# Patient Record
Sex: FEMALE | Race: BLACK OR AFRICAN AMERICAN | NOT HISPANIC OR LATINO | URBAN - METROPOLITAN AREA
[De-identification: names, ages, dates, MRNs, and addresses within clinical notes are randomized per-mention and may not be internally consistent; named-entity substitution may affect disease eponyms.]

---

## 2017-03-02 ENCOUNTER — INPATIENT (INPATIENT)
Facility: HOSPITAL | Age: 27
LOS: 4 days | Discharge: HOME | End: 2017-03-07
Attending: NEUROLOGICAL SURGERY | Admitting: NEUROLOGICAL SURGERY
Payer: MEDICAID

## 2017-03-02 VITALS
OXYGEN SATURATION: 98 % | RESPIRATION RATE: 18 BRPM | DIASTOLIC BLOOD PRESSURE: 89 MMHG | SYSTOLIC BLOOD PRESSURE: 141 MMHG | HEART RATE: 117 BPM | TEMPERATURE: 98 F

## 2017-03-02 DIAGNOSIS — Z98.890 OTHER SPECIFIED POSTPROCEDURAL STATES: Chronic | ICD-10-CM

## 2017-03-02 DIAGNOSIS — R51 HEADACHE: ICD-10-CM

## 2017-03-02 DIAGNOSIS — T85.09XA OTHER MECHANICAL COMPLICATION OF VENTRICULAR INTRACRANIAL (COMMUNICATING) SHUNT, INITIAL ENCOUNTER: ICD-10-CM

## 2017-03-02 LAB
ALBUMIN SERPL ELPH-MCNC: 4.2 G/DL — SIGNIFICANT CHANGE UP (ref 3.3–5)
ALP SERPL-CCNC: 114 U/L — SIGNIFICANT CHANGE UP (ref 40–120)
ALT FLD-CCNC: 36 U/L — HIGH (ref 4–33)
APPEARANCE UR: SIGNIFICANT CHANGE UP
APPEARANCE UR: SIGNIFICANT CHANGE UP
APTT BLD: 31.3 SEC — SIGNIFICANT CHANGE UP (ref 27.5–37.4)
AST SERPL-CCNC: 18 U/L — SIGNIFICANT CHANGE UP (ref 4–32)
BACTERIA # UR AUTO: SIGNIFICANT CHANGE UP
BASOPHILS # BLD AUTO: 0.01 K/UL — SIGNIFICANT CHANGE UP (ref 0–0.2)
BASOPHILS NFR BLD AUTO: 0.1 % — SIGNIFICANT CHANGE UP (ref 0–2)
BILIRUB SERPL-MCNC: 0.3 MG/DL — SIGNIFICANT CHANGE UP (ref 0.2–1.2)
BILIRUB UR-MCNC: NEGATIVE — SIGNIFICANT CHANGE UP
BILIRUB UR-MCNC: NEGATIVE — SIGNIFICANT CHANGE UP
BLOOD UR QL VISUAL: HIGH
BLOOD UR QL VISUAL: NEGATIVE — SIGNIFICANT CHANGE UP
BUN SERPL-MCNC: 13 MG/DL — SIGNIFICANT CHANGE UP (ref 7–23)
CALCIUM SERPL-MCNC: 9.1 MG/DL — SIGNIFICANT CHANGE UP (ref 8.4–10.5)
CHLORIDE SERPL-SCNC: 103 MMOL/L — SIGNIFICANT CHANGE UP (ref 98–107)
CO2 SERPL-SCNC: 25 MMOL/L — SIGNIFICANT CHANGE UP (ref 22–31)
COLOR SPEC: YELLOW — SIGNIFICANT CHANGE UP
COLOR SPEC: YELLOW — SIGNIFICANT CHANGE UP
CREAT SERPL-MCNC: 0.81 MG/DL — SIGNIFICANT CHANGE UP (ref 0.5–1.3)
EOSINOPHIL # BLD AUTO: 0.05 K/UL — SIGNIFICANT CHANGE UP (ref 0–0.5)
EOSINOPHIL NFR BLD AUTO: 0.6 % — SIGNIFICANT CHANGE UP (ref 0–6)
GLUCOSE SERPL-MCNC: 104 MG/DL — HIGH (ref 70–99)
GLUCOSE UR-MCNC: NEGATIVE — SIGNIFICANT CHANGE UP
GLUCOSE UR-MCNC: NEGATIVE — SIGNIFICANT CHANGE UP
HCG SERPL-ACNC: < 5 MIU/ML — SIGNIFICANT CHANGE UP
HCG UR-SCNC: NEGATIVE — SIGNIFICANT CHANGE UP
HCT VFR BLD CALC: 38.7 % — SIGNIFICANT CHANGE UP (ref 34.5–45)
HGB BLD-MCNC: 12.3 G/DL — SIGNIFICANT CHANGE UP (ref 11.5–15.5)
IMM GRANULOCYTES NFR BLD AUTO: 0.3 % — SIGNIFICANT CHANGE UP (ref 0–1.5)
INR BLD: 0.99 — SIGNIFICANT CHANGE UP (ref 0.87–1.18)
KETONES UR-MCNC: NEGATIVE — SIGNIFICANT CHANGE UP
KETONES UR-MCNC: NEGATIVE — SIGNIFICANT CHANGE UP
LEUKOCYTE ESTERASE UR-ACNC: HIGH
LEUKOCYTE ESTERASE UR-ACNC: HIGH
LYMPHOCYTES # BLD AUTO: 2.72 K/UL — SIGNIFICANT CHANGE UP (ref 1–3.3)
LYMPHOCYTES # BLD AUTO: 30.9 % — SIGNIFICANT CHANGE UP (ref 13–44)
MCHC RBC-ENTMCNC: 23.6 PG — LOW (ref 27–34)
MCHC RBC-ENTMCNC: 31.8 % — LOW (ref 32–36)
MCV RBC AUTO: 74.1 FL — LOW (ref 80–100)
MONOCYTES # BLD AUTO: 0.52 K/UL — SIGNIFICANT CHANGE UP (ref 0–0.9)
MONOCYTES NFR BLD AUTO: 5.9 % — SIGNIFICANT CHANGE UP (ref 2–14)
MUCOUS THREADS # UR AUTO: SIGNIFICANT CHANGE UP
MUCOUS THREADS # UR AUTO: SIGNIFICANT CHANGE UP
NEUTROPHILS # BLD AUTO: 5.47 K/UL — SIGNIFICANT CHANGE UP (ref 1.8–7.4)
NEUTROPHILS NFR BLD AUTO: 62.2 % — SIGNIFICANT CHANGE UP (ref 43–77)
NITRITE UR-MCNC: NEGATIVE — SIGNIFICANT CHANGE UP
NITRITE UR-MCNC: NEGATIVE — SIGNIFICANT CHANGE UP
PH UR: 6.5 — SIGNIFICANT CHANGE UP (ref 4.6–8)
PH UR: 7 — SIGNIFICANT CHANGE UP (ref 4.6–8)
PLATELET # BLD AUTO: 312 K/UL — SIGNIFICANT CHANGE UP (ref 150–400)
PMV BLD: 9.9 FL — SIGNIFICANT CHANGE UP (ref 7–13)
POTASSIUM SERPL-MCNC: 3.8 MMOL/L — SIGNIFICANT CHANGE UP (ref 3.5–5.3)
POTASSIUM SERPL-SCNC: 3.8 MMOL/L — SIGNIFICANT CHANGE UP (ref 3.5–5.3)
PROT SERPL-MCNC: 7.6 G/DL — SIGNIFICANT CHANGE UP (ref 6–8.3)
PROT UR-MCNC: 100 — HIGH
PROT UR-MCNC: 20 — SIGNIFICANT CHANGE UP
PROTHROM AB SERPL-ACNC: 11.3 SEC — SIGNIFICANT CHANGE UP (ref 10–13.1)
RBC # BLD: 5.22 M/UL — HIGH (ref 3.8–5.2)
RBC # FLD: 13.8 % — SIGNIFICANT CHANGE UP (ref 10.3–14.5)
RBC CASTS # UR COMP ASSIST: HIGH (ref 0–?)
RBC CASTS # UR COMP ASSIST: SIGNIFICANT CHANGE UP (ref 0–?)
SODIUM SERPL-SCNC: 142 MMOL/L — SIGNIFICANT CHANGE UP (ref 135–145)
SP GR SPEC: 1.03 — SIGNIFICANT CHANGE UP (ref 1–1.03)
SP GR SPEC: 1.03 — SIGNIFICANT CHANGE UP (ref 1–1.03)
SP GR UR: 1.03 — SIGNIFICANT CHANGE UP (ref 1–1.03)
SQUAMOUS # UR AUTO: SIGNIFICANT CHANGE UP
SQUAMOUS # UR AUTO: SIGNIFICANT CHANGE UP
UROBILINOGEN FLD QL: NORMAL E.U. — SIGNIFICANT CHANGE UP (ref 0.1–0.2)
UROBILINOGEN FLD QL: NORMAL E.U. — SIGNIFICANT CHANGE UP (ref 0.1–0.2)
WBC # BLD: 8.8 K/UL — SIGNIFICANT CHANGE UP (ref 3.8–10.5)
WBC # FLD AUTO: 8.8 K/UL — SIGNIFICANT CHANGE UP (ref 3.8–10.5)
WBC UR QL: HIGH (ref 0–?)
WBC UR QL: SIGNIFICANT CHANGE UP (ref 0–?)

## 2017-03-02 PROCEDURE — 71010: CPT

## 2017-03-02 PROCEDURE — 74000: CPT

## 2017-03-02 PROCEDURE — 49427 INJECTION ABDOMINAL SHUNT: CPT

## 2017-03-02 PROCEDURE — 70360 X-RAY EXAM OF NECK: CPT

## 2017-03-02 PROCEDURE — 70450 CT HEAD/BRAIN W/O DYE: CPT | Mod: 26

## 2017-03-02 PROCEDURE — 75809 NONVASCULAR SHUNT X-RAY: CPT | Mod: 26

## 2017-03-02 RX ORDER — DOCUSATE SODIUM 100 MG
100 CAPSULE ORAL
Qty: 0 | Refills: 0 | Status: DISCONTINUED | OUTPATIENT
Start: 2017-03-02 | End: 2017-03-07

## 2017-03-02 RX ORDER — ACETAMINOPHEN 500 MG
650 TABLET ORAL EVERY 6 HOURS
Qty: 0 | Refills: 0 | Status: DISCONTINUED | OUTPATIENT
Start: 2017-03-02 | End: 2017-03-07

## 2017-03-02 RX ORDER — DEXTROSE MONOHYDRATE, SODIUM CHLORIDE, AND POTASSIUM CHLORIDE 50; .745; 4.5 G/1000ML; G/1000ML; G/1000ML
1000 INJECTION, SOLUTION INTRAVENOUS
Qty: 0 | Refills: 0 | Status: DISCONTINUED | OUTPATIENT
Start: 2017-03-02 | End: 2017-03-03

## 2017-03-02 RX ORDER — NORTRIPTYLINE HYDROCHLORIDE 10 MG/1
20 CAPSULE ORAL AT BEDTIME
Qty: 0 | Refills: 0 | Status: DISCONTINUED | OUTPATIENT
Start: 2017-03-02 | End: 2017-03-07

## 2017-03-02 RX ORDER — METOCLOPRAMIDE HCL 10 MG
10 TABLET ORAL ONCE
Qty: 0 | Refills: 0 | Status: COMPLETED | OUTPATIENT
Start: 2017-03-02 | End: 2017-03-02

## 2017-03-02 RX ORDER — SODIUM CHLORIDE 9 MG/ML
1000 INJECTION INTRAMUSCULAR; INTRAVENOUS; SUBCUTANEOUS ONCE
Qty: 0 | Refills: 0 | Status: COMPLETED | OUTPATIENT
Start: 2017-03-02 | End: 2017-03-02

## 2017-03-02 RX ORDER — FLUTICASONE PROPIONATE 50 MCG
2 SPRAY, SUSPENSION NASAL DAILY
Qty: 0 | Refills: 0 | Status: DISCONTINUED | OUTPATIENT
Start: 2017-03-02 | End: 2017-03-02

## 2017-03-02 RX ORDER — FLUTICASONE PROPIONATE 50 MCG
2 SPRAY, SUSPENSION NASAL DAILY
Qty: 0 | Refills: 0 | Status: DISCONTINUED | OUTPATIENT
Start: 2017-03-02 | End: 2017-03-07

## 2017-03-02 RX ORDER — TRIAMTERENE/HYDROCHLOROTHIAZID 75 MG-50MG
1 TABLET ORAL DAILY
Qty: 0 | Refills: 0 | Status: DISCONTINUED | OUTPATIENT
Start: 2017-03-02 | End: 2017-03-07

## 2017-03-02 RX ADMIN — Medication 10 MILLIGRAM(S): at 19:00

## 2017-03-02 RX ADMIN — SODIUM CHLORIDE 1000 MILLILITER(S): 9 INJECTION INTRAMUSCULAR; INTRAVENOUS; SUBCUTANEOUS at 19:00

## 2017-03-02 RX ADMIN — DEXTROSE MONOHYDRATE, SODIUM CHLORIDE, AND POTASSIUM CHLORIDE 75 MILLILITER(S): 50; .745; 4.5 INJECTION, SOLUTION INTRAVENOUS at 23:45

## 2017-03-02 NOTE — H&P ADULT. - ASSESSMENT
25 yo woman w/ h/o hydrocephalus (s/p  shunt, last revision 1 yr ago) p/w headache. Rule out shunt malfunction

## 2017-03-02 NOTE — ED PROVIDER NOTE - PMH
Meniere disease  Pt  discontinued   triamterene/HCTZ,  stopped March. Pt states MD aware  Obstructive hydrocephalus   shunt placed at birth with multiple revisions for malfunction. LAST REVISION 02/2015  Personal History of Hydrocephalus (ICD9 V12.49)    Strabismus

## 2017-03-02 NOTE — H&P ADULT. - PROBLEM SELECTOR PLAN 1
Rule out shunt malfunction  Ophthomology consult to evaluate for papilledema  NPO after midnight  Preop for possible revision vs placement of ICP monitor in AM

## 2017-03-02 NOTE — ED PROVIDER NOTE - OBJECTIVE STATEMENT
27 yo woman w/ h/o hydrocephalus (s/p  shunt, last revision 1 yr ago) p/w headache. States she always had headache, but has worsened over past few days associated w/ multiple episodes of vomiting, R facial numbness and increased urination. States these are symptoms which have classically been associated w/  shunt malfunction. Denies fever, sob, dysuria, weakness.

## 2017-03-02 NOTE — ED ADULT TRIAGE NOTE - CHIEF COMPLAINT QUOTE
p/t with  shunt c/o of headaches on and off nausea vomiting since yesterday p/t appears uncomfortable

## 2017-03-02 NOTE — ED PROVIDER NOTE - ATTENDING CONTRIBUTION TO CARE
26f, pmhx  shunt, last revision 1 year ago. p/w h/a, vomiting and right sided facial numbness, similar to previous shunt malfunction. per pt mother' even with malfunction CT always shows slit like ventricles even when malfunctioned. exam, vs wnl, nad. gcs 15, CN II-XII normal. motor/sensation normal all 4 limbs. ct head shows noh hydrocephalus. likely still malfunction. seen and admitted to nsx.

## 2017-03-02 NOTE — ED PROVIDER NOTE - PSH
Ventriculo-Peritoneal Shunt Status (ICD9 V45.2)   shunt placed at birth with multiple revisions 1990 2002 x2 2003 2004 2007 2009 2011 2015 .

## 2017-03-02 NOTE — H&P ADULT. - PSH
S/P craniotomy  cranial vault expansion  Ventriculo-Peritoneal Shunt Status (ICD9 V45.2)   shunt placed at birth with multiple revisions 1990 2002 x2 2003 2004 2007 2009 2011 2015 .

## 2017-03-02 NOTE — H&P ADULT. - PMH
Craniosynostosis    Meniere disease  Pt  discontinued   triamterene/HCTZ,  stopped March. Pt states MD aware  Obstructive hydrocephalus   shunt placed at birth with multiple revisions for malfunction. LAST REVISION 02/2015  Personal History of Hydrocephalus (ICD9 V12.49)    Strabismus

## 2017-03-02 NOTE — H&P ADULT. - HISTORY OF PRESENT ILLNESS
25 yo woman w/ h/o hydrocephalus (s/p  shunt, last revision 1 yr ago) p/w headache. States she always had headache, but has worsened over past few days associated w/ multiple episodes of vomiting, R facial numbness and increased urination. States these are symptoms which have classically been associated w/  shunt malfunction. Denies fever, sob, dysuria, weakness.

## 2017-03-02 NOTE — ED PROVIDER NOTE - MEDICAL DECISION MAKING DETAILS
25 yo woman w/ headache, vomiting. Concern for  shunt malfunction. Will check labs, ct, shunt series, nsgy consult.

## 2017-03-02 NOTE — H&P ADULT. - NEUROLOGICAL DETAILS
normal strength/sensation intact/alert and oriented x 3/cranial nerves intact/deep reflexes intact/responds to verbal commands

## 2017-03-02 NOTE — ED ADULT NURSE NOTE - OBJECTIVE STATEMENT
Rec'd 25 YO F in rm 19, c/o HA, N/V, R facial numbness and pain x2 days. Fever 4 days ago. Pt has hx of  shunt since infancy, multiple revisions. Reports short-term memory loss which started 2 years ago. Pt A&OX3, NAD, VSS as noted. Neg facial assymetry, + decreased sensation on R side. No extremity deficits. IV accessed and labs sent. MD angulo ongoing. Will cont to monitor. RF

## 2017-03-03 LAB
BLD GP AB SCN SERPL QL: NEGATIVE — SIGNIFICANT CHANGE UP
RH IG SCN BLD-IMP: POSITIVE — SIGNIFICANT CHANGE UP

## 2017-03-03 PROCEDURE — 70551 MRI BRAIN STEM W/O DYE: CPT | Mod: 26

## 2017-03-03 PROCEDURE — 76705 ECHO EXAM OF ABDOMEN: CPT | Mod: 26

## 2017-03-03 RX ORDER — ONDANSETRON 8 MG/1
8 TABLET, FILM COATED ORAL EVERY 8 HOURS
Qty: 0 | Refills: 0 | Status: DISCONTINUED | OUTPATIENT
Start: 2017-03-03 | End: 2017-03-07

## 2017-03-03 RX ADMIN — DEXTROSE MONOHYDRATE, SODIUM CHLORIDE, AND POTASSIUM CHLORIDE 75 MILLILITER(S): 50; .745; 4.5 INJECTION, SOLUTION INTRAVENOUS at 01:44

## 2017-03-03 RX ADMIN — DEXTROSE MONOHYDRATE, SODIUM CHLORIDE, AND POTASSIUM CHLORIDE 75 MILLILITER(S): 50; .745; 4.5 INJECTION, SOLUTION INTRAVENOUS at 06:50

## 2017-03-03 RX ADMIN — ONDANSETRON 8 MILLIGRAM(S): 8 TABLET, FILM COATED ORAL at 14:01

## 2017-03-03 RX ADMIN — NORTRIPTYLINE HYDROCHLORIDE 20 MILLIGRAM(S): 10 CAPSULE ORAL at 21:31

## 2017-03-03 RX ADMIN — Medication 100 MILLIGRAM(S): at 17:47

## 2017-03-03 RX ADMIN — Medication 100 MILLIGRAM(S): at 06:50

## 2017-03-04 RX ADMIN — ONDANSETRON 8 MILLIGRAM(S): 8 TABLET, FILM COATED ORAL at 12:00

## 2017-03-04 RX ADMIN — Medication 100 MILLIGRAM(S): at 17:02

## 2017-03-04 RX ADMIN — Medication 100 MILLIGRAM(S): at 06:06

## 2017-03-04 RX ADMIN — NORTRIPTYLINE HYDROCHLORIDE 20 MILLIGRAM(S): 10 CAPSULE ORAL at 21:10

## 2017-03-05 RX ORDER — OXYCODONE HYDROCHLORIDE 5 MG/1
10 TABLET ORAL EVERY 4 HOURS
Qty: 0 | Refills: 0 | Status: DISCONTINUED | OUTPATIENT
Start: 2017-03-05 | End: 2017-03-07

## 2017-03-05 RX ORDER — OXYCODONE HYDROCHLORIDE 5 MG/1
5 TABLET ORAL EVERY 4 HOURS
Qty: 0 | Refills: 0 | Status: DISCONTINUED | OUTPATIENT
Start: 2017-03-05 | End: 2017-03-07

## 2017-03-05 RX ORDER — PANTOPRAZOLE SODIUM 20 MG/1
40 TABLET, DELAYED RELEASE ORAL
Qty: 0 | Refills: 0 | Status: DISCONTINUED | OUTPATIENT
Start: 2017-03-05 | End: 2017-03-07

## 2017-03-05 RX ORDER — HEPARIN SODIUM 5000 [USP'U]/ML
5000 INJECTION INTRAVENOUS; SUBCUTANEOUS EVERY 8 HOURS
Qty: 0 | Refills: 0 | Status: DISCONTINUED | OUTPATIENT
Start: 2017-03-05 | End: 2017-03-06

## 2017-03-05 RX ORDER — OXYCODONE HYDROCHLORIDE 5 MG/1
10 TABLET ORAL ONCE
Qty: 0 | Refills: 0 | Status: DISCONTINUED | OUTPATIENT
Start: 2017-03-05 | End: 2017-03-05

## 2017-03-05 RX ADMIN — OXYCODONE HYDROCHLORIDE 10 MILLIGRAM(S): 5 TABLET ORAL at 20:15

## 2017-03-05 RX ADMIN — OXYCODONE HYDROCHLORIDE 10 MILLIGRAM(S): 5 TABLET ORAL at 19:17

## 2017-03-05 RX ADMIN — NORTRIPTYLINE HYDROCHLORIDE 20 MILLIGRAM(S): 10 CAPSULE ORAL at 22:06

## 2017-03-05 RX ADMIN — OXYCODONE HYDROCHLORIDE 10 MILLIGRAM(S): 5 TABLET ORAL at 22:46

## 2017-03-05 RX ADMIN — Medication 100 MILLIGRAM(S): at 17:03

## 2017-03-05 RX ADMIN — Medication 100 MILLIGRAM(S): at 05:39

## 2017-03-05 RX ADMIN — OXYCODONE HYDROCHLORIDE 10 MILLIGRAM(S): 5 TABLET ORAL at 09:40

## 2017-03-05 RX ADMIN — OXYCODONE HYDROCHLORIDE 10 MILLIGRAM(S): 5 TABLET ORAL at 06:25

## 2017-03-05 RX ADMIN — PANTOPRAZOLE SODIUM 40 MILLIGRAM(S): 20 TABLET, DELAYED RELEASE ORAL at 10:04

## 2017-03-05 RX ADMIN — OXYCODONE HYDROCHLORIDE 10 MILLIGRAM(S): 5 TABLET ORAL at 05:40

## 2017-03-05 RX ADMIN — OXYCODONE HYDROCHLORIDE 10 MILLIGRAM(S): 5 TABLET ORAL at 22:06

## 2017-03-05 RX ADMIN — OXYCODONE HYDROCHLORIDE 10 MILLIGRAM(S): 5 TABLET ORAL at 10:24

## 2017-03-06 PROCEDURE — 61070 BRAIN CANAL SHUNT PROCEDURE: CPT

## 2017-03-06 RX ORDER — POLYETHYLENE GLYCOL 3350 17 G/17G
17 POWDER, FOR SOLUTION ORAL DAILY
Qty: 0 | Refills: 0 | Status: DISCONTINUED | OUTPATIENT
Start: 2017-03-06 | End: 2017-03-07

## 2017-03-06 RX ORDER — SENNA PLUS 8.6 MG/1
2 TABLET ORAL AT BEDTIME
Qty: 0 | Refills: 0 | Status: DISCONTINUED | OUTPATIENT
Start: 2017-03-06 | End: 2017-03-07

## 2017-03-06 RX ADMIN — Medication 1 ENEMA: at 19:22

## 2017-03-06 RX ADMIN — OXYCODONE HYDROCHLORIDE 10 MILLIGRAM(S): 5 TABLET ORAL at 21:21

## 2017-03-06 RX ADMIN — POLYETHYLENE GLYCOL 3350 17 GRAM(S): 17 POWDER, FOR SOLUTION ORAL at 16:21

## 2017-03-06 RX ADMIN — OXYCODONE HYDROCHLORIDE 10 MILLIGRAM(S): 5 TABLET ORAL at 13:26

## 2017-03-06 RX ADMIN — ONDANSETRON 8 MILLIGRAM(S): 8 TABLET, FILM COATED ORAL at 12:17

## 2017-03-06 RX ADMIN — Medication 100 MILLIGRAM(S): at 06:37

## 2017-03-06 RX ADMIN — OXYCODONE HYDROCHLORIDE 10 MILLIGRAM(S): 5 TABLET ORAL at 07:03

## 2017-03-06 RX ADMIN — SENNA PLUS 2 TABLET(S): 8.6 TABLET ORAL at 21:21

## 2017-03-06 RX ADMIN — OXYCODONE HYDROCHLORIDE 10 MILLIGRAM(S): 5 TABLET ORAL at 06:25

## 2017-03-06 RX ADMIN — NORTRIPTYLINE HYDROCHLORIDE 20 MILLIGRAM(S): 10 CAPSULE ORAL at 21:21

## 2017-03-06 RX ADMIN — OXYCODONE HYDROCHLORIDE 10 MILLIGRAM(S): 5 TABLET ORAL at 12:36

## 2017-03-06 RX ADMIN — Medication 100 MILLIGRAM(S): at 19:22

## 2017-03-06 RX ADMIN — OXYCODONE HYDROCHLORIDE 10 MILLIGRAM(S): 5 TABLET ORAL at 22:20

## 2017-03-06 NOTE — BRIEF OPERATIVE NOTE - OPERATION/FINDINGS
Nuclear medicine shunt tap for checking open pressure and injection of radio-isotope for nuclear medicine study.    consent was obtained, Time out was performed.  patient was prepped and draped in the usual fashion for a shunt tap with betadine and hair clip.  no pre procedure abx indicated.  23 gauge butterfly access of valve understerile condition.  manometer checked opening pressure of 8cm water.  manometer removed and injection of 0.5 cc of radio-isotope.  nuclear medicine will evaluated the shunt isotope as it enters the abdomen.  patient tolerated shunt tap without complication.  all material was accounted for and isotop disposed of properly by nuclear medicine.

## 2017-03-07 ENCOUNTER — TRANSCRIPTION ENCOUNTER (OUTPATIENT)
Age: 27
End: 2017-03-07

## 2017-03-07 VITALS
OXYGEN SATURATION: 100 % | TEMPERATURE: 98 F | RESPIRATION RATE: 16 BRPM | DIASTOLIC BLOOD PRESSURE: 69 MMHG | SYSTOLIC BLOOD PRESSURE: 118 MMHG | HEART RATE: 86 BPM

## 2017-03-07 RX ORDER — SENNA PLUS 8.6 MG/1
2 TABLET ORAL
Qty: 0 | Refills: 0 | COMMUNITY
Start: 2017-03-07

## 2017-03-07 RX ORDER — MAGNESIUM HYDROXIDE 400 MG/1
30 TABLET, CHEWABLE ORAL DAILY
Qty: 0 | Refills: 0 | Status: DISCONTINUED | OUTPATIENT
Start: 2017-03-07 | End: 2017-03-07

## 2017-03-07 RX ADMIN — PANTOPRAZOLE SODIUM 40 MILLIGRAM(S): 20 TABLET, DELAYED RELEASE ORAL at 05:59

## 2017-03-07 RX ADMIN — OXYCODONE HYDROCHLORIDE 10 MILLIGRAM(S): 5 TABLET ORAL at 09:16

## 2017-03-07 RX ADMIN — Medication 100 MILLIGRAM(S): at 05:59

## 2017-03-07 RX ADMIN — OXYCODONE HYDROCHLORIDE 10 MILLIGRAM(S): 5 TABLET ORAL at 04:30

## 2017-03-07 RX ADMIN — POLYETHYLENE GLYCOL 3350 17 GRAM(S): 17 POWDER, FOR SOLUTION ORAL at 10:24

## 2017-03-07 RX ADMIN — OXYCODONE HYDROCHLORIDE 10 MILLIGRAM(S): 5 TABLET ORAL at 03:31

## 2017-03-07 RX ADMIN — OXYCODONE HYDROCHLORIDE 10 MILLIGRAM(S): 5 TABLET ORAL at 08:35

## 2017-03-07 NOTE — DISCHARGE NOTE ADULT - PATIENT PORTAL LINK FT
“You can access the Follow[facility] Patient Portal, offered by [facility], by registering with the following website: [email or web address]”

## 2017-03-07 NOTE — DISCHARGE NOTE ADULT - CARE PROVIDER_API CALL
Manolo Santana), [facility]  [address] [place] [address]  Phone: [phone]  Fax: [phone]

## 2017-03-07 NOTE — DISCHARGE NOTE ADULT - HOSPITAL COURSE
This is a 26 yr old female with hx of VPS and persistent headaches. Pt was admitted to r/o shunt malfunction. Pt had multiple examinations - Nuclear medicine study, Abdominal US, Shunt series, MRI brain and CT head - all were negative. Pt was seen by Ophtho - negative for papielledema. No shunt malfunction, cleared for discharge home.

## 2017-03-07 NOTE — DISCHARGE NOTE ADULT - MEDICATION SUMMARY - MEDICATIONS TO STOP TAKING
I will STOP taking the medications listed below when I get home from the hospital:    Percocet 5/325 oral tablet  -- 2 tab(s) by mouth every 6 hours, As Needed

## 2017-03-07 NOTE — DISCHARGE NOTE ADULT - MEDICATION SUMMARY - MEDICATIONS TO TAKE
I will START or STAY ON the medications listed below when I get home from the hospital:    acetaminophen 325 mg oral tablet  -- 2 tab(s) by mouth every 6 hours, As needed, For Temp over 37.9 C (100.2 F)  -- Indication: For fever    acetaminophen 325 mg oral tablet  -- 2 tab(s) by mouth every 6 hours, As needed, Mild Pain  -- Indication: For pain    nortriptyline 10 mg oral capsule  -- 2 cap(s) by mouth once a day (at bedtime)  -- Indication: For Home med    ondansetron 4 mg oral tablet, disintegrating  -- 1 tab(s) by mouth every 6 hours, As needed, Nausea and/or Vomiting  -- Indication: For Home med    hydrochlorothiazide-triamterene 25 mg-37.5 mg oral tablet  -- 1 tab(s) by mouth once a day  -- Indication: For Home med    docusate sodium 100 mg oral capsule  -- 1 cap(s) by mouth 2 times a day  -- Indication: For Home med    senna oral tablet  -- 2 tab(s) by mouth once a day (at bedtime)  -- Indication: For Home med    fluticasone 50 mcg/inh nasal spray  -- 2 spray(s) into nose once a day  -- Indication: For Home med

## 2017-03-07 NOTE — DISCHARGE NOTE ADULT - CARE PLAN
Principal Discharge DX:	Headache  Goal:	f/u with neurologist  Instructions for follow-up, activity and diet:	F/u as outpatient

## 2017-03-15 ENCOUNTER — APPOINTMENT (OUTPATIENT)
Dept: PAIN MANAGEMENT | Facility: CLINIC | Age: 27
End: 2017-03-15

## 2017-05-01 ENCOUNTER — APPOINTMENT (OUTPATIENT)
Dept: GASTROENTEROLOGY | Facility: CLINIC | Age: 27
End: 2017-05-01

## 2017-05-01 VITALS
RESPIRATION RATE: 14 BRPM | TEMPERATURE: 98 F | OXYGEN SATURATION: 99 % | HEIGHT: 64 IN | WEIGHT: 180 LBS | SYSTOLIC BLOOD PRESSURE: 110 MMHG | HEART RATE: 110 BPM | BODY MASS INDEX: 30.73 KG/M2 | DIASTOLIC BLOOD PRESSURE: 70 MMHG

## 2017-05-01 DIAGNOSIS — Z82.49 FAMILY HISTORY OF ISCHEMIC HEART DISEASE AND OTHER DISEASES OF THE CIRCULATORY SYSTEM: ICD-10-CM

## 2017-05-01 RX ORDER — IBUPROFEN 200 MG/1
200 TABLET, COATED ORAL
Refills: 0 | Status: COMPLETED | COMMUNITY

## 2017-05-31 ENCOUNTER — RESULT REVIEW (OUTPATIENT)
Age: 27
End: 2017-05-31

## 2017-05-31 ENCOUNTER — APPOINTMENT (OUTPATIENT)
Dept: GASTROENTEROLOGY | Facility: HOSPITAL | Age: 27
End: 2017-05-31

## 2017-05-31 ENCOUNTER — OUTPATIENT (OUTPATIENT)
Dept: OUTPATIENT SERVICES | Facility: HOSPITAL | Age: 27
LOS: 1 days | Discharge: HOME | End: 2017-05-31
Payer: MEDICAID

## 2017-05-31 DIAGNOSIS — K59.00 CONSTIPATION, UNSPECIFIED: ICD-10-CM

## 2017-05-31 DIAGNOSIS — Z98.890 OTHER SPECIFIED POSTPROCEDURAL STATES: Chronic | ICD-10-CM

## 2017-05-31 LAB — HCG UR QL: NEGATIVE — SIGNIFICANT CHANGE UP

## 2017-05-31 PROCEDURE — 88305 TISSUE EXAM BY PATHOLOGIST: CPT | Mod: 26

## 2017-05-31 PROCEDURE — 88312 SPECIAL STAINS GROUP 1: CPT | Mod: 26

## 2017-05-31 PROCEDURE — 43239 EGD BIOPSY SINGLE/MULTIPLE: CPT | Mod: GC

## 2017-06-01 LAB — SURGICAL PATHOLOGY STUDY: SIGNIFICANT CHANGE UP

## 2017-07-13 ENCOUNTER — APPOINTMENT (OUTPATIENT)
Dept: GASTROENTEROLOGY | Facility: CLINIC | Age: 27
End: 2017-07-13

## 2017-07-13 VITALS
HEIGHT: 64 IN | SYSTOLIC BLOOD PRESSURE: 100 MMHG | TEMPERATURE: 98.4 F | DIASTOLIC BLOOD PRESSURE: 80 MMHG | OXYGEN SATURATION: 96 % | HEART RATE: 107 BPM | RESPIRATION RATE: 16 BRPM | BODY MASS INDEX: 31.41 KG/M2 | WEIGHT: 184 LBS

## 2017-07-13 DIAGNOSIS — K21.0 GASTRO-ESOPHAGEAL REFLUX DISEASE WITH ESOPHAGITIS: ICD-10-CM

## 2017-07-13 DIAGNOSIS — E66.9 OBESITY, UNSPECIFIED: ICD-10-CM

## 2017-07-13 DIAGNOSIS — K59.00 CONSTIPATION, UNSPECIFIED: ICD-10-CM

## 2017-07-13 DIAGNOSIS — R74.8 ABNORMAL LEVELS OF OTHER SERUM ENZYMES: ICD-10-CM

## 2017-07-14 LAB
CALCIUM SERPL-MCNC: 10 MG/DL
MAGNESIUM SERPL-MCNC: 2.3 MG/DL

## 2017-07-18 LAB
GGT SERPL-CCNC: 79 U/L
TTG IGA SER IA-ACNC: <5 UNITS
TTG IGA SER-ACNC: NEGATIVE
TTG IGG SER IA-ACNC: <5 UNITS
TTG IGG SER IA-ACNC: NEGATIVE

## 2017-07-28 ENCOUNTER — OUTPATIENT (OUTPATIENT)
Dept: OUTPATIENT SERVICES | Facility: HOSPITAL | Age: 27
LOS: 1 days | End: 2017-07-28

## 2017-07-28 ENCOUNTER — APPOINTMENT (OUTPATIENT)
Dept: MRI IMAGING | Facility: HOSPITAL | Age: 27
End: 2017-07-28
Payer: MEDICAID

## 2017-07-28 DIAGNOSIS — G54.4 LUMBOSACRAL ROOT DISORDERS, NOT ELSEWHERE CLASSIFIED: ICD-10-CM

## 2017-07-28 DIAGNOSIS — Z98.890 OTHER SPECIFIED POSTPROCEDURAL STATES: Chronic | ICD-10-CM

## 2017-07-28 PROCEDURE — 72148 MRI LUMBAR SPINE W/O DYE: CPT | Mod: 26

## 2017-08-16 ENCOUNTER — EMERGENCY (EMERGENCY)
Facility: HOSPITAL | Age: 27
LOS: 1 days | Discharge: HOME | End: 2017-08-16
Attending: EMERGENCY MEDICINE | Admitting: OTOLARYNGOLOGY
Payer: MEDICAID

## 2017-08-16 VITALS
SYSTOLIC BLOOD PRESSURE: 145 MMHG | DIASTOLIC BLOOD PRESSURE: 91 MMHG | TEMPERATURE: 99 F | HEART RATE: 110 BPM | OXYGEN SATURATION: 100 % | RESPIRATION RATE: 20 BRPM

## 2017-08-16 VITALS
WEIGHT: 184.09 LBS | TEMPERATURE: 98 F | OXYGEN SATURATION: 97 % | SYSTOLIC BLOOD PRESSURE: 118 MMHG | RESPIRATION RATE: 16 BRPM | DIASTOLIC BLOOD PRESSURE: 75 MMHG | HEIGHT: 64 IN | HEART RATE: 89 BPM

## 2017-08-16 DIAGNOSIS — Z98.890 OTHER SPECIFIED POSTPROCEDURAL STATES: Chronic | ICD-10-CM

## 2017-08-16 DIAGNOSIS — Z98.2 PRESENCE OF CEREBROSPINAL FLUID DRAINAGE DEVICE: ICD-10-CM

## 2017-08-16 LAB
ALBUMIN SERPL ELPH-MCNC: 4.2 G/DL — SIGNIFICANT CHANGE UP (ref 3.3–5)
ALP SERPL-CCNC: 106 U/L — SIGNIFICANT CHANGE UP (ref 40–120)
ALT FLD-CCNC: 25 U/L — SIGNIFICANT CHANGE UP (ref 4–33)
AST SERPL-CCNC: 13 U/L — SIGNIFICANT CHANGE UP (ref 4–32)
BASOPHILS # BLD AUTO: 0.02 K/UL — SIGNIFICANT CHANGE UP (ref 0–0.2)
BASOPHILS NFR BLD AUTO: 0.2 % — SIGNIFICANT CHANGE UP (ref 0–2)
BILIRUB SERPL-MCNC: 0.2 MG/DL — SIGNIFICANT CHANGE UP (ref 0.2–1.2)
BUN SERPL-MCNC: 10 MG/DL — SIGNIFICANT CHANGE UP (ref 7–23)
CALCIUM SERPL-MCNC: 9.3 MG/DL — SIGNIFICANT CHANGE UP (ref 8.4–10.5)
CHLORIDE SERPL-SCNC: 105 MMOL/L — SIGNIFICANT CHANGE UP (ref 98–107)
CO2 SERPL-SCNC: 22 MMOL/L — SIGNIFICANT CHANGE UP (ref 22–31)
CREAT SERPL-MCNC: 1.13 MG/DL — SIGNIFICANT CHANGE UP (ref 0.5–1.3)
EOSINOPHIL # BLD AUTO: 0.01 K/UL — SIGNIFICANT CHANGE UP (ref 0–0.5)
EOSINOPHIL NFR BLD AUTO: 0.1 % — SIGNIFICANT CHANGE UP (ref 0–6)
GLUCOSE SERPL-MCNC: 93 MG/DL — SIGNIFICANT CHANGE UP (ref 70–99)
HBA1C BLD-MCNC: 5.6 % — SIGNIFICANT CHANGE UP (ref 4–5.6)
HCG SERPL-ACNC: < 5 MIU/ML — SIGNIFICANT CHANGE UP
HCT VFR BLD CALC: 39.5 % — SIGNIFICANT CHANGE UP (ref 34.5–45)
HGB BLD-MCNC: 12.2 G/DL — SIGNIFICANT CHANGE UP (ref 11.5–15.5)
IMM GRANULOCYTES # BLD AUTO: 0.03 # — SIGNIFICANT CHANGE UP
IMM GRANULOCYTES NFR BLD AUTO: 0.3 % — SIGNIFICANT CHANGE UP (ref 0–1.5)
LIDOCAIN IGE QN: 31 U/L — SIGNIFICANT CHANGE UP (ref 7–60)
LYMPHOCYTES # BLD AUTO: 3.35 K/UL — HIGH (ref 1–3.3)
LYMPHOCYTES # BLD AUTO: 32.6 % — SIGNIFICANT CHANGE UP (ref 13–44)
MCHC RBC-ENTMCNC: 22.7 PG — LOW (ref 27–34)
MCHC RBC-ENTMCNC: 30.9 % — LOW (ref 32–36)
MCV RBC AUTO: 73.4 FL — LOW (ref 80–100)
MONOCYTES # BLD AUTO: 1.01 K/UL — HIGH (ref 0–0.9)
MONOCYTES NFR BLD AUTO: 9.8 % — SIGNIFICANT CHANGE UP (ref 2–14)
NEUTROPHILS # BLD AUTO: 5.86 K/UL — SIGNIFICANT CHANGE UP (ref 1.8–7.4)
NEUTROPHILS NFR BLD AUTO: 57 % — SIGNIFICANT CHANGE UP (ref 43–77)
NRBC # FLD: 0 — SIGNIFICANT CHANGE UP
PLATELET # BLD AUTO: 327 K/UL — SIGNIFICANT CHANGE UP (ref 150–400)
PMV BLD: 9.8 FL — SIGNIFICANT CHANGE UP (ref 7–13)
POTASSIUM SERPL-MCNC: 4.1 MMOL/L — SIGNIFICANT CHANGE UP (ref 3.5–5.3)
POTASSIUM SERPL-SCNC: 4.1 MMOL/L — SIGNIFICANT CHANGE UP (ref 3.5–5.3)
PROT SERPL-MCNC: 7.9 G/DL — SIGNIFICANT CHANGE UP (ref 6–8.3)
RBC # BLD: 5.38 M/UL — HIGH (ref 3.8–5.2)
RBC # FLD: 14.3 % — SIGNIFICANT CHANGE UP (ref 10.3–14.5)
SODIUM SERPL-SCNC: 140 MMOL/L — SIGNIFICANT CHANGE UP (ref 135–145)
WBC # BLD: 10.28 K/UL — SIGNIFICANT CHANGE UP (ref 3.8–10.5)
WBC # FLD AUTO: 10.28 K/UL — SIGNIFICANT CHANGE UP (ref 3.8–10.5)

## 2017-08-16 PROCEDURE — 99218: CPT

## 2017-08-16 PROCEDURE — 70551 MRI BRAIN STEM W/O DYE: CPT | Mod: 26

## 2017-08-16 PROCEDURE — 74000: CPT | Mod: 26

## 2017-08-16 PROCEDURE — 70250 X-RAY EXAM OF SKULL: CPT | Mod: 26

## 2017-08-16 PROCEDURE — 76705 ECHO EXAM OF ABDOMEN: CPT | Mod: 26

## 2017-08-16 PROCEDURE — 71010: CPT | Mod: 26

## 2017-08-16 RX ORDER — ONDANSETRON 8 MG/1
4 TABLET, FILM COATED ORAL ONCE
Qty: 0 | Refills: 0 | Status: COMPLETED | OUTPATIENT
Start: 2017-08-16 | End: 2017-08-16

## 2017-08-16 RX ORDER — NORTRIPTYLINE HYDROCHLORIDE 10 MG/1
2 CAPSULE ORAL
Qty: 0 | Refills: 0 | COMMUNITY

## 2017-08-16 RX ORDER — SODIUM CHLORIDE 9 MG/ML
1000 INJECTION INTRAMUSCULAR; INTRAVENOUS; SUBCUTANEOUS ONCE
Qty: 0 | Refills: 0 | Status: COMPLETED | OUTPATIENT
Start: 2017-08-16 | End: 2017-08-16

## 2017-08-16 RX ADMIN — SODIUM CHLORIDE 2000 MILLILITER(S): 9 INJECTION INTRAMUSCULAR; INTRAVENOUS; SUBCUTANEOUS at 18:09

## 2017-08-16 RX ADMIN — ONDANSETRON 4 MILLIGRAM(S): 8 TABLET, FILM COATED ORAL at 18:09

## 2017-08-16 NOTE — CONSULT NOTE ADULT - SUBJECTIVE AND OBJECTIVE BOX
27 YO female with history of  shunt, multiple revisions, chronic headaches presents to ED with 5 days of worsening right sided paresthesias and right lower quadrant abdominal pain, mild nausea. No change in baseline headaches, no visual disturbances, no new focal weakness.     WDWN female in NAD  Vital Signs Last 24 Hrs  T(C): 36.7 (16 Aug 2017 18:13), Max: 37.4 (16 Aug 2017 17:08)  T(F): 98.1 (16 Aug 2017 18:13), Max: 99.4 (16 Aug 2017 17:08)  HR: 112 (16 Aug 2017 18:13) (110 - 112)  BP: 148/82 (16 Aug 2017 18:13) (145/91 - 148/82)  BP(mean): --  RR: 16 (16 Aug 2017 18:13) (16 - 20)  SpO2: 100% (16 Aug 2017 18:13) (100% - 100%)    HEENT: Shunt pumps and fills well.   Neuro: AAO X 3  PERRLA, EOMI with +horizontal nystagmus  CN 2-12 grossly intact  MCKENNA. RUE 4+/5, LUE, Bilateral ROBERT 5/5, right UE and LE tremors  Mild decrease in sensation right face, arm, torso, and leg  No clonus 25 YO female with history of  shunt, multiple revisions, chronic headaches presents to ED with 5 days of worsening right sided paresthesias and right lower quadrant abdominal pain, mild nausea. No change in baseline headaches, no visual disturbances, no new focal weakness.     WDWN female in NAD  Vital Signs Last 24 Hrs  T(C): 36.7 (16 Aug 2017 18:13), Max: 37.4 (16 Aug 2017 17:08)  T(F): 98.1 (16 Aug 2017 18:13), Max: 99.4 (16 Aug 2017 17:08)  HR: 112 (16 Aug 2017 18:13) (110 - 112)  BP: 148/82 (16 Aug 2017 18:13) (145/91 - 148/82)  BP(mean): --  RR: 16 (16 Aug 2017 18:13) (16 - 20)  SpO2: 100% (16 Aug 2017 18:13) (100% - 100%)    HEENT: Shunt pumps and fills well.   Neuro: AAO X 3  PERRLA, EOMI with +horizontal nystagmus  CN 2-12 grossly intact  MCKENNA. RUE 4+/5, LUE, Bilateral ROBERT 5/5, right UE and LE tremors  Mild decrease in sensation right face, arm, torso, and leg  No clonus    Shunt series: No kinking or discontinuity in shunt catheter  Abdominal ultrasound: Contracted gallbladder without evidence of acute cholecystitis.  Hepatic steatosis.  Brain MRI: Stable ventricular size and configurarion

## 2017-08-16 NOTE — ED PROVIDER NOTE - MEDICAL DECISION MAKING DETAILS
26yof p/w right sided numbness, concern for  shunt malfunction. Neurosurgery consulted. CT head and shunt series, basic labs, zofran and fluids for nausea. Benign abdomen on exam, no indication for further imaging at this time. Will to defer to neurosurg recommendations regarding abdominal pain as it may be related to shunt outlet. Noted tachycardia on exam, appears to be baseline in the setting of pain, consistent w/ prior documentation. Will continue to monitor and reassess.

## 2017-08-16 NOTE — ED CDU PROVIDER NOTE - CONSTITUTIONAL, MLM
normal... Well appearing, well nourished, awake, alert, oriented to person, place, time/situation and in no apparent distress.  Pt. objectively appears comfortable; pt is verbalizing in full, clear, effortless sentences.

## 2017-08-16 NOTE — ED PROVIDER NOTE - CARE PLAN
Principal Discharge DX:	Headache  Secondary Diagnosis:	Numbness and tingling of right upper and lower extremity

## 2017-08-16 NOTE — ED PROVIDER NOTE - ATTENDING CONTRIBUTION TO CARE
I performed a face to face evaluation of this patient and performed a full history and physical examination on the patient.  I agree with the PA history, physical examination, and plan of the patient.  25yo F reports LLE DVT diagnosed in Dec 2016 at [facility], (+) FH of DVT/blood clots, follows with heme/onc clinic at [facility] reports compliance with coumadin though low INR, supposed to take Lovenox daily but has not taken in 1mo, seen at clinic today for LLE pain along anterior thigh and referred to ED, chose to come to [facility] for "second opinion", denies fall injury  On exam awake & alert, NAD., lungs CTAB, RRR, no edema, no calf tenderness, 2+ pulses b/l, neuro A&Ox3, no focal deficits, skin warm and dry no rash I performed a face to face evaluation of this patient and performed a full history and physical examination on the patient.  I agree with the resident's history, physical examination, and plan of the patient.  27yo F PMH: hydrocephalus s/p  shunt p/w 5 days of progressive R sided numbness, weakness and diffuse headache. Symptoms similar to prior episodes of  shunt malfunction, however, with several prior visits where shunt malfunction not found.  On exam awake & alert, NAD., NC/AT, mild tenderness over R temporal, PERRL, mmm, lungs CTAB, RRR, abdomen soft NT, 2+ pulses b/l, neuro A&Ox3, no focal deficits, skin warm and dry no rash

## 2017-08-16 NOTE — ED CDU PROVIDER NOTE - PLAN OF CARE
Follow up with your Neurologist and Neurosurgeon within 1 - 2 days of ER discharge.  Bring your discharge papers / test results with you to your follow up appointment(s).  The MRI radiology report is pending; you may call the Emergency Department Administrative PA (direct # [phone]; call between hours of 9am and 2pm) to follow up on the official report; a copy of the report to be mailed to you can be requested.  Rest / no strenuous activity.  Stay well hydrated.  ***Return to the Emergency Department if you experience any new / worsening symptoms or have any problems / concerns.***

## 2017-08-16 NOTE — ED CDU PROVIDER NOTE - PROGRESS NOTE DETAILS
CDU JUSTIN Webb Addendum----  Pt. resting comfortably in interim; no issues to date.  Neurosurgery PA reprogrammed  shunt on pt's return from MRI; reviewed all images of shuntogram / MRI and US abdomen results; states pt may be d/c'd home.  I spoke with ED attending Dr. Mcqueen and updated him regarding the above; pt will be d/c'd home per below instructions. CDU Att DC Note: Richar      I have personally performed a face to face evaluation of this patient including review of the history and focused physical exam.  I have discussed the case and reviewed the plan of care with the PA.  Patient improved during ED observation, imaging studies obtained as requested by neurosurgery, shunt reprogrammed and cleared for d/c home

## 2017-08-16 NOTE — CONSULT NOTE ADULT - PROBLEM SELECTOR RECOMMENDATION 9
One shot brain MRI without contrast  Shunt series  Abdominal ultrasound One shot brain MRI without contrast-completed  Shunt series-completed  Abdominal ultrasound-completed  Above discussed with Dr Nesbitt, discharge home, follow up as outpatient

## 2017-08-16 NOTE — ED PROVIDER NOTE - OBJECTIVE STATEMENT
26yof w/  shunt for hydrocephalus p/w 5 days of progressive 5 sided numbness, weakness and diffuse headache. Symptoms consistent w/ prior episodes of  shunt malfunction. C/o numbness from the neck down the entire right side of the body. No changes in vision. No bowel or bladder incontinence. Also endorses associated nausea and mild dull, aching upper abdominal pain, onset after neuro symptoms. No vomiting or diarrhea. No fevers, chills. 26yof w/  shunt for hydrocephalus p/w 5 days of progressive R sided numbness, weakness and diffuse headache. Symptoms consistent w/ prior episodes of  shunt malfunction. C/o numbness from the neck down the entire right side of the body. No changes in vision. No bowel or bladder incontinence. Also endorses associated nausea and mild dull, aching upper abdominal pain, onset after neuro symptoms. No vomiting or diarrhea. No fevers, chills.

## 2017-08-16 NOTE — ED CDU PROVIDER NOTE - OBJECTIVE STATEMENT
26yof w/  shunt for hydrocephalus p/w 5 days of progressive R sided numbness, weakness and diffuse headache. Symptoms consistent w/ prior episodes of  shunt malfunction. C/o numbness from the neck down the entire right side of the body. No changes in vision. No bowel or bladder incontinence. Also endorses associated nausea and mild dull, aching upper abdominal pain, onset after neuro symptoms. No vomiting or diarrhea. No fevers, chills.    CDU JUSTIN Webb Note-----  ED Provider Note reviewed per above; pt is a 25 yo female with PMH of hydrocephalus, with  shunt and hx/o multiple shunt revisions, who presented to the ED as noted above.  Pt. was evaluated in the ED and neurosurgery was consulted; pt was sent to CDU for MRI / US / shuntogram per orders, Neurosurgery reassess, observation and reassessment.  On CDU arrival, pt states she has been having chronic headaches; states she was on ibuprofen 800 mg, Advil PM, and nortriptyline for symptoms; states she was told to d/c ibuprofen and Advil PM approx. 3 weeks ago and d/c'd nortriptyline approx. one week ago.  Pt. states last year she experienced right sided body "loss of function" from  shunt failure; states current symptoms feel similar to early onset of symptoms last year.  Pt states she gets chronic headaches; states "I just deal with them".  No other c/o; no hx/o recent illness.  CDU plan discussed with pt who verbalizes agreement with plan.

## 2017-08-16 NOTE — ED PROVIDER NOTE - PROGRESS NOTE DETAILS
Favio: Evaluated by Neurosurg. Would like to get shunt series, MRI brain, and abdominal ultrasound to evaluate shunt function. PA Engelmann spoke with MRI and will try to schedule for tonight. Will dispo pt to CDU to await imaging, can admit to neurosurg from there if necessary.

## 2017-08-16 NOTE — ED CDU PROVIDER NOTE - ATTENDING CONTRIBUTION TO CARE
CDU Att PN: Richar  I performed a face to face bedside interview with patient regarding history of present illness, review of symptoms and past medical history. I completed an independent physical exam.  I have discussed patient's plan of care with PA.   I agree with note as stated above, having amended the EMR as needed to reflect my findings. I have discussed the assessment and plan of care.  This includes during the time I functioned as the attending physician for this patient.  27yo F PMH: hydrocephalus s/p  shunt p/w 5 days of progressive R sided numbness, weakness and diffuse headache. Symptoms similar to prior episodes of  shunt malfunction, however, with several prior visits where shunt malfunction not found.  On exam awake & alert, NAD., NC/AT, mild tenderness over R temporal, PERRL, mmm, lungs CTAB, RRR, abdomen soft NT, 2+ pulses b/l, neuro A&Ox3, no focal deficits, skin warm and dry no rash.

## 2017-08-16 NOTE — ED CDU PROVIDER NOTE - MUSCULOSKELETAL, MLM
Range of motion is not limited. Range of motion is not limited.  Right UE/ Right LE strength is 4/5 (proximally and distally) as compared to LUE/LLE which has 5/5 proximal/distal strength.  Strength testing evokes a transient generalized tremor in the areas being stressed; tremor is symmetrically present and self-terminates once strength testing is terminated (Pt states this is chronic baseline usual for her.)  Gait stable.

## 2017-08-16 NOTE — ED ADULT TRIAGE NOTE - CHIEF COMPLAINT QUOTE
Pt co pain to head  with numbness and tingling x 5 days. pt states has  shunt.  Pt co decreased strength to right side that radiates into chest  with nausea and abdominal pain since last night.

## 2017-08-16 NOTE — ED ADULT NURSE NOTE - OBJECTIVE STATEMENT
Pt a&ox3 c/o headache, numbness/tingling sensation down rt side of body including RUE and RLE. Pt breathing even and unlabored. Pt denies cp/discomfort, denies headache/dizziness. Abdomen is soft, non-tender, non-distended. Skin is cool dry and intact. IVL placed, labs sent. Will continue to monitor.

## 2017-08-26 ENCOUNTER — EMERGENCY (EMERGENCY)
Facility: HOSPITAL | Age: 27
LOS: 1 days | Discharge: LEFT BEFORE DISCHARGE | End: 2017-08-26
Attending: EMERGENCY MEDICINE | Admitting: EMERGENCY MEDICINE
Payer: MEDICAID

## 2017-08-26 VITALS
RESPIRATION RATE: 20 BRPM | OXYGEN SATURATION: 100 % | HEART RATE: 88 BPM | SYSTOLIC BLOOD PRESSURE: 140 MMHG | TEMPERATURE: 99 F | DIASTOLIC BLOOD PRESSURE: 100 MMHG

## 2017-08-26 DIAGNOSIS — Z98.890 OTHER SPECIFIED POSTPROCEDURAL STATES: Chronic | ICD-10-CM

## 2017-08-26 DIAGNOSIS — R20.0 ANESTHESIA OF SKIN: ICD-10-CM

## 2017-08-26 LAB
ALBUMIN SERPL ELPH-MCNC: 4.5 G/DL — SIGNIFICANT CHANGE UP (ref 3.3–5)
ALP SERPL-CCNC: 102 U/L — SIGNIFICANT CHANGE UP (ref 40–120)
ALT FLD-CCNC: 18 U/L — SIGNIFICANT CHANGE UP (ref 4–33)
AST SERPL-CCNC: 17 U/L — SIGNIFICANT CHANGE UP (ref 4–32)
BASOPHILS # BLD AUTO: 0.01 K/UL — SIGNIFICANT CHANGE UP (ref 0–0.2)
BASOPHILS NFR BLD AUTO: 0.1 % — SIGNIFICANT CHANGE UP (ref 0–2)
BILIRUB SERPL-MCNC: 0.2 MG/DL — SIGNIFICANT CHANGE UP (ref 0.2–1.2)
BUN SERPL-MCNC: 12 MG/DL — SIGNIFICANT CHANGE UP (ref 7–23)
CALCIUM SERPL-MCNC: 9.3 MG/DL — SIGNIFICANT CHANGE UP (ref 8.4–10.5)
CHLORIDE SERPL-SCNC: 104 MMOL/L — SIGNIFICANT CHANGE UP (ref 98–107)
CO2 SERPL-SCNC: 21 MMOL/L — LOW (ref 22–31)
CREAT SERPL-MCNC: 0.88 MG/DL — SIGNIFICANT CHANGE UP (ref 0.5–1.3)
EOSINOPHIL # BLD AUTO: 0.04 K/UL — SIGNIFICANT CHANGE UP (ref 0–0.5)
EOSINOPHIL NFR BLD AUTO: 0.5 % — SIGNIFICANT CHANGE UP (ref 0–6)
GLUCOSE SERPL-MCNC: 86 MG/DL — SIGNIFICANT CHANGE UP (ref 70–99)
HCT VFR BLD CALC: 42 % — SIGNIFICANT CHANGE UP (ref 34.5–45)
HGB BLD-MCNC: 12.6 G/DL — SIGNIFICANT CHANGE UP (ref 11.5–15.5)
IMM GRANULOCYTES # BLD AUTO: 0.02 # — SIGNIFICANT CHANGE UP
IMM GRANULOCYTES NFR BLD AUTO: 0.2 % — SIGNIFICANT CHANGE UP (ref 0–1.5)
LYMPHOCYTES # BLD AUTO: 2.83 K/UL — SIGNIFICANT CHANGE UP (ref 1–3.3)
LYMPHOCYTES # BLD AUTO: 34.9 % — SIGNIFICANT CHANGE UP (ref 13–44)
MCHC RBC-ENTMCNC: 22.1 PG — LOW (ref 27–34)
MCHC RBC-ENTMCNC: 30 % — LOW (ref 32–36)
MCV RBC AUTO: 73.6 FL — LOW (ref 80–100)
MONOCYTES # BLD AUTO: 0.49 K/UL — SIGNIFICANT CHANGE UP (ref 0–0.9)
MONOCYTES NFR BLD AUTO: 6 % — SIGNIFICANT CHANGE UP (ref 2–14)
NEUTROPHILS # BLD AUTO: 4.73 K/UL — SIGNIFICANT CHANGE UP (ref 1.8–7.4)
NEUTROPHILS NFR BLD AUTO: 58.3 % — SIGNIFICANT CHANGE UP (ref 43–77)
NRBC # FLD: 0 — SIGNIFICANT CHANGE UP
PLATELET # BLD AUTO: 344 K/UL — SIGNIFICANT CHANGE UP (ref 150–400)
PMV BLD: 10.4 FL — SIGNIFICANT CHANGE UP (ref 7–13)
POTASSIUM SERPL-MCNC: 3.9 MMOL/L — SIGNIFICANT CHANGE UP (ref 3.5–5.3)
POTASSIUM SERPL-SCNC: 3.9 MMOL/L — SIGNIFICANT CHANGE UP (ref 3.5–5.3)
PROT SERPL-MCNC: 8.1 G/DL — SIGNIFICANT CHANGE UP (ref 6–8.3)
RBC # BLD: 5.71 M/UL — HIGH (ref 3.8–5.2)
RBC # FLD: 14.6 % — HIGH (ref 10.3–14.5)
SODIUM SERPL-SCNC: 143 MMOL/L — SIGNIFICANT CHANGE UP (ref 135–145)
WBC # BLD: 8.12 K/UL — SIGNIFICANT CHANGE UP (ref 3.8–10.5)
WBC # FLD AUTO: 8.12 K/UL — SIGNIFICANT CHANGE UP (ref 3.8–10.5)

## 2017-08-26 PROCEDURE — 70450 CT HEAD/BRAIN W/O DYE: CPT | Mod: 26

## 2017-08-26 PROCEDURE — 99285 EMERGENCY DEPT VISIT HI MDM: CPT

## 2017-08-26 NOTE — ED ADULT NURSE NOTE - OBJECTIVE STATEMENT
received pt A&Ox3 in no apparent distress at this time. bloods drawn and sent to the lab. family and PA at bedside.  pt sent for ct scan. dispo pending

## 2017-08-26 NOTE — ED PROVIDER NOTE - OBJECTIVE STATEMENT
Pt is a 25 y/o F nonsmoker PMHx hydrocephalus, s/p  shunt, h/o  shunt revisions (last one was 2016), chronic headaches p/w numbness, tingling and weakness x 3 days.  Pt states she has had gradual onset bilateral upper and lower extremity numbness and tingling paresthesias associated with bilateral hand weakness.  Pt states she was seen in ED 10 days ago for right sided numbness, weakness, had a normal Xray  shuntogram and MRI; was seen by neurosurgery and cleared for discharge.  Pt endorses headache, which is the same headache she has had for several years.  Pt denies any fevers, chills, nausea, vomiting, visual/auditory disturbances, head trauma, neck pain, chest pain, abdominal pain, use of blood thinners, inability to walk. Pt is a 27 y/o F nonsmoker PMHx hydrocephalus, s/p  shunt, h/o  shunt revisions (last one was 2016), chronic headaches p/w numbness, tingling and weakness x 3 days.  Pt states she has had gradual onset bilateral upper and lower extremity numbness and tingling paresthesias associated with bilateral hand weakness.  Pt states she was seen in ED 10 days ago for right sided numbness, weakness, had a normal Xray  shuntogram and MRI; was seen by neurosurgery and cleared for discharge.  Pt endorses headache, which is the same headache she has had for several years.  Pt denies any fevers, chills, nausea, vomiting, visual/auditory disturbances, head trauma, neck pain, chest pain, abdominal pain, use of blood thinners, inability to walk.    Attending/Velma: 25 yo F as noted above, h/o hydrocephalus s/p  shunt revisions last one in 2016, at baseline chronic HA now with upper/lower ext weakness/paresthiasis. Denies fever/chills, nausea/vomiting, or vision changes.

## 2017-08-26 NOTE — ED PROVIDER NOTE - MEDICAL DECISION MAKING DETAILS
Pt is a 27 y/o F nonsmoker PMHx hydrocephalus, s/p  shunt, h/o  shunt revisions (last one was 2016), chronic headaches p/w numbness, tingling and weakness x 3 days -- r/o  shunt malfunction -- labs, neurosurgery consult

## 2017-08-26 NOTE — CONSULT NOTE ADULT - ASSESSMENT
Pt is a 26 year old female, nonsmoker with a PMHx hydrocephalus, s/p  shunt, h/o  shunt revisions (last one was 2016), chronic headaches p/w numbness, tingling and weakness x 3 days. She endorses having pins and needle like sensation all over her body. Full physical exam including assessment of strength of the extremities was not completed because the pt was walking out of the ED.  Ct head official report is still pending.     She endorses she has an appointment on Monday for MRI of brain. ED resident confirmed she also will be getting MRI of the spine. This is an appropriate workup study as an EMG was completed as an outpatient and was benign, according to the pt. I urged the pt to follow up with her outpatient neurologist within seven days and to return to the ED if there are any worsening of symptoms.    Risks of leaving the ED was discussed however pt and her mother were adamant to leave. Pt eloped.

## 2017-08-26 NOTE — ED PROVIDER NOTE - PHYSICAL EXAMINATION
4/5  strength bilaterally.  5/5 Lower extremity strength.  Sensation intact to light touch UE and LE. 4/5  strength bilaterally.  5/5 Lower extremity strength.  Sensation intact to light touch UE and LE.    Attending/Velma: UE distal 4/5; sensory intact; no papilledema

## 2017-08-26 NOTE — ED ADULT TRIAGE NOTE - CHIEF COMPLAINT QUOTE
hx of hydrochephalus with  shunt in place. patient c/o HA, numbness in the extremities x 1 week. denies visual changes. Patient was seen last friday at [facility] had MRI and was told to f/u with neurosurgeon however could not get appointment.

## 2017-08-26 NOTE — ED PROVIDER NOTE - CHPI ED SYMPTOMS NEG
no vomiting/no nausea/no dizziness/no confusion/no fever/no blurred vision/no change in level of consciousness/no loss of consciousness

## 2017-08-26 NOTE — CONSULT NOTE ADULT - SUBJECTIVE AND OBJECTIVE BOX
Neurology Consult Note    HPI - Pt is a 26 year old female, nonsmoker with a PMHx hydrocephalus, s/p  shunt, h/o  shunt revisions (last one was 2016), chronic headaches p/w numbness, tingling and weakness x 3 days.  Pt states she has had gradual onset bilateral upper and lower extremity numbness and tingling paresthesias associated with bilateral hand weakness.  Pt states she was seen in ED 10 days ago for right sided numbness, weakness, had a normal Xray  shuntogram and MRI; was seen by neurosurgery and cleared for discharge.     Today pt reports having pin and needle like sensations from her neck down, arm legs, abdominal area and back. She reports that it intermittently gets worse Pt endorses headache, which is the same headache she has had for several years. She reports having pain in her neck and lumbar pain as well. Recent Lumbar MRI     Pt denies any fevers, chills, nausea, vomiting, visual/auditory disturbances, head trauma, neck pain, chest pain, abdominal pain, use of blood thinners, inability to walk.    Vital Signs Last 24 Hrs  T(C): 37.3 (26 Aug 2017 16:52), Max: 37.3 (26 Aug 2017 16:52)  T(F): 99.2 (26 Aug 2017 16:52), Max: 99.2 (26 Aug 2017 16:52)  HR: 88 (26 Aug 2017 16:52) (88 - 88)  BP: 140/100 (26 Aug 2017 16:52) (140/100 - 140/100)  BP(mean): --  RR: 20 (26 Aug 2017 16:52) (20 - 20)  SpO2: 100% (26 Aug 2017 16:52) (100% - 100%)    Physical exam   General - pt sitting in ed chair comfortably, has some facial twitching of the right eye  MS - aaox3, langue fluent, good fund of knowledge  The rest of the neurological exam not completed because pt was in the process of walking out of the ED upon initial pt encounter.     Labs                      12.6   8.12  )-----------( 344      ( 26 Aug 2017 19:26 )             42.0   08-26    143  |  104  |  12  ----------------------------<  86  3.9   |  21<L>  |  0.88    Ca    9.3      26 Aug 2017 18:43    TPro  8.1  /  Alb  4.5  /  TBili  0.2  /  DBili  x   /  AST  17  /  ALT  18  /  AlkPhos  102  08-26    Imaging  CT head (8/26) - official report pending    MRI brain (8/16) - Stable 4th and bilateral lateral ventricular ventriculostomy catheters. Ventricular volumes are stable in size since brain MRI 3/3/17. No interval change since brain MRI 3/3/17, given differences in technique.    MRI lumber spine (7/28) - Prominent epidural fat the level of L5-S1 with effacement of thecal sac which is new compared to the prior examination. Otherwise no spinal canal or neural foraminal narrowing.

## 2017-08-26 NOTE — ED ADULT NURSE NOTE - CHIEF COMPLAINT QUOTE
hx of hydrochephalus with  shunt in place. patient c/o HA, numbness in the extremities x 1 week. denies visual changes. Patient was seen last friday at [facility] had MRI and was told to f/u with neurosurgeon however could not get appointment.

## 2017-08-26 NOTE — ED PROVIDER NOTE - NOTES
Recommends CT head w/o contrast.  Does not recommend XRay  shuntogram.  Recommends Neurology consult.

## 2017-08-26 NOTE — CONSULT NOTE ADULT - SUBJECTIVE AND OBJECTIVE BOX
NEUROSURGERY    Patient is a 26y old  Female who presents with a chief complaint of headache with bilateral numbness and tingling of extremities x 3 monika    HPI:    · HPI Objective Statement: Pt is a 27 y/o F nonsmoker PMHx hydrocephalus, s/p  shunt, h/o  shunt revisions (last one was 2016), chronic headaches p/w numbness, tingling and weakness x 3 days.  Pt states she has had gradual onset bilateral upper and lower extremity numbness and tingling paresthesias associated with bilateral hand weakness.  Pt states she was seen in ED 10 days ago for right sided numbness, weakness, had a normal Xray  shuntogram and MRI; was seen by neurosurgery and cleared for discharge.  Pt endorses headache, which is the same headache she has had for several years.  Pt denies any fevers, chills, nausea, vomiting, visual/auditory disturbances, head trauma, neck pain, chest pain, abdominal pain, use of blood thinners, inability to walk.	    Vitals    ICU Vital Signs Last 24 Hrs  T(C): 37.3 (26 Aug 2017 16:52), Max: 37.3 (26 Aug 2017 16:52)  T(F): 99.2 (26 Aug 2017 16:52), Max: 99.2 (26 Aug 2017 16:52)  HR: 88 (26 Aug 2017 16:52) (88 - 88)  BP: 140/100 (26 Aug 2017 16:52) (140/100 - 140/100)  BP(mean): --  ABP: --  ABP(mean): --  RR: 20 (26 Aug 2017 16:52) (20 - 20)  SpO2: 100% (26 Aug 2017 16:52) (100% - 100%)    Exam    Awake, alert, responsive ,conversant  Pupils = R ,EOM lt eye strabimus  FC+ on all 4 ext, MCKENNA with good strength  Sensory Fct decreased bilaterally  No abnormal posturing  Shunt pumps and fills easily  Hemodynamically stable    Labs                          12.6   8.12  )-----------( 344      ( 26 Aug 2017 19:26 )             42.0     26 Aug 2017 18:43    143    |  104    |  12     ----------------------------<  86     3.9     |  21     |  0.88     Ca    9.3        26 Aug 2017 18:43    TPro  8.1    /  Alb  4.5    /  TBili  0.2    /  DBili  x      /  AST  17     /  ALT  18     /  AlkPhos  102    26 Aug 2017 18:43    LIVER FUNCTIONS - ( 26 Aug 2017 18:43 )  Alb: 4.5 g/dL / Pro: 8.1 g/dL / ALK PHOS: 102 u/L / ALT: 18 u/L / AST: 17 u/L / GGT: x           Radiology    Brain CT- ventricular size and configuration unchanged from prior studies. Ventricular catheter positions unchanged

## 2017-08-28 ENCOUNTER — APPOINTMENT (OUTPATIENT)
Dept: MRI IMAGING | Facility: HOSPITAL | Age: 27
End: 2017-08-28
Payer: MEDICAID

## 2017-08-28 ENCOUNTER — OUTPATIENT (OUTPATIENT)
Dept: OUTPATIENT SERVICES | Facility: HOSPITAL | Age: 27
LOS: 1 days | End: 2017-08-28

## 2017-08-28 DIAGNOSIS — G91.9 HYDROCEPHALUS, UNSPECIFIED: ICD-10-CM

## 2017-08-28 DIAGNOSIS — Z98.890 OTHER SPECIFIED POSTPROCEDURAL STATES: Chronic | ICD-10-CM

## 2017-08-28 PROCEDURE — 72141 MRI NECK SPINE W/O DYE: CPT | Mod: 26

## 2017-08-31 ENCOUNTER — INPATIENT (INPATIENT)
Facility: HOSPITAL | Age: 27
LOS: 0 days | Discharge: HOME | End: 2017-09-01
Attending: NEUROLOGICAL SURGERY | Admitting: NEUROLOGICAL SURGERY
Payer: MEDICAID

## 2017-08-31 VITALS
HEART RATE: 93 BPM | OXYGEN SATURATION: 100 % | SYSTOLIC BLOOD PRESSURE: 141 MMHG | RESPIRATION RATE: 16 BRPM | TEMPERATURE: 98 F | DIASTOLIC BLOOD PRESSURE: 72 MMHG

## 2017-08-31 DIAGNOSIS — Z98.890 OTHER SPECIFIED POSTPROCEDURAL STATES: Chronic | ICD-10-CM

## 2017-08-31 DIAGNOSIS — G91.9 HYDROCEPHALUS, UNSPECIFIED: ICD-10-CM

## 2017-08-31 LAB
ALBUMIN SERPL ELPH-MCNC: 4.3 G/DL — SIGNIFICANT CHANGE UP (ref 3.3–5)
ALP SERPL-CCNC: 95 U/L — SIGNIFICANT CHANGE UP (ref 40–120)
ALT FLD-CCNC: 16 U/L — SIGNIFICANT CHANGE UP (ref 4–33)
APTT BLD: 25.2 SEC — LOW (ref 27.5–37.4)
AST SERPL-CCNC: 12 U/L — SIGNIFICANT CHANGE UP (ref 4–32)
BASE EXCESS BLDV CALC-SCNC: 3 MMOL/L — SIGNIFICANT CHANGE UP
BASOPHILS # BLD AUTO: 0.01 K/UL — SIGNIFICANT CHANGE UP (ref 0–0.2)
BASOPHILS NFR BLD AUTO: 0.1 % — SIGNIFICANT CHANGE UP (ref 0–2)
BILIRUB SERPL-MCNC: 0.3 MG/DL — SIGNIFICANT CHANGE UP (ref 0.2–1.2)
BLD GP AB SCN SERPL QL: NEGATIVE — SIGNIFICANT CHANGE UP
BLOOD GAS VENOUS - CREATININE: 0.89 MG/DL — SIGNIFICANT CHANGE UP (ref 0.5–1.3)
BUN SERPL-MCNC: 11 MG/DL — SIGNIFICANT CHANGE UP (ref 7–23)
CALCIUM SERPL-MCNC: 9.4 MG/DL — SIGNIFICANT CHANGE UP (ref 8.4–10.5)
CHLORIDE BLDV-SCNC: 108 MMOL/L — SIGNIFICANT CHANGE UP (ref 96–108)
CHLORIDE SERPL-SCNC: 102 MMOL/L — SIGNIFICANT CHANGE UP (ref 98–107)
CO2 SERPL-SCNC: 23 MMOL/L — SIGNIFICANT CHANGE UP (ref 22–31)
CREAT SERPL-MCNC: 0.91 MG/DL — SIGNIFICANT CHANGE UP (ref 0.5–1.3)
CRP SERPL-MCNC: 1.7 MG/L — SIGNIFICANT CHANGE UP
EOSINOPHIL # BLD AUTO: 0 K/UL — SIGNIFICANT CHANGE UP (ref 0–0.5)
EOSINOPHIL NFR BLD AUTO: 0 % — SIGNIFICANT CHANGE UP (ref 0–6)
GAS PNL BLDV: 136 MMOL/L — SIGNIFICANT CHANGE UP (ref 136–146)
GLUCOSE BLDV-MCNC: 111 — HIGH (ref 70–99)
GLUCOSE SERPL-MCNC: 104 MG/DL — HIGH (ref 70–99)
HCO3 BLDV-SCNC: 26 MMOL/L — SIGNIFICANT CHANGE UP (ref 20–27)
HCT VFR BLD CALC: 40.7 % — SIGNIFICANT CHANGE UP (ref 34.5–45)
HCT VFR BLDV CALC: 38.4 % — SIGNIFICANT CHANGE UP (ref 34.5–45)
HGB BLD-MCNC: 12 G/DL — SIGNIFICANT CHANGE UP (ref 11.5–15.5)
HGB BLDV-MCNC: 12.5 G/DL — SIGNIFICANT CHANGE UP (ref 11.5–15.5)
IMM GRANULOCYTES # BLD AUTO: 0.02 # — SIGNIFICANT CHANGE UP
IMM GRANULOCYTES NFR BLD AUTO: 0.3 % — SIGNIFICANT CHANGE UP (ref 0–1.5)
INR BLD: 1.08 — SIGNIFICANT CHANGE UP (ref 0.88–1.17)
LACTATE BLDV-MCNC: 1.6 MMOL/L — SIGNIFICANT CHANGE UP (ref 0.5–2)
LYMPHOCYTES # BLD AUTO: 2.12 K/UL — SIGNIFICANT CHANGE UP (ref 1–3.3)
LYMPHOCYTES # BLD AUTO: 27.4 % — SIGNIFICANT CHANGE UP (ref 13–44)
MCHC RBC-ENTMCNC: 22 PG — LOW (ref 27–34)
MCHC RBC-ENTMCNC: 29.5 % — LOW (ref 32–36)
MCV RBC AUTO: 74.7 FL — LOW (ref 80–100)
MONOCYTES # BLD AUTO: 0.42 K/UL — SIGNIFICANT CHANGE UP (ref 0–0.9)
MONOCYTES NFR BLD AUTO: 5.4 % — SIGNIFICANT CHANGE UP (ref 2–14)
NEUTROPHILS # BLD AUTO: 5.17 K/UL — SIGNIFICANT CHANGE UP (ref 1.8–7.4)
NEUTROPHILS NFR BLD AUTO: 66.8 % — SIGNIFICANT CHANGE UP (ref 43–77)
NRBC # FLD: 0 — SIGNIFICANT CHANGE UP
PCO2 BLDV: 45 MMHG — SIGNIFICANT CHANGE UP (ref 41–51)
PH BLDV: 7.4 PH — SIGNIFICANT CHANGE UP (ref 7.32–7.43)
PLATELET # BLD AUTO: 328 K/UL — SIGNIFICANT CHANGE UP (ref 150–400)
PMV BLD: 10.1 FL — SIGNIFICANT CHANGE UP (ref 7–13)
PO2 BLDV: 37 MMHG — SIGNIFICANT CHANGE UP (ref 35–40)
POTASSIUM BLDV-SCNC: 3.5 MMOL/L — SIGNIFICANT CHANGE UP (ref 3.4–4.5)
POTASSIUM SERPL-MCNC: 3.8 MMOL/L — SIGNIFICANT CHANGE UP (ref 3.5–5.3)
POTASSIUM SERPL-SCNC: 3.8 MMOL/L — SIGNIFICANT CHANGE UP (ref 3.5–5.3)
PROT SERPL-MCNC: 7.9 G/DL — SIGNIFICANT CHANGE UP (ref 6–8.3)
PROTHROM AB SERPL-ACNC: 12.1 SEC — SIGNIFICANT CHANGE UP (ref 9.8–13.1)
RBC # BLD: 5.45 M/UL — HIGH (ref 3.8–5.2)
RBC # FLD: 14.6 % — HIGH (ref 10.3–14.5)
RH IG SCN BLD-IMP: POSITIVE — SIGNIFICANT CHANGE UP
SAO2 % BLDV: 63.7 % — SIGNIFICANT CHANGE UP (ref 60–85)
SODIUM SERPL-SCNC: 139 MMOL/L — SIGNIFICANT CHANGE UP (ref 135–145)
WBC # BLD: 7.74 K/UL — SIGNIFICANT CHANGE UP (ref 3.8–10.5)
WBC # FLD AUTO: 7.74 K/UL — SIGNIFICANT CHANGE UP (ref 3.8–10.5)

## 2017-08-31 PROCEDURE — 77011 CT SCAN FOR LOCALIZATION: CPT | Mod: 26

## 2017-08-31 RX ORDER — ONDANSETRON 8 MG/1
4 TABLET, FILM COATED ORAL ONCE
Qty: 0 | Refills: 0 | Status: DISCONTINUED | OUTPATIENT
Start: 2017-08-31 | End: 2017-09-01

## 2017-08-31 RX ORDER — HYDROMORPHONE HYDROCHLORIDE 2 MG/ML
1 INJECTION INTRAMUSCULAR; INTRAVENOUS; SUBCUTANEOUS
Qty: 0 | Refills: 0 | Status: DISCONTINUED | OUTPATIENT
Start: 2017-08-31 | End: 2017-09-01

## 2017-08-31 RX ORDER — SODIUM CHLORIDE 9 MG/ML
1000 INJECTION INTRAMUSCULAR; INTRAVENOUS; SUBCUTANEOUS
Qty: 0 | Refills: 0 | Status: DISCONTINUED | OUTPATIENT
Start: 2017-08-31 | End: 2017-09-01

## 2017-08-31 RX ORDER — DOCUSATE SODIUM 100 MG
100 CAPSULE ORAL THREE TIMES A DAY
Qty: 0 | Refills: 0 | Status: DISCONTINUED | OUTPATIENT
Start: 2017-08-31 | End: 2017-09-01

## 2017-08-31 RX ORDER — ACETAMINOPHEN 500 MG
650 TABLET ORAL EVERY 6 HOURS
Qty: 0 | Refills: 0 | Status: DISCONTINUED | OUTPATIENT
Start: 2017-08-31 | End: 2017-09-01

## 2017-08-31 RX ORDER — CEFAZOLIN SODIUM 1 G
2000 VIAL (EA) INJECTION EVERY 8 HOURS
Qty: 0 | Refills: 0 | Status: COMPLETED | OUTPATIENT
Start: 2017-09-01 | End: 2017-09-01

## 2017-08-31 RX ORDER — HYDROMORPHONE HYDROCHLORIDE 2 MG/ML
0.5 INJECTION INTRAMUSCULAR; INTRAVENOUS; SUBCUTANEOUS
Qty: 0 | Refills: 0 | Status: DISCONTINUED | OUTPATIENT
Start: 2017-08-31 | End: 2017-09-01

## 2017-08-31 RX ORDER — PANTOPRAZOLE SODIUM 20 MG/1
40 TABLET, DELAYED RELEASE ORAL DAILY
Qty: 0 | Refills: 0 | Status: DISCONTINUED | OUTPATIENT
Start: 2017-08-31 | End: 2017-09-01

## 2017-08-31 RX ADMIN — PANTOPRAZOLE SODIUM 40 MILLIGRAM(S): 20 TABLET, DELAYED RELEASE ORAL at 15:53

## 2017-08-31 RX ADMIN — SODIUM CHLORIDE 75 MILLILITER(S): 9 INJECTION INTRAMUSCULAR; INTRAVENOUS; SUBCUTANEOUS at 14:15

## 2017-08-31 RX ADMIN — SODIUM CHLORIDE 75 MILLILITER(S): 9 INJECTION INTRAMUSCULAR; INTRAVENOUS; SUBCUTANEOUS at 21:00

## 2017-08-31 NOTE — ED PROVIDER NOTE - MEDICAL DECISION MAKING DETAILS
25 yo F with history of hydrocephalus with  shunt with multiple revisions presenting with symptoms similar to prior revision - obstruction only revealed with actual intervention - imaging studies normal in past - neurosurgery consulted - preops ordered - dispo pending neurosurgery

## 2017-08-31 NOTE — H&P ADULT - NSHPPHYSICALEXAM_GEN_ALL_CORE
Awake alert Oriented x 3  Slight nystagmus to lateral gaze- no gaze palsy  No facial droop  PERRL  Motor exam generally 4+/5 in all extremities (?lack of patient effort)  No proprioception loss  DTR 2+  No clonus, No hoffmans  Toes downgoing  In my exam sensory intact b/l  Shunt valve felt- pumping and refilling  Abdomen soft, non distended, non tender  + h/o flatus. Last BM this AM

## 2017-08-31 NOTE — CONSULT NOTE ADULT - SUBJECTIVE AND OBJECTIVE BOX
SICU Consultation Note  =====================================================  Surgery Information   Case Duration: 2 hours	EBL: 10 IV Fluids: 2L	Blood Products: 0   Complications: None    HISTORY  HPI - Pt is a 26 year old female, nonsmoker with a PMHx hydrocephalus, s/p  shunt, h/o  shunt revisions (last one was March 2016) as well as cranial vault expansion, chronic headaches p/w numbness, tingling and weakness x 2 weeks.  Pt states she has had gradual onset bilateral upper and lower extremity numbness and tingling paresthesias associated with bilateral hand weakness.  Pt states she was seen in ED 6 days ago for right sided numbness, weakness but had signed out AMA after only CT head was obtained. Today pt reports having pin and needle like sensations from her neck down, arm legs, abdominal area and back. She reports that it intermittently gets worse Pt endorses headache, which is the same headache she has had for several years. She reports having pain in her neck and lumbar pain as well. Pt denies any fevers, chills, nausea, vomiting, visual/auditory disturbances, head trauma, neck pain, chest pain, abdominal pain, use of blood thinners, inability to walk. Patient presented with similar symptoms in March of 2016 which all imaging negative and per operative note from Dr. Santana patient has malfunction with leakage from right angle connector and lateral ventricular catheter (31 Aug 2017 13:41)    Patient is now post-op from revision of right lateral ventricle  shunt. Procedure was uncomplicated. No significant neurological complaints at this time. No weakness/numbness/paresthesias/vision change.    PAST MEDICAL & SURGICAL HISTORY:  Craniosynostosis  Meniere disease: Pt  discontinued   triamterene/HCTZ,  stopped March. Pt states MD aware  Obstructive hydrocephalus:  shunt placed at birth with multiple revisions for malfunction. LAST REVISION 02/2015  Strabismus  Personal History of Hydrocephalus (ICD9 V12.49)  S/P craniotomy: cranial vault expansion  Ventriculo-Peritoneal Shunt Status (ICD9 V45.2):  shunt placed at birth with multiple revisions 1990 2002 x2 2003 2004 2007 2009 2011 2015 .    FAMILY HISTORY:  No pertinent family history    ADVANCE DIRECTIVES: Full Code    REVIEW OF SYSTEMS:  10 point negative    HOME MEDICATIONS:  See chart    CURRENT MEDICATIONS:   --------------------------------------------------------------------------------------  Neurologic Medications  acetaminophen   Tablet 650 milliGRAM(s) Oral every 6 hours PRN For Temp greater than 38 C (100.4 F)  acetaminophen   Tablet. 650 milliGRAM(s) Oral every 6 hours PRN Mild Pain (1 - 3)  HYDROmorphone  Injectable 0.5 milliGRAM(s) IV Push every 10 minutes PRN Moderate Pain  HYDROmorphone  Injectable 1 milliGRAM(s) IV Push every 10 minutes PRN Severe Pain (7 - 10)  ondansetron Injectable 4 milliGRAM(s) IV Push once PRN Nausea and/or Vomiting    Respiratory Medications    Cardiovascular Medications    Gastrointestinal Medications  sodium chloride 0.9%. 1000 milliLiter(s) IV Continuous <Continuous>  pantoprazole  Injectable 40 milliGRAM(s) IV Push daily  docusate sodium 100 milliGRAM(s) Oral three times a day    Genitourinary Medications    Hematologic/Oncologic Medications    Antimicrobial/Immunologic Medications    Endocrine/Metabolic Medications    Topical/Other Medications    --------------------------------------------------------------------------------------    VITAL SIGNS, INS/OUTS (last 24 hours):  --------------------------------------------------------------------------------------  T(C): 36.5 (08-31-17 @ 20:25), Max: 37.2 (08-31-17 @ 16:15)  HR: 95 (08-31-17 @ 16:44) (79 - 95)  BP: 143/84 (08-31-17 @ 16:44) (120/52 - 143/84)  BP(mean): --  ABP: --  ABP(mean): --  RR: 16 (08-31-17 @ 16:44) (16 - 16)  SpO2: 100% (08-31-17 @ 16:44) (100% - 100%)  Wt(kg): --  CVP(mm Hg): --  CI: --  CAPILLARY BLOOD GLUCOSE       N/A      08-31 @ 07:01  -  08-31 @ 21:00  --------------------------------------------------------  IN:    sodium chloride 0.9%.: 50 mL  Total IN: 50 mL    OUT:    Voided: 200 mL  Total OUT: 200 mL    Total NET: -150 mL        --------------------------------------------------------------------------------------    EXAM  NEUROLOGY  GCS: 15  Exam: Normal, NAD, alert, oriented x 3, no focal deficits.    HEENT  Exam: Normocephalic, atraumatic.  EOMI, PERRL    RESPIRATORY  Exam: Normal effort    CARDIOVASCULAR  Exam: S1, S2.  Regular rate and rhythm    GI/NUTRITION  Exam: Abdomen soft, Non-tender, Non-distended    VASCULAR  Exam: Extremities warm, pink, well-perfused.    MUSCULOSKELETAL  Exam: All extremities moving spontaneously without limitations.    SKIN:  Exam: Good skin turgor, no skin breakdown.    METABOLIC/FLUIDS/ELECTROLYTES  sodium chloride 0.9%. 1000 milliLiter(s) IV Continuous <Continuous>      HEMATOLOGIC  [x] DVT Prophylaxis: SCDs, holding chemoppx at this time  Transfusions:	[] PRBC	[] Platelets		[] FFP	[] Cryoprecipitate    INFECTIOUS DISEASE  Antimicrobials/Immunologic Medications:    Tubes/Lines/Drains   [x] Peripheral IV  [] Central Venous Line     	[] R	[] L	[] IJ	[] Fem	[] SC	Date Placed:   [] Arterial Line		[] R	[] L	[] Fem	[] Rad	[] Ax	Date Placed:   [] PICC:         	[] Midline		[] Mediport  [] Urinary Catheter		Date Placed:     LABS  --------------------------------------------------------------------------------------  CBC (08-31 @ 11:46)                          12.0                     7.74    )--------------(  328        66.8  % Neuts, 27.4  % Lymphs, ANC: 5.17                            40.7      BMP (08-31 @ 11:46)       139     |  102     |  11    			Ca++ --      Ca 9.4          ---------------------------------( 104<H>		Mg --           3.8     |  23      |  0.91  			Ph --        LFTs (08-31 @ 11:46)      TPro 7.9 / Alb 4.3 / TBili 0.3 / DBili -- / AST 12 / ALT 16 / AlkPhos 95    Coags (08-31 @ 11:46)  aPTT 25.2<L> / INR 1.08 / PT 12.1        VBG (08-31 @ 11:43)     7.40 / 45 / 37 / 26 / 3.0 / 63.7%      Lactate: 1.6    --------------------------------------------------------------------------------------      ASSESSMENT:  26y Female w/ complex hydrocephalus s/p revision of  shunt    PLAN:  Neurologic: q1h neuro checks, tylenol and dilaudid PRN for analgesia  Respiratory: IS, monitor pulse ox  Cardiovascular: Monitor BP/HR  Gastrointestinal/Nutrition: Regular diet as tolerated  Renal/Genitourinary: Monitor UOP, replete electrolytes PRN  Hematologic: Hold DVT chemoppx at this time given recent neurosurgery  Infectious Disease: Monitor  Tubes/Lines/Drains: PIV  Endocrine: Monitor glucose on BMP  Disposition: Will monitor overnight in PACU

## 2017-08-31 NOTE — ED ADULT TRIAGE NOTE - CHIEF COMPLAINT QUOTE
pt with a c/o Headache and pain radiating down the right side of her neck, with unsteady gait. pt has a  shunt and states she has been seen 3 times within the last week for the same complaint.   pmhx Hydrocephalous

## 2017-08-31 NOTE — ED PROVIDER NOTE - ATTENDING CONTRIBUTION TO CARE
27yo F w/pmh congenital hydrocephalus, s/p  shunt with atleast 10 revisions per pt, (most recent in 2016), seen here recently for same symptoms, presenting for worsening R. sided pain and numbness 27yo F w/pmh congenital hydrocephalus, s/p  shunt with atleast 10 revisions per pt, (most recent in 2016), seen here recently for same symptoms, presenting for worsening right neck pain, and worsening RUE weakness. States right arm was so weak a few days ago that she had trouble holding a small water bottle up. On ROS, reports b/l UE sharp pain and paresthesias that are chronic. No fever/chills, No photophobia/eye pain/changes in vision, No ear pain/sore throat/dysphagia, No chest pain/palpitations, no SOB/cough/wheeze/stridor, No abdominal pain, No N/V/D, no dysuria/frequency/discharge, No rash. 25yo right hand dominant F w/pmh congenital hydrocephalus, s/p  shunt with atleast 10 revisions per pt, (most recent in 2016), seen here recently for same symptoms, presenting for worsening right neck pain, and worsening RUE weakness. States right arm was so weak a few days ago that she had trouble holding a small water bottle up. On ROS, reports b/l UE sharp pain and paresthesias that are chronic. No fever/chills, No photophobia/eye pain/changes in vision, No ear pain/sore throat/dysphagia, No chest pain/palpitations, no SOB/cough/wheeze/stridor, No abdominal pain, No N/V/D, no dysuria/frequency/discharge, No rash.  Gen: Alert, NAD  Head: NC, AT,  EOMI, normal lids/conjunctiva  ENT: normal hearing, patent oropharynx without erythema/exudate, uvula midline  Neck: +supple, no tenderness/meningismus/JVD, +Trachea midline, +TTP right lateral neck/occiput  Chest: no chest wall tenderness, equal chest rise  Pulm: Bilateral BS, normal resp effort, no wheeze/stridor/retractions  CV: RRR, no M/R/G, +dist pulses  Abd: soft, NT/ND, +BS, no hepatosplenomegaly  Rectal: deferred  Mskel: no edema/erythema/cyanosis  Skin: no rash  Neuro: AAOx3, 4/5 extensor strength RUE compared w/ 5/5 on left, CN 2-12 intact.  MDM:  25yo F w/ pmh as above, here for worsening right neck/occiput pain and RUE weakness. Has had complicated hx w/  shunt in the past requiring multiple revisions. Recent MRI did not show acute pathology. Labs, neurosurg consult and likely admission.

## 2017-08-31 NOTE — BRIEF OPERATIVE NOTE - OPERATION/FINDINGS
Revision of VPS, replacement of R lateral ventricle catheter, good proximal run off from all catheters except R lateral ventricular catheter

## 2017-08-31 NOTE — ED PROVIDER NOTE - NEUROLOGICAL, MLM
Alert and oriented, decreased strength bilaterally with R worse than left in upper and lower extremities - CN intact

## 2017-08-31 NOTE — H&P ADULT - ASSESSMENT
26 year old  F h/o HCP with  shunt (2 catheters set to 1.5) w/ frequent visits to ER p/w headaches and paresthesias along R side

## 2017-08-31 NOTE — ED PROVIDER NOTE - OBJECTIVE STATEMENT
Patient is a 26F with a hisptory of  shunt with multiple revisions, last revision in 2016, presenting for worsening R. sided pain and numbness.  Patient was here several days ago for similar symptoms but reports the symptoms have worsened.  She called her neurosurgeons office, Dr. Nesbitt, who suggested she come to the ED to see Dr. Santana who is operating at [facility] today.  She did not have an appointment with them until 9/14.  Patient describes her pain as being around her valve with numbness and tingling throughout on the right, crossing over to the left.  She reports the symptoms are the same as she experienced when she required the shunt revision in 2016.  At that time her scans were normal, yet the shunt was found to be dysfunctional during surgery and she improved dramatically with revision.  Declines pain meds

## 2017-08-31 NOTE — ED ADULT NURSE NOTE - OBJECTIVE STATEMENT
Pt received to room 22. Pt A&Ox4 complaining of a headache for the past couple days and pain radiating down her neck also unsteady gait. Pt states she was seen here a few days ago and was discharged, mother at bedside states that she was told by Dr Santana to come to the ED and have him paged for her to be seen by him. As per pts mom these symptoms are similar to the ones she had in 2016 and she needed a revision. Pt ambulatory with assistance. MD at bedside evaluating patient. Will continue to monitor.

## 2017-08-31 NOTE — H&P ADULT - PROBLEM SELECTOR PLAN 1
Case D/w Dr. Santana  Plan for  shunt exploration today  Preop labs sent  Consent to be obtained  STereo CT obtained as guidance

## 2017-08-31 NOTE — H&P ADULT - HISTORY OF PRESENT ILLNESS
HPI - Pt is a 26 year old female, nonsmoker with a PMHx hydrocephalus, s/p  shunt, h/o  shunt revisions (last one was March 2016) as well as cranial vault expansion, chronic headaches p/w numbness, tingling and weakness x 2 weeks.  Pt states she has had gradual onset bilateral upper and lower extremity numbness and tingling paresthesias associated with bilateral hand weakness.  Pt states she was seen in ED 6 days ago for right sided numbness, weakness but had signed out AMA after only CT head was obtained.     Today pt reports having pin and needle like sensations from her neck down, arm legs, abdominal area and back. She reports that it intermittently gets worse Pt endorses headache, which is the same headache she has had for several years. She reports having pain in her neck and lumbar pain as well.     Pt denies any fevers, chills, nausea, vomiting, visual/auditory disturbances, head trauma, neck pain, chest pain, abdominal pain, use of blood thinners, inability to walk.    Patient presented with similar symptoms in March of 2016 which all imaging negative and per operative note from Dr. Santana patient has malfunction with leakage from right angle connector and lateral ventricular catheter

## 2017-08-31 NOTE — H&P ADULT - NSHPLABSRESULTS_GEN_ALL_CORE
Activated Partial Thromboplastin Time in AM (08.31.17 @ 11:46)    Activated Partial Thromboplastin Time: 25.2: NO CLOT DETECTED  Recommended therapeutic heparin range (full dose) for APTT  is 58-99 seconds.  Recommended therapeutic argatroban range is 1.5 to 3.0 times  the baseline APTT value, not to exceed 100 seconds.  Recommended therapeutic refludan range25.2: is 1.5 to 2.5 times  the baseline APTT. SEC    Complete Blood Count in AM (05.09.15 @ 02:42)    WBC Count: 11.9 K/uL    RBC Count: 4.16 M/uL    Hemoglobin: 9.6 g/dL    Hematocrit: 31.1 %    Mean Cell Volume: 74.8 fL    Mean Cell Hemoglobin: 23.1 pg    Mean Cell Hemoglobin Conc: 30.9 %    Red Cell Distrib Width: 14.3 %    Platelet Count - Automated: 226: Test Repeated K/uL

## 2017-09-01 ENCOUNTER — TRANSCRIPTION ENCOUNTER (OUTPATIENT)
Age: 27
End: 2017-09-01

## 2017-09-01 VITALS
DIASTOLIC BLOOD PRESSURE: 71 MMHG | RESPIRATION RATE: 15 BRPM | HEART RATE: 62 BPM | OXYGEN SATURATION: 99 % | SYSTOLIC BLOOD PRESSURE: 124 MMHG

## 2017-09-01 DIAGNOSIS — Z98.2 PRESENCE OF CEREBROSPINAL FLUID DRAINAGE DEVICE: ICD-10-CM

## 2017-09-01 LAB
BUN SERPL-MCNC: 11 MG/DL — SIGNIFICANT CHANGE UP (ref 7–23)
CALCIUM SERPL-MCNC: 8.7 MG/DL — SIGNIFICANT CHANGE UP (ref 8.4–10.5)
CHLORIDE SERPL-SCNC: 105 MMOL/L — SIGNIFICANT CHANGE UP (ref 98–107)
CO2 SERPL-SCNC: 21 MMOL/L — LOW (ref 22–31)
CREAT SERPL-MCNC: 0.92 MG/DL — SIGNIFICANT CHANGE UP (ref 0.5–1.3)
GLUCOSE SERPL-MCNC: 132 MG/DL — HIGH (ref 70–99)
HCT VFR BLD CALC: 37.8 % — SIGNIFICANT CHANGE UP (ref 34.5–45)
HGB BLD-MCNC: 11.7 G/DL — SIGNIFICANT CHANGE UP (ref 11.5–15.5)
MAGNESIUM SERPL-MCNC: 2 MG/DL — SIGNIFICANT CHANGE UP (ref 1.6–2.6)
MCHC RBC-ENTMCNC: 22.9 PG — LOW (ref 27–34)
MCHC RBC-ENTMCNC: 31 % — LOW (ref 32–36)
MCV RBC AUTO: 74.1 FL — LOW (ref 80–100)
NRBC # FLD: 0 — SIGNIFICANT CHANGE UP
PHOSPHATE SERPL-MCNC: 3.4 MG/DL — SIGNIFICANT CHANGE UP (ref 2.5–4.5)
PLATELET # BLD AUTO: 308 K/UL — SIGNIFICANT CHANGE UP (ref 150–400)
PMV BLD: 10.4 FL — SIGNIFICANT CHANGE UP (ref 7–13)
POTASSIUM SERPL-MCNC: 4 MMOL/L — SIGNIFICANT CHANGE UP (ref 3.5–5.3)
POTASSIUM SERPL-SCNC: 4 MMOL/L — SIGNIFICANT CHANGE UP (ref 3.5–5.3)
RBC # BLD: 5.1 M/UL — SIGNIFICANT CHANGE UP (ref 3.8–5.2)
RBC # FLD: 14.5 % — SIGNIFICANT CHANGE UP (ref 10.3–14.5)
SODIUM SERPL-SCNC: 141 MMOL/L — SIGNIFICANT CHANGE UP (ref 135–145)
WBC # BLD: 5.73 K/UL — SIGNIFICANT CHANGE UP (ref 3.8–10.5)
WBC # FLD AUTO: 5.73 K/UL — SIGNIFICANT CHANGE UP (ref 3.8–10.5)

## 2017-09-01 PROCEDURE — 70450 CT HEAD/BRAIN W/O DYE: CPT | Mod: 26

## 2017-09-01 RX ORDER — DOCUSATE SODIUM 100 MG
1 CAPSULE ORAL
Qty: 0 | Refills: 0 | COMMUNITY
Start: 2017-09-01

## 2017-09-01 RX ORDER — OXYCODONE HYDROCHLORIDE 5 MG/1
1 TABLET ORAL
Qty: 18 | Refills: 0 | OUTPATIENT
Start: 2017-09-01 | End: 2017-09-04

## 2017-09-01 RX ORDER — ACETAMINOPHEN 500 MG
2 TABLET ORAL
Qty: 0 | Refills: 0 | COMMUNITY
Start: 2017-09-01

## 2017-09-01 RX ORDER — OXYCODONE HYDROCHLORIDE 5 MG/1
5 TABLET ORAL EVERY 4 HOURS
Qty: 0 | Refills: 0 | Status: DISCONTINUED | OUTPATIENT
Start: 2017-09-01 | End: 2017-09-01

## 2017-09-01 RX ADMIN — Medication 650 MILLIGRAM(S): at 04:26

## 2017-09-01 RX ADMIN — Medication 100 MILLIGRAM(S): at 03:38

## 2017-09-01 RX ADMIN — OXYCODONE HYDROCHLORIDE 5 MILLIGRAM(S): 5 TABLET ORAL at 13:10

## 2017-09-01 RX ADMIN — OXYCODONE HYDROCHLORIDE 5 MILLIGRAM(S): 5 TABLET ORAL at 09:06

## 2017-09-01 RX ADMIN — Medication 650 MILLIGRAM(S): at 03:59

## 2017-09-01 RX ADMIN — Medication 100 MILLIGRAM(S): at 10:35

## 2017-09-01 RX ADMIN — PANTOPRAZOLE SODIUM 40 MILLIGRAM(S): 20 TABLET, DELAYED RELEASE ORAL at 13:16

## 2017-09-01 RX ADMIN — Medication 100 MILLIGRAM(S): at 06:28

## 2017-09-01 RX ADMIN — OXYCODONE HYDROCHLORIDE 5 MILLIGRAM(S): 5 TABLET ORAL at 09:15

## 2017-09-01 NOTE — DISCHARGE NOTE ADULT - MEDICATION SUMMARY - MEDICATIONS TO TAKE
I will START or STAY ON the medications listed below when I get home from the hospital:    oxyCODONE 5 mg oral tablet  -- 1 tab(s) by mouth every 4 hours, As needed, moderate and severe pain MDD:6  -- Indication: For Moderate pain    acetaminophen 325 mg oral tablet  -- 2 tab(s) by mouth every 6 hours, As needed, Mild Pain (1 - 3)  -- Indication: For mild pain    docusate sodium 100 mg oral capsule  -- 1 cap(s) by mouth 3 times a day  -- Indication: For Obtain Over the counter and take until regular bowel movement in acheived

## 2017-09-01 NOTE — PROGRESS NOTE ADULT - SUBJECTIVE AND OBJECTIVE BOX
Neurosurgery postop    States headache, facial paresthesias, and weakness improved  Vital Signs Last 24 Hrs  T(C): 36.7 (01 Sep 2017 00:00), Max: 37.2 (31 Aug 2017 16:15)  T(F): 98.1 (01 Sep 2017 00:00), Max: 98.9 (31 Aug 2017 16:15)  HR: 71 (01 Sep 2017 01:00) (59 - 105)  BP: 121/70 (01 Sep 2017 01:00) (106/80 - 143/84)  BP(mean): --  RR: 15 (01 Sep 2017 01:00) (12 - 21)  SpO2: 99% (01 Sep 2017 01:00) (97% - 100%)    AAO X 3  PERRLA, EOMI  MCKENNA strength 5/5    Dressing clean, dry, intact

## 2017-09-01 NOTE — DISCHARGE NOTE ADULT - CARE PLAN
Principal Discharge DX:	Hydrocephalus  Goal:	s/p exploration of  shunt  Instructions for follow-up, activity and diet:	Regular diet  Being washing hair on Post operative day 4 (Monday).   Call Monday to schedule follow up appointment with Dr. Santana Principal Discharge DX:	Hydrocephalus  Goal:	s/p exploration of  shunt  Instructions for follow-up, activity and diet:	Regular diet  Begin washing hair on Post operative day 4 (Monday).   Call Monday to schedule follow up appointment with Dr. Santana

## 2017-09-01 NOTE — DISCHARGE NOTE ADULT - INSTRUCTIONS
Regular diet Do not take pain medication on an empty stomach.  Increase fluids and fiber in diet to prevent constipation.    DISCHARGE INSTRUCTIONS:  Call 911 for any of the following:   •You have a seizure.   Seek care immediately if:   •Blood soaks through your bandage.  •You have a stiff neck or trouble thinking clearly.  •You have a severe headache.   •You suddenly start to vomit.  Contact your healthcare provider if:   •You have a fever.   •Your wounds become swollen, red, more painful, or have pus coming from them.   •You have questions or concerns about your condition or care.

## 2017-09-01 NOTE — DISCHARGE NOTE ADULT - NS AS ACTIVITY OBS
Walking-Indoors allowed/Walking-Outdoors allowed/No driving while taking oxycodone/Sex allowed/Do not drive or operate machinery/Return to Work/School allowed

## 2017-09-01 NOTE — DISCHARGE NOTE ADULT - HOSPITAL COURSE
HPI - Pt is a 26 year old female, nonsmoker with a PMHx hydrocephalus, s/p  shunt, h/o  shunt revisions (last one was March 2016) as well as cranial vault expansion, chronic headaches p/w numbness, tingling and weakness x 2 weeks.  Pt states she has had gradual onset bilateral upper and lower extremity numbness and tingling paresthesias associated with bilateral hand weakness.  Pt states she was seen in ED 6 days ago for right sided numbness, weakness but had signed out AMA after only CT head was obtained.     Today pt reports having pin and needle like sensations from her neck down, arm legs, abdominal area and back. She reports that it intermittently gets worse Pt endorses headache, which is the same headache she has had for several years. She reports having pain in her neck and lumbar pain as well.     Pt denies any fevers, chills, nausea, vomiting, visual/auditory disturbances, head trauma, neck pain, chest pain, abdominal pain, use of blood thinners, inability to walk.    Patient presented with similar symptoms in March of 2016 which all imaging negative and per operative note from Dr. Santana patient has malfunction with leakage from right angle connector and lateral ventricular catheter     Patient was taken to OR on 8/31 for exploration of  shunt. All catheters appeared to be working well. Lateral ventricle catheter replaced as it was slower to flow compared to other tube.   Patient monitored in PACU  CT head post op on 9/1 showed good placement of catheter. No blood. R sided paresthesias resolved.   Stable for discharge home on 9/1

## 2017-09-01 NOTE — DISCHARGE NOTE ADULT - PLAN OF CARE
s/p exploration of  shunt Regular diet  Being washing hair on Post operative day 4 (Monday).   Call Monday to schedule follow up appointment with Dr. Santana Regular diet  Begin washing hair on Post operative day 4 (Monday).   Call Monday to schedule follow up appointment with Dr. Santana

## 2017-09-01 NOTE — DISCHARGE NOTE ADULT - FUNCTIONAL SCREEN CURRENT LEVEL: DRESSING, MLM
[facility]  [id number] [place] [address] Dr Aysha HOBBS [id number]           Patient Discharge Instructions   Our goal is to set you up for success. This packet includes information on your condition, medications, and your home care.  It will help you care for yourself to prevent having to return to the hospital.     Please ask your nurse if you have any questions.      There are many details to remember when preparing to leave the hospital. Here is what you will need to do:    1. Take your medicine. If you are prescribed medications, review your Medication List on the following pages. You may have new medications to  at the pharmacy and others that you'll need to stop taking. Review the instructions for how and when to take your medications. Talk with your doctor or nurses if you are unsure of what to do.     2. Go to your follow-up appointments. Specific follow-up information is listed in the following pages. Your may be contacted by a nurse or clinical provider about future appointments. Be sure we have all of the phone numbers to reach you. Please contact your provider's office if you are unable to make an appointment.     3. Watch for warning signs. Your doctor or nurse will give you detailed warning signs to watch for and when to call for assistance. These instructions may also include educational information about your condition. If you experience any of warning signs to your health, call your doctor.               ** Verify the list of medication(s) below is accurate and up to date. Carry this with you in case of emergency. If your medications have changed, please notify your healthcare provider.             Medication List      START taking these medications        Additional Info                      albuterol-ipratropium 2.5mg-0.5mg/3mL 0.5 mg-3 mg(2.5 mg base)/3 mL nebulizer solution   Commonly known as:  DUO-NEB   Quantity:  120 vial   Refills:  0   Dose:  3 mL    Last time this was  given:  3 mLs on 3/28/2017  8:31 PM   Instructions:  Take 3 mLs by nebulization every 6 (six) hours while awake. Rescue     Begin Date    AM    Noon    PM    Bedtime       metronidazole 500 MG tablet   Commonly known as:  FLAGYL   Quantity:  30 tablet   Refills:  0   Dose:  500 mg   Indications:  Intra-abdominal    Last time this was given:  500 mg on 3/29/2017 12:29 PM   Instructions:  Take 1 tablet (500 mg total) by mouth every 8 (eight) hours.     Begin Date    AM    Noon    PM    Bedtime         CHANGE how you take these medications        Additional Info                      BYSTOLIC 5 MG Tab   Quantity:  180 tablet   Refills:  6   What changed:  See the new instructions.   Generic drug:  nebivolol    Last time this was given:  5 mg on 3/29/2017 10:09 AM   Instructions:  TAKE ONE TABLET BY MOUTH TWICE DAILY     Begin Date    AM    Noon    PM    Bedtime       guaifenesin 600 mg 12 hr tablet   Commonly known as:  MUCINEX   Quantity:  60 tablet   Refills:  11   Dose:  1200 mg   What changed:  additional instructions    Instructions:  Take 2 tablets (1,200 mg total) by mouth 2 (two) times daily.     Begin Date    AM    Noon    PM    Bedtime         CONTINUE taking these medications        Additional Info                      albuterol 90 mcg/actuation inhaler   Commonly known as:  PROAIR HFA   Quantity:  1 Inhaler   Refills:  11   Dose:  2 puff    Instructions:  Inhale 2 puffs into the lungs every 6 (six) hours as needed.     Begin Date    AM    Noon    PM    Bedtime       ANASPAZ 0.125 mg Tbdl   Refills:  0   Dose:  0.125 mg   Generic drug:  hyoscyamine    Instructions:  Take 0.125 mg by mouth every 6 (six) hours as needed for Cramping.     Begin Date    AM    Noon    PM    Bedtime       aspirin 81 MG EC tablet   Commonly known as:  ECOTRIN   Quantity:  30 tablet   Refills:  6   Dose:  81 mg    Instructions:  Take 1 tablet (81 mg total) by mouth once daily.     Begin Date    AM    Noon    PM    Bedtime        atorvastatin 40 MG tablet   Commonly known as:  LIPITOR   Quantity:  90 tablet   Refills:  0    Instructions:  TAKE ONE TABLET BY MOUTH NIGHTLY     Begin Date    AM    Noon    PM    Bedtime       azelastine 137 mcg (0.1 %) nasal spray   Commonly known as:  ASTELIN   Quantity:  30 mL   Refills:  12   Dose:  2 spray    Instructions:  2 sprays (274 mcg total) by Nasal route 2 (two) times daily.     Begin Date    AM    Noon    PM    Bedtime       benzonatate 100 MG capsule   Commonly known as:  TESSALON   Quantity:  30 capsule   Refills:  1   Dose:  100 mg    Instructions:  Take 1 capsule (100 mg total) by mouth 3 (three) times daily as needed for Cough.     Begin Date    AM    Noon    PM    Bedtime       budesonide-formoterol 160-4.5 mcg 160-4.5 mcg/actuation Hfaa   Commonly known as:  SYMBICORT   Quantity:  1 Inhaler   Refills:  12   Dose:  2 puff    Instructions:  Inhale 2 puffs into the lungs every 12 (twelve) hours. Wash out mouth after using     Begin Date    AM    Noon    PM    Bedtime       carboxymethylcellulose 0.5 % Dpet   Commonly known as:  REFRESH PLUS   Refills:  0   Dose:  1 drop    Instructions:  Place 1 drop into both eyes 3 (three) times daily as needed.     Begin Date    AM    Noon    PM    Bedtime       diclofenac sodium 1 % Gel   Commonly known as:  VOLTAREN   Quantity:  3 Tube   Refills:  4   Dose:  2 g    Instructions:  Apply 2 g topically once daily.     Begin Date    AM    Noon    PM    Bedtime       dofetilide 125 MCG Cap   Commonly known as:  TIKOSYN   Quantity:  60 capsule   Refills:  1   Dose:  125 mcg    Last time this was given:  125 mcg on 3/29/2017 10:10 AM   Instructions:  Take 1 capsule (125 mcg total) by mouth every 12 (twelve) hours.     Begin Date    AM    Noon    PM    Bedtime       escitalopram oxalate 10 MG tablet   Commonly known as:  LEXAPRO   Quantity:  90 tablet   Refills:  3    Last time this was given:  10 mg on 3/29/2017 10:09 AM   Instructions:  TAKE ONE-HALF TO ONE  TABLET BY MOUTH EVERY DAY     Begin Date    AM    Noon    PM    Bedtime       fluticasone 50 mcg/actuation nasal spray   Commonly known as:  FLONASE   Quantity:  1 Bottle   Refills:  11   Dose:  2 spray    Instructions:  2 sprays by Each Nare route once daily.     Begin Date    AM    Noon    PM    Bedtime       furosemide 20 MG tablet   Commonly known as:  LASIX   Quantity:  90 tablet   Refills:  4   Dose:  20 mg    Instructions:  Take 1 tablet (20 mg total) by mouth as directed.     Begin Date    AM    Noon    PM    Bedtime       hydroxychloroquine 200 mg tablet   Commonly known as:  PLAQUENIL   Quantity:  90 tablet   Refills:  0    Last time this was given:  200 mg on 3/29/2017 10:15 AM   Instructions:  TAKE ONE TABLET BY MOUTH ONCE DAILY     Begin Date    AM    Noon    PM    Bedtime       nitroGLYCERIN 0.4 MG SL tablet   Commonly known as:  NITROSTAT   Quantity:  25 tablet   Refills:  6   Dose:  0.4 mg    Instructions:  Place 1 tablet (0.4 mg total) under the tongue every 5 (five) minutes as needed for Chest pain.     Begin Date    AM    Noon    PM    Bedtime       pantoprazole 40 MG tablet   Commonly known as:  PROTONIX   Quantity:  90 tablet   Refills:  3   Dose:  40 mg    Instructions:  Take 1 tablet (40 mg total) by mouth once daily.     Begin Date    AM    Noon    PM    Bedtime       phenobarb-hyoscy-atropine-scop 16.2-0.1037 -0.0194 mg/5 mL Elix   Commonly known as:  BELLADONNA-PHENOBARBITAL   Quantity:  180 mL   Refills:  1   Dose:  5 mL    Instructions:  Take 5 mLs by mouth daily as needed (abdominal pain).     Begin Date    AM    Noon    PM    Bedtime       potassium chloride 10 MEQ Tbsr   Commonly known as:  KLOR-CON   Quantity:  30 tablet   Refills:  6   Dose:  10 mEq    Instructions:  Take 1 tablet (10 mEq total) by mouth once daily.     Begin Date    AM    Noon    PM    Bedtime       pramoxine 1 % Foam   Quantity:  15 g   Refills:  1    Instructions:  Place rectally 3 (three) times daily as needed.      Begin Date    AM    Noon    PM    Bedtime       PREMARIN vaginal cream   Quantity:  30 g   Refills:  3   Generic drug:  conjugated estrogens    Instructions:  PLACE 1 GRAM VAGINALLY TWICE A WEEK     Begin Date    AM    Noon    PM    Bedtime       RESTASIS 0.05 % ophthalmic emulsion   Quantity:  60 each   Refills:  12   Generic drug:  cycloSPORINE    Instructions:  INSTILL ONE DROP INTO EACH EYE TWICE DAILY     Begin Date    AM    Noon    PM    Bedtime       rivaroxaban 20 mg Tab   Commonly known as:  XARELTO   Quantity:  90 tablet   Refills:  3   Dose:  20 mg    Instructions:  Take 1 tablet (20 mg total) by mouth daily with dinner or evening meal.     Begin Date    AM    Noon    PM    Bedtime       tramadol 50 mg tablet   Commonly known as:  ULTRAM   Quantity:  90 tablet   Refills:  3   Dose:  50 mg    Last time this was given:  50 mg on 3/29/2017 10:16 AM   Instructions:  Take 1 tablet (50 mg total) by mouth 3 (three) times daily as needed.     Begin Date    AM    Noon    PM    Bedtime       triamcinolone acetonide 0.1% 0.1 % ointment   Commonly known as:  KENALOG   Quantity:  60 g   Refills:  1    Instructions:  AAA bid prn     Begin Date    AM    Noon    PM    Bedtime       vitamin D 1000 units Tab   Refills:  0   Dose:  2000 Units    Instructions:  Take 2,000 Units by mouth once daily.     Begin Date    AM    Noon    PM    Bedtime         STOP taking these medications     amoxicillin-clavulanate 875-125mg 875-125 mg per tablet   Commonly known as:  AUGMENTIN       predniSONE 20 MG tablet   Commonly known as:  DELTASONE            Where to Get Your Medications      These medications were sent to [facility] ANJEL MOREL  [address] [facility]  [address] [address]     Phone:  [phone]     albuterol-ipratropium 2.5mg-0.5mg/3mL 0.5 mg-3 mg(2.5 mg base)/3 mL nebulizer solution    metronidazole 500 MG tablet                  Please bring to all follow up appointments:    1. A copy of  your discharge instructions.  2. All medicines you are currently taking in their original bottles.  3. Identification and insurance card.    Please arrive 15 minutes ahead of scheduled appointment time.    Please call 24 hours in advance if you must reschedule your appointment and/or time.        Your Scheduled Appointments     Apr 21, 2017  3:20 PM CDT   Established Patient Visit with Virgilio Perez MD   [facility]    [address] [facility] [address] [place] [address] [phone]            May 11, 2017  9:40 AM CDT   Established Patient Visit with Alexandra Moreno MD   [facility]    [address] [facility] [id number] [phone]            Jul 12, 2017  9:00 AM CDT   Non-Fasting Lab with [facility] ([facility]    [address] [place] [address] [phone]            Jul 12, 2017  9:30 AM CDT   Bone Density with [facility]   [facility] ([facility])    [address] [facility] [address] [place] [address] [phone]            Jul 12, 2017 10:00 AM CDT   Established Patient Visit with Luana Knight PA-C   [facility] ([facility])    [address] [facility] [address] [place] [address] [phone]              Follow-up Information     Follow up with Alexandra Moreno MD. Call in 3 days.    Specialty:  Family Medicine    Why:  A representative from Dr. Moreno's office will be contacting you to schedule an appointment for you within 3 days.    Contact information:    [address]  [place] [id number] [phone]          [facility]    Specialty:  Home Health Services    Why:  Home Health:  SN, PT, OT    Contact information:    [address] [address]  [phone]          Follow up with [facility]    Specialty:  DME Provider    Why:  DME:  Nebulizer    Contact information:    [address] [facility] [address]  [facility]  "[address]  [phone]        Referrals     Future Orders    Ambulatory referral to Home Health         Discharge Instructions     Future Orders    Activity as tolerated     Diet Cardiac     NEBULIZER FOR HOME USE     Questions:    Height:  5' 2" (1.575 m)    Weight:  58.1 kg (128 lb)    Does patient have medical equipment at home?:  other (see comments) Comment - Nebulizer    Length of need (1-99 months):  99        Primary Diagnosis     Your primary diagnosis was:  Anemia Due To Acute Blood Loss      Admission Information     Date & Time Provider Department CSN    3/23/2017 10:16 PM Randy Valdez MD [facility] [id number]      Care Providers     Provider Role Specialty Primary office phone    Randy Valdez MD Attending Provider Internal Medicine [phone]    Addi Kahn MD Consulting Physician  General Surgery [phone]    Randy Valdez MD Team Attending  Internal Medicine [phone]      Your Vitals Were     BP Pulse Temp Resp Height Weight    126/70 93 98.1 °F (36.7 °C) (Oral) 18 5' 2" (1.575 m) 63.6 kg (140 lb 3.4 oz)    SpO2 BMI             98% 25.65 kg/m2         Recent Lab Values        12/27/2012 4/30/2013 10/8/2013 3/13/2014 3/16/2015 1/5/2016 9/6/2016 3/14/2017     10:41 AM  8:20 AM 10:01 AM  8:55 AM  8:00 AM  9:39 AM  2:45 PM  2:01 PM    A1C 6.2 6.5 (H) 6.4 (H) 6.5 (H) 6.8 (H) 6.6 (H) 6.7 (H) 6.3 (H)    Comment for A1C at  2:45 PM on 9/6/2016:  According to ADA guidelines, hemoglobin A1C <7.0% represents  optimal control in non-pregnant diabetic patients.  Different  metrics may apply to specific populations.   Standards of Medical Care in Diabetes - 2016.  For the purpose of screening for the presence of diabetes:  <5.7%     Consistent with the absence of diabetes  5.7-6.4%  Consistent with increasing risk for diabetes   (prediabetes)  >or=6.5%  Consistent with diabetes  Currently no consensus exists for use of hemoglobin A1C  for diagnosis of diabetes for children.      " Comment for A1C at  2:01 PM on 3/14/2017:  According to ADA guidelines, hemoglobin A1C <7.0% represents  optimal control in non-pregnant diabetic patients.  Different  metrics may apply to specific populations.   Standards of Medical Care in Diabetes - 2016.  For the purpose of screening for the presence of diabetes:  <5.7%     Consistent with the absence of diabetes  5.7-6.4%  Consistent with increasing risk for diabetes   (prediabetes)  >or=6.5%  Consistent with diabetes  Currently no consensus exists for use of hemoglobin A1C  for diagnosis of diabetes for children.        Pending Labs     Order Current Status    Prepare RBC 1 Unit Preliminary result    Prepare RBC 1 Unit Preliminary result    Prepare RBC 2 Units; H/H < 7 Preliminary result      Allergies as of 3/29/2017        Reactions    Codeine Nausea And Vomiting    Lisinopril     Other reaction(s): cough    Pacerone [Amiodarone] Nausea And Vomiting    Sulfa (Sulfonamide Antibiotics) Nausea And Vomiting    Celecoxib Palpitations    Ciprofloxacin Hives, Itching, Rash    Colesevelam Rash, Nausea And Vomiting    Doxycycline Rash    Statins-hmg-coa Reductase Inhibitors Rash    Myalgia(can take atorvastatin ok -no rhabdo)per nurse josé antonio and patient  / stomach upset      [facility] On Call     [facility] On Call Nurse Care Line - 24/7 Assistance  Unless otherwise directed by your provider, please contact [facility], our nurse care line that is available for 24/7 assistance.     Registered nurses in the [facility] provide clinical advisement, health education, appointment booking, and other advisory services.  Call for this free service at [phone].        Advance Directives     An advance directive is a document which, in the event you are no longer able to make decisions for yourself, tells your healthcare team what kind of treatment you do or do not want to receive, or who you would like to make those decisions for you.  If you do not currently  have an advance directive, [facility] encourages you to create one.  For more information call:  [phone]), [phone] ([phone]),  or log on to [email or web address].        Smoking Cessation     If you would like to quit smoking:   You may be eligible for free services if you are a [place] resident and started smoking cigarettes before September 1, 1988.  Call the Smoking Cessation Trust ([facility]) toll free at [phone] [phone] or [phone] 416-1775.   Call 6-879-QUIT-NOW if you do not meet the above criteria.            Language Assistance Services     ATTENTION: Language assistance services are available, free of charge. Please call [phone].      ATENCIÓN: Si sirenala racheal, tiene a marte disposición servicios gratuitos de asistencia lingüística. Llame al [phone].     CHÚ Ý: N?u b?n nói Ti?ng Vi?t, có các d?ch v? h? tr? ngôn ng? mi?n phí dành cho b?n. G?i s? [phone].        Blood Transfusion Reaction Signs and Symptoms     The blood you have received has been matched for you as carefully as possible. Most patients who receive a blood transfusion do not experience problems. However, there can be a delayed reaction that happens a few weeks after your blood transfusion. Contact your physician immediately if you experience any NEW SYMPTOMS listed below:     Fever greater than 100.4 degrees    Chills   Yellow color to your skin or eyes(Jaundice)   Back pain, chest pain, or pain at the infusion site   Weakness (more than usual)   Discomfort or uneasiness more than usual ([place]alaise)   Nausea or vomiting   Shortness of breath, wheezing, or coughing   Higher or lower blood pressure than normal   Skin rash, itching, skin redness, or localized swelling (example: hands or feet)   Urinating less than normal   Urine appears reddish or orange and is darker than normal      Remember that some these signs may already exist for you--such as having chronic back pain or high blood  pressure. You only need to look for and report to your doctor any new occurrences since your blood transfusion that are of concern.        Stroke Education              Heart Failure Education       Heart Failure: Being Active  You have a condition called heart failure. Being active doesnt mean that you have to wear yourself out. Even a little movement each day helps to strengthen your heart. If you cant get out to exercise, you can do simple stretching and strengthening exercises at home. These are good ways to keep you well-conditioned and prevent you and your heart from becoming excessively weak.    Ideas to get you started  · Add a little movement to things you do now. Walk to mail letters. Park your car at the far end of the parking lot and walk to the store. Walk up a flight of stairs instead of taking the elevator.  · Choose activities you enjoy. You might walk, swim, or ride an exercise bike. Things like gardening and washing the car count, too. Other possibilities include: washing dishes, walking the dog, walking around the mall, and doing aerobic activities with friends.  · Join a group exercise program at a [facility], a senior center, or a community center. Or look into a hospital cardiac rehabilitation program. Ask your doctor if you qualify.  Tips to keep you going  · Get up and get dressed each day. Go to a coffee shop and read a newspaper or go somewhere that you'll be in the presence of other active people. Youll feel more like being active.  · Make a plan. Choose one or more activities that you enjoy and that you can easily do. Then plan to do at least one each day. You might write your plan on a calendar.  · Go with a friend or a group if you like company. This can help you feel supported and stay motivated, too.  · Plan social events that you enjoy. This will keep you mentally engaged as well as physically motivated to do things you find pleasure in.  For your safety  · Talk with your  healthcare provider before starting an exercise program.  · Exercise indoors when its too hot or too cold outside, or when the air quality is poor. Try walking at a shopping mall.  · Wear socks and sturdy shoes to maintain your balance and prevent falls.  · Start slowly. Do a few minutes several times a day at first. Increase your time and speed little by little.  · Stop and rest whenever you feel tired or get short of breath.  · Dont push yourself on days when you dont feel well.  Date Last Reviewed: 3/20/2016  © 1441-4564 [employer]. [address] [place] [address]. All rights reserved. This information is not intended as a substitute for professional medical care. Always follow your healthcare professional's instructions.              Heart Failure: Evaluating Your Heart  You have a condition called heart failure. To evaluate your condition, your doctor will examine you, ask questions, and do some tests. Along with looking for signs of heart failure, the doctor looks for any other health problems that may have led to heart failure. The results of your evaluation will help your doctor form a treatment plan.  Health history and physical exam  Your visit will start with a health history. Tell the doctor about any symptoms youve noticed and about all medicines you take. Then youll have a physical exam. This includes listening to your heartbeat and breathing. Youll also be checked for swelling (edema) in your legs and neck. When you have fluid buildup or fluid in the lungs, it may be called congestive heart failure.  Diagnosing heart failure     During an echocardiogram, sound waves bounce off the heart. These are converted into a picture on the screen.   The following may be done to help your doctor form a diagnosis:  · X-rays show the size and shape of your heart. These pictures can also show fluid in your lungs.  · An electrocardiogram (ECG or EKG) shows the pattern of your heartbeat.  Small pads (electrodes) are placed on your chest, arms, and legs. Wires connect the pads to the ECG machine, which records your hearts electrical signals. This can give the doctor information about heart function.  · An echocardiogram uses ultrasound waves to show the structure and movement of your heart muscle. This shows how well the heart pumps. It also shows the thickness of the heart walls, and if the heart is enlarged. It is one of the most useful, non-invasive tests as it provides information about the heart's general function. This helps your doctor make treatment decisions.  · Lab tests evaluate small amounts of blood or urine for signs of problems. A BNP lab test can help diagnose and evaluate heart failure. BNP stands for B-type natriuretic peptide. The ventricles secrete more BNP when heart failure worsens. Lab tests can also provide information about metabolic dysfunction or heart dysfunction.  Your treatment plan  Based on the results of your evaluation and tests, your doctor will develop a treatment plan. This plan is designed to relieve some of your heart failure symptoms and help make you more comfortable. Your treatment plan may include:  · Medicine to help your heart work better and improve your quality of life  · Changes in what you eat and drink to help prevent fluid from backing up in your body  · Daily monitoring of your weight and heart failure symptoms to see how well your treatment plan is working  · Exercise to help you stay healthy  · Help with quitting smoking  · Emotional and psychological support to help adjust to the changes  · Referrals to other specialists to make sure you are being treated comprehensively  Date Last Reviewed: 3/21/2016  © 8330-4046 The "[employer]. [address] [place] [address], [place] [address]. All rights reserved. This information is not intended as a substitute for professional medical care. Always follow your healthcare professional's  instructions.              Heart Failure: Making Changes to Your Diet  You have a condition called heart failure. When you have heart failure, excess fluid is more likely to build up in your body because your heart isn't working well. This makes the heart work harder to pump blood. Fluid buildup causes symptoms such as shortness of breath and swelling (edema). This is often referred to as congestive heart failure or CHF. Controlling the amount of salt (sodium) you eat may help stop fluid from building up. Your doctor may also tell you to reduce the amount of fluid you drink.  Reading food labels    Your healthcare provider will tell you how much sodium you can eat each day. Read food labels to keep track. Keep in mind that certain foods are high in salt. These include canned, frozen, and processed foods. Check the amount of sodium in each serving. Watch out for high-sodium ingredients. These include MSG (monosodium glutamate), baking soda, and sodium phosphate.   Eating less salt  Give yourself time to get used to eating less salt. It may take a little while. Here are some tips to help:  · Take the saltshaker off the table. Replace it with salt-free herb mixes and spices.  · Eat fresh or plain frozen vegetables. These have much less salt than canned vegetables.  · Choose low-sodium snacks like sodium-free pretzels, crackers, or air-popped popcorn.  · Dont add salt to your food when youre cooking. Instead, season your foods with pepper, lemon, garlic, or onion.  · When you eat out, ask that your food be cooked without added salt.  · Avoid eating fried foods as these often have a great deal of salt.  If youre told to limit fluids  You may need to limit how much fluid you have to help prevent swelling. This includes anything that is liquid at room temperature, such as ice cream and soup. If your doctor tells you to limit fluid, try these tips:  · Measure drinks in a measuring cup before you drink them. This will  help you meet daily goals.  · Chill drinks to make them more refreshing.  · Suck on frozen lemon wedges to quench thirst.  · Only drink when youre thirsty.  · Chew sugarless gum or suck on hard candy to keep your mouth moist.  · Weigh yourself daily to know if your body's fluid content is rising.  My sodium goal  Your healthcare provider may give you a sodium goal to meet each day. This includes sodium found in food as well as salt that you add. My goal is to eat no more than ___________ mg of sodium per day.     When to call your doctor  Call your doctor right away if you have any symptoms of worsening heart failure. These can include:  · Sudden weight gain  · Increased swelling of your legs or ankles  · Trouble breathing when youre resting or at night  · Increase in the number of pillows you have to sleep on  · Chest pain, pressure, discomfort, or pain in the jaw, neck, or back   Date Last Reviewed: 3/21/2016  © 5581-8672 [employer]. [address] [place] [address]. All rights reserved. This information is not intended as a substitute for professional medical care. Always follow your healthcare professional's instructions.              Heart Failure: Medicines to Help Your Heart    You have a condition called heart failure (also known as congestive heart failure, or CHF). Your doctor will likely prescribe medicines for heart failure and any underlying health problems you have. Most heart failure patients take one or more types of medicinen. Your healthcare provider will work to find the combination of medicines that works best for you.  Heart failure medicines  Here are the most common heart failure medicines:  · ACE inhibitors lower blood pressure and decrease strain on the heart. This makes it easier for the heart to pump. Angiotensin receptor blockers have similar effects. These are prescribed for some patients instead of ACE inhibitors.  · Beta-blockers relieve stress on the heart.  (0) independent They also improve symptoms. They may also improve the heart's pumping action over time.  · Diuretics (also called water pills) help rid your body of excess water. This can help rid your body of swelling (edema). Having less fluid to pump means your heart doesnt have to work as hard. Some diuretics make your body lose a mineral called potassium. Your doctor will tell you if you need to take supplements or eat more foods high in potassium.  · Digoxin helps your heart pump with more strength. This helps your heart pump more blood with each beat. So, more oxygen-rich blood travels to the rest of the body.  · Aldosterone antagonists help alter hormones and decrease strain on the heart.  · Hydralazine and nitrates are two separate medicines used together to treat heart failure. They may come in one combination pill. They lower blood pressure and decrease how hard the heart has to pump.  Medicines for related conditions  Controlling other heart problems helps keep heart failure under control, too. Depending on other heart problems you have, medicines may be prescribed to:  · Lower blood pressure (antihypertensives).  · Lower cholesterol levels (statins).  · Prevent blood clots (anticoagulants or aspirin).  · Keep the heartbeat steady (antiarrhythmics).  Date Last Reviewed: 3/5/2016  © 2362-0465 [employer]. [address] [place] [address], [place] [address]. All rights reserved. This information is not intended as a substitute for professional medical care. Always follow your healthcare professional's instructions.              Heart Failure: Procedures That May Help    The heart is a muscle that pumps oxygen-rich blood to all parts of the body. When you have heart failure, the heart is not able to pump as well as it should. Blood and fluid may back up into the lungs (congestive heart failure), and some parts of the body dont get enough oxygen-rich blood to work normally. These problems lead to the symptoms of  heart failure.     Certain procedures may help the heart pump better in some cases of heart failure. Some procedures are done to treat health problems that may have caused the heart failure such as coronary artery disease or heart rhythm problems. For more serious heart failure, other options are available.  Treating artery and valve problems  If you have coronary artery disease or valve disease, procedures may be done to improve blood flow. This helps the heart pump better, which can improve heart failure symptoms. First, your doctor may do a cardiac catheterization to help detect clogged blood vessels or valve damage. During this procedure, a  thin tube (catheter) in inserted into a blood vessel and guided to the heart. There a dye is injected and a special type of X-ray (angiogram) is taken of the blood vessels. Procedures to open a blocked artery or fix damaged valves can also be done using catheterization.  · Angioplasty uses a balloon-tipped instrument at the end of the catheter. The balloon is inflated to widen the narrowed artery. In many cases, a stent is expanded to further support the narrowed artery. A stent is a metal mesh tube.  · Valve surgery repairs or replacement of faulty valves can also be done during catheterization so blood can flow properly through the chambers of the heart.  Bypass surgery is another option to help treat blocked arteries. It uses a healthy blood vessel from elsewhere in the body. The healthy blood vessel is attached above and below the blocked area so that blood can flow around the blocked artery.  Treating heart rhythm problems  A device may be placed in the chest to help a weak heart maintain a healthy, heartbeat so the heart can pump more effectively:  · Pacemaker. A pacemaker is an implanted device that regulates your heartbeat electronically. It monitors your heart's rhythm and generates a painless electric impulse that helps the heart beat in a regular rhythm. A  pacemaker is programmed to meet your specific heart rhythm needs.  · Biventricular pacing/cardiac resynchronization therapy. A type of pacemaker that paces both pumping chambers of the heart at the same time to coordinate contractions and to improve the heart's function. Some people with heart failure are candidates for this therapy.  · Implantable cardioverter defibrillator. A device similar to a pacemaker that senses when the heart is beating too fast and delivers an electrical shock to convert the fast rhythm to a normal rhythm. This can be a life saving device.  In severe cases  In more serious cases of heart failure when other treatments no longer work, other options may include:  · Ventricular assist devices (VADs). These are mechanical devices used to take over the pumping function for one or both of the heart's ventricles, or pumping chambers. A VAD may be necessary when heart failure progresses to the point that medicines and other treatments no longer help. In some cases, a VAD may be used as a bridge to transplant.  · Heart transplant. This is replacing the diseased heart with a healthy one from a donor. This is an option for a few people who are very sick. A heart transplant is very serious and not an option for all patients. Your doctor can tell you more.  Date Last Reviewed: 3/20/2016  © 0008-1916 [employer]. [address] [place] [address], [place] [id number]. All rights reserved. This information is not intended as a substitute for professional medical care. Always follow your healthcare professional's instructions.              Heart Failure: Tracking Your Weight  You have a condition called heart failure. When you have heart failure, a sudden weight gain or a steady rise in weight is a warning sign that your body is retaining too much water and salt. This could mean your heart failure is getting worse. If left untreated, it can cause problems for your lungs and result in shortness of breath.  Weighing yourself each day is the best way to know if youre retaining water. If your weight goes up quickly, call your doctor. You will be given instructions on how to get rid of the excess water. You will likely need medicines and to avoid salt. This will help your heart work better.  Call your doctor if you gain more than 2 pounds in 1 day, more than 5 pounds in 1 week, or whatever weight gain you were told to report by your doctor. This is often a sign of worsening heart failure and needs to be evaluated and treated. Your doctor will tell you what to do next.   Tips for weighing yourself    · Weigh yourself at the same time each morning, wearing the same clothes. Weigh yourself after urinating and before eating.  · Use the same scale each day. Make sure the numbers are easy to read. Put the scale on a flat, hard surface -- not on a rug or carpet.  · Do not stop weighing yourself. If you forget one day, weigh again the next morning.  How to use your weight chart  · Keep your weight chart near the scale. Write your weight on the chart as soon as you get off the scale.  · Fill in the month and the start date on the chart. Then write down your weight each day. Your chart will look like this:    · If you miss a day, leave the space blank. Weigh yourself the next day and write your weight in the next space.  · Take your weight chart with you when you go to see your doctor.  Date Last Reviewed: 3/20/2016  © 9487-1635 [employer]. [address] [place] [address], [place] [address]. All rights reserved. This information is not intended as a substitute for professional medical care. Always follow your healthcare professional's instructions.              Heart Failure: Warning Signs of a Flare-Up  You have a condition called heart failure. Once you have heart failure, flare-ups can happen. Below are signs that can mean your heart failure is getting worse. If you notice any of these warning signs, call your healthcare  provider.  Swelling    · Your feet, ankles, or lower legs get puffier.  · You notice skin changes on your lower legs.  · Your shoes feel too tight.  · Your clothes are tighter in the waist.  · You have trouble getting rings on or off your fingers.  Shortness of breath  · You have to breathe harder even when youre doing your normal activities or when youre resting.  · You are short of breath walking up stairs or even short distances.  · You wake up at night short of breath or coughing.  · You need to use more pillows or sit up to sleep.  · You wake up tired or restless.  Other warning signs  · You feel weaker, dizzy, or more tired.  · You have chest pain or changes in your heartbeat.  · You have a cough that wont go away.  · You cant remember things or dont feel like eating.  Tracking your weight  Gaining weight is often the first warning sign that heart failure is getting worse. Gaining even a few pounds can be a sign that your body is retaining excess water and salt. Weighing yourself each day in the morning after you urinate and before you eat, is the best way to know if you're retaining water. Get a scale that is easy to read and make sure you wear the same clothes and use the same scale every time you weigh. Your healthcare provider will show you how to track your weight. Call your doctor if you gain more than 2 pounds in 1 day, 5 pounds in 1 week, or whatever weight gain you were told to report by your doctor. This is often a sign of worsening heart failure and needs to be evaluated and treated before it compromises your breathing. Your doctor will tell you what to do next.    Date Last Reviewed: 3/15/2016  © 7421-8200 [employer]. [address] [place] [address], [place] [address]. All rights reserved. This information is not intended as a substitute for professional medical care. Always follow your healthcare professional's instructions.              Diabetes Discharge Instructions                                    Jan Information [facility] complies with applicable Federal civil rights laws and does not discriminate on the basis of race, color, national origin, age, disability, or sex.

## 2017-09-01 NOTE — DISCHARGE NOTE ADULT - PATIENT PORTAL LINK FT
“You can access the FollowHealth Patient Portal, offered by [facility], by registering with the following website: [email or web address]”

## 2017-09-01 NOTE — DISCHARGE NOTE ADULT - CARE PROVIDER_API CALL
Manolo Santana), Pediatrics Neurosurgery  [address] [place] [address]  Phone: [phone]  Fax: [phone]

## 2017-09-03 ENCOUNTER — TRANSCRIPTION ENCOUNTER (OUTPATIENT)
Age: 27
End: 2017-09-03

## 2017-09-27 ENCOUNTER — EMERGENCY (EMERGENCY)
Facility: HOSPITAL | Age: 27
LOS: 1 days | Discharge: HOME | End: 2017-09-27
Admitting: NEUROLOGICAL SURGERY
Payer: MEDICAID

## 2017-09-27 VITALS
DIASTOLIC BLOOD PRESSURE: 81 MMHG | TEMPERATURE: 98 F | HEART RATE: 89 BPM | OXYGEN SATURATION: 100 % | RESPIRATION RATE: 17 BRPM | SYSTOLIC BLOOD PRESSURE: 140 MMHG

## 2017-09-27 VITALS
OXYGEN SATURATION: 100 % | RESPIRATION RATE: 18 BRPM | HEART RATE: 90 BPM | DIASTOLIC BLOOD PRESSURE: 65 MMHG | SYSTOLIC BLOOD PRESSURE: 125 MMHG

## 2017-09-27 DIAGNOSIS — Z98.890 OTHER SPECIFIED POSTPROCEDURAL STATES: Chronic | ICD-10-CM

## 2017-09-27 DIAGNOSIS — Z98.2 PRESENCE OF CEREBROSPINAL FLUID DRAINAGE DEVICE: ICD-10-CM

## 2017-09-27 LAB
ALBUMIN SERPL ELPH-MCNC: 4.6 G/DL — SIGNIFICANT CHANGE UP (ref 3.3–5)
ALP SERPL-CCNC: 90 U/L — SIGNIFICANT CHANGE UP (ref 40–120)
ALT FLD-CCNC: 26 U/L — SIGNIFICANT CHANGE UP (ref 4–33)
AST SERPL-CCNC: 56 U/L — HIGH (ref 4–32)
BILIRUB SERPL-MCNC: 0.3 MG/DL — SIGNIFICANT CHANGE UP (ref 0.2–1.2)
BLD GP AB SCN SERPL QL: NEGATIVE — SIGNIFICANT CHANGE UP
BUN SERPL-MCNC: 16 MG/DL — SIGNIFICANT CHANGE UP (ref 7–23)
CALCIUM SERPL-MCNC: 8 MG/DL — LOW (ref 8.4–10.5)
CHLORIDE SERPL-SCNC: 104 MMOL/L — SIGNIFICANT CHANGE UP (ref 98–107)
CO2 SERPL-SCNC: 19 MMOL/L — LOW (ref 22–31)
CREAT SERPL-MCNC: 1.01 MG/DL — SIGNIFICANT CHANGE UP (ref 0.5–1.3)
GLUCOSE SERPL-MCNC: 91 MG/DL — SIGNIFICANT CHANGE UP (ref 70–99)
HCT VFR BLD CALC: 39 % — SIGNIFICANT CHANGE UP (ref 34.5–45)
HGB BLD-MCNC: 12.4 G/DL — SIGNIFICANT CHANGE UP (ref 11.5–15.5)
INR BLD: 0.97 — SIGNIFICANT CHANGE UP (ref 0.88–1.17)
MCHC RBC-ENTMCNC: 23.2 PG — LOW (ref 27–34)
MCHC RBC-ENTMCNC: 31.8 % — LOW (ref 32–36)
MCV RBC AUTO: 72.9 FL — LOW (ref 80–100)
NRBC # FLD: 0 — SIGNIFICANT CHANGE UP
PLATELET # BLD AUTO: 252 K/UL — SIGNIFICANT CHANGE UP (ref 150–400)
PMV BLD: SIGNIFICANT CHANGE UP FL (ref 7–13)
POTASSIUM SERPL-MCNC: SIGNIFICANT CHANGE UP MMOL/L (ref 3.5–5.3)
POTASSIUM SERPL-SCNC: SIGNIFICANT CHANGE UP MMOL/L (ref 3.5–5.3)
PROT SERPL-MCNC: 8.3 G/DL — SIGNIFICANT CHANGE UP (ref 6–8.3)
PROTHROM AB SERPL-ACNC: 10.9 SEC — SIGNIFICANT CHANGE UP (ref 9.8–13.1)
RBC # BLD: 5.35 M/UL — HIGH (ref 3.8–5.2)
RBC # FLD: SIGNIFICANT CHANGE UP % (ref 10.3–14.5)
REVIEW TO FOLLOW: YES — SIGNIFICANT CHANGE UP
RH IG SCN BLD-IMP: POSITIVE — SIGNIFICANT CHANGE UP
SODIUM SERPL-SCNC: 138 MMOL/L — SIGNIFICANT CHANGE UP (ref 135–145)
WBC # BLD: 11.07 K/UL — HIGH (ref 3.8–10.5)
WBC # FLD AUTO: 11.07 K/UL — HIGH (ref 3.8–10.5)

## 2017-09-27 PROCEDURE — 99285 EMERGENCY DEPT VISIT HI MDM: CPT

## 2017-09-27 RX ORDER — SODIUM CHLORIDE 9 MG/ML
1000 INJECTION INTRAMUSCULAR; INTRAVENOUS; SUBCUTANEOUS ONCE
Qty: 0 | Refills: 0 | Status: COMPLETED | OUTPATIENT
Start: 2017-09-27 | End: 2017-09-27

## 2017-09-27 RX ORDER — ONDANSETRON 8 MG/1
4 TABLET, FILM COATED ORAL ONCE
Qty: 0 | Refills: 0 | Status: COMPLETED | OUTPATIENT
Start: 2017-09-27 | End: 2017-09-27

## 2017-09-27 RX ORDER — ONDANSETRON 8 MG/1
4 TABLET, FILM COATED ORAL ONCE
Qty: 0 | Refills: 0 | Status: DISCONTINUED | OUTPATIENT
Start: 2017-09-27 | End: 2017-09-27

## 2017-09-27 RX ORDER — METOCLOPRAMIDE HCL 10 MG
10 TABLET ORAL ONCE
Qty: 0 | Refills: 0 | Status: COMPLETED | OUTPATIENT
Start: 2017-09-27 | End: 2017-09-27

## 2017-09-27 RX ORDER — ACETAMINOPHEN 500 MG
650 TABLET ORAL ONCE
Qty: 0 | Refills: 0 | Status: COMPLETED | OUTPATIENT
Start: 2017-09-27 | End: 2017-09-27

## 2017-09-27 RX ADMIN — ONDANSETRON 4 MILLIGRAM(S): 8 TABLET, FILM COATED ORAL at 19:21

## 2017-09-27 RX ADMIN — Medication 104 MILLIGRAM(S): at 18:20

## 2017-09-27 RX ADMIN — ONDANSETRON 4 MILLIGRAM(S): 8 TABLET, FILM COATED ORAL at 22:52

## 2017-09-27 RX ADMIN — Medication 650 MILLIGRAM(S): at 18:17

## 2017-09-27 RX ADMIN — SODIUM CHLORIDE 1000 MILLILITER(S): 9 INJECTION INTRAMUSCULAR; INTRAVENOUS; SUBCUTANEOUS at 18:20

## 2017-09-27 RX ADMIN — Medication 650 MILLIGRAM(S): at 19:19

## 2017-09-27 NOTE — ED PROVIDER NOTE - MEDICAL DECISION MAKING DETAILS
26F with hydrocephalus and recent  shunt revision presenting with symptoms typical of shunt malfunction (headache, nausea, numbness). concern for shunt malfunction. plan for ct head, xray shunt, cbc, cmp, pt/inr, neurosurgery consult. 26F with hydrocephalus and recent  shunt revision presenting with symptoms typical of shunt malfunction (headache, nausea, numbness). concern for shunt malfunction. plan for ct head, xray shunt, cbc, cmp, pt/inr, neurosurgery consult.    Cabot: 26F with PMH of congenital hydrocephalus s/p VPS revision several weeks ago, coming in with HA/N/V/body numbness.  No F/C.  No diarrhea/urinary sx/cough/SOB. On exam, HDS, NAD, AAOx3, PERRLA, chronic nystagmus, CNs intact, lungs CTAB, abd benign, LE wwp without edema.  Likely VPS malfunction.  Less likely ICH/CVA.  Will check CT head and VPS series and consult NS.

## 2017-09-27 NOTE — ED ADULT NURSE NOTE - OBJECTIVE STATEMENT
Pt rec'd in 21, accompanied by mother, s/p  shunt revision 3 weeks ago, c/o headache, nausea, and body numbness for the past few days. Pt states symptoms consistent with prior  shunt malfunctions. Labs obtained, medicated, off to CT scan.

## 2017-09-27 NOTE — ED PROVIDER NOTE - ATTENDING CONTRIBUTION TO CARE
I, Jennifer Cabot, MD, have performed a history and physical exam of the patient and discussed their management with the resident. I reviewed the resident's note and agree with the documented findings and plan of care. My medical decision making and observations are found above.    Cabot: 26F with PMH of congenital hydrocephalus s/p VPS revision several weeks ago, coming in with HA/N/V/body numbness.  No F/C.  No diarrhea/urinary sx/cough/SOB. On exam, HDS, NAD, AAOx3, PERRLA, chronic nystagmus, CNs intact, lungs CTAB, abd benign, LE wwp without edema.  Likely VPS malfunction.  Less likely ICH/CVA.  Will check CT head and VPS series and consult NS.

## 2017-09-27 NOTE — ED PROVIDER NOTE - PHYSICAL EXAMINATION
General: uncomfortable appearing female   HEENT: EOMI  Respiratory: normal work of breathing, lungs clear to auscultation bilaterally   Cardiac: regular rate and rhythm, no murmurs auscultated   Abdomen: soft, non-tender   Neuro: A&Ox3, bilateral numbness, 4/5 strength in all extremities, normal gait

## 2017-09-27 NOTE — ED ADULT TRIAGE NOTE - CHIEF COMPLAINT QUOTE
pt had  Shunt revision 3 weeks ago 2/2 hydrocephalics, pt c/o body aches, headache, nausea and vomiting. pt appears uncomfortable in triage.

## 2017-09-27 NOTE — ED PROVIDER NOTE - PROGRESS NOTE DETAILS
spoke with neurosurgery resident who needs to discuss case with attending. patient updated on CT results and awaiting neurosurgery plan. - resident Derek Saunders Spoke to neurosurgery PA. Patient will be admitted to neurosurgery under Dr. Nesbitt Patient requests to leave. Spoke to patient about risks of leaving the hospital. Patient continues to state she would not Patient requests to leave. Spoke to patient about risks of leaving the hospital. Patient understands the risks and continues to state she would like to not be admitted to the hospital and to go home. Cabot: I spoke with Dr. Nesbitt, who states that on the last OR exploration, they did not find any blockage in the VPS, and they do not plan to reexplore her unless her imaging becomes abnormal.  They would not want her admitted.  I spoke to the patient, and she does not want to stay if she is just going to be observed.  We will discharge her with NS follow up this week.

## 2017-09-28 DIAGNOSIS — Z98.2 PRESENCE OF CEREBROSPINAL FLUID DRAINAGE DEVICE: ICD-10-CM

## 2017-09-28 NOTE — CONSULT NOTE ADULT - SUBJECTIVE AND OBJECTIVE BOX
NEUROSURGERY    Patient is a 26y old  Female who presents with a chief complaint of headache, anusea and vomiting with generalized body numbness x 3 days    HPI:  6F, PMH of hydrocephalus with  shunt presenting with two days of increased headache, nausea and whole body numbness. Approximately one month ago the patient the patient had a shunt revision performed by Dr. Zuniga with relief of symptoms. One week later, however, the patient reports her symptoms began to recur, increasing in the past two days. Patient reports headache, full body numbness and nausea causing her to not eat for the past two days. Symptoms are typical for when her shunt malfunctions. Denies changes in vision, chest pain, shortness of breath, abdominal pain, fever or dysuria.  ICU Vital Signs Last 24 Hrs T(C): 37.2 (27 Sep 2017 17:23), Max: 37.2 (27 Sep 2017 17:23) T(F): 99 (27 Sep 2017 17:23), Max: 99 (27 Sep 2017 17:23) HR: 90 (27 Sep 2017 18:46) (89 - 93) BP: 125/65 (27 Sep 2017 18:46) (125/65 - 140/81) BP(mean): -- ABP: -- ABP(mean): -- RR: 18 (27 Sep 2017 18:46) (17 - 18) SpO2: 100% (27 Sep 2017 18:46) (98% - 100%)  Exam  Generally uncomfortable obese female Awake, alert, responsive conversant FC+ on all 4 ext, MCKENNA with good strength Able to ambulate without assitance Shunt pumps and fills easily All surgical wound healing well  Lab                     12.4  11.07 )-----------( 252      ( 27 Sep 2017 17:32 )            39.0   27 Sep 2017 17:32  138    |  104    |  16    ----------------------------<  91    x       |  19     |  1.01   Ca    8.0        27 Sep 2017 17:32  TPro  8.3    /  Alb  4.6    /  TBili  0.3    /  DBili  x      /  AST  56     /  ALT  26     /  AlkPhos  90     27 Sep 2017 17:32  LIVER FUNCTIONS - ( 27 Sep 2017 17:32 ) Alb: 4.6 g/dL / Pro: 8.3 g/dL / ALK PHOS: 90 u/L / ALT: 26 u/L / AST: 56 u/L / GGT: x         PT/INR - ( 27 Sep 2017 17:32 )   PT: 10.9 SEC;   INR: 0.97      CAPILLARY BLOOD GLUCOSE  Radiology  < from: CT Stereotactic Localization No Cont (09.27.17 @ 18:43) > This exam is compared with prior head CT performed on August 31, 2017.  Bilateral parietal and right suboccipital shunt catheters are again seen  and unchanged in position.  The size and configuration of the ventricles appear unchanged without  significant hydrocephalus seen.   There is no evidence of acute hemorrhage, mass mass effect in posterior  fossa supratentorial region  Evaluation of the osseous structureswith the appropriate window again  demonstrates postop changes compatible with bifrontal craniotomy.  The paranasal sinuses mastoid and middle ear regions appear clear.  Impression: No significant change when allowing for differences in   < end of copied text > < from: CT Stereotactic Localization No Cont (09.27.17 @ 18:43) > technique.  Shunt series : no kinks , breaks or discontinuity in tubing

## 2017-10-03 ENCOUNTER — APPOINTMENT (OUTPATIENT)
Dept: NEUROLOGY | Facility: CLINIC | Age: 27
End: 2017-10-03
Payer: MEDICAID

## 2017-10-03 VITALS
DIASTOLIC BLOOD PRESSURE: 80 MMHG | SYSTOLIC BLOOD PRESSURE: 130 MMHG | HEART RATE: 99 BPM | HEIGHT: 64 IN | WEIGHT: 172 LBS | BODY MASS INDEX: 29.37 KG/M2

## 2017-10-03 DIAGNOSIS — R51 HEADACHE: ICD-10-CM

## 2017-10-03 DIAGNOSIS — R20.2 PARESTHESIA OF SKIN: ICD-10-CM

## 2017-10-03 PROCEDURE — 99205 OFFICE O/P NEW HI 60 MIN: CPT

## 2017-10-03 PROCEDURE — 99215 OFFICE O/P EST HI 40 MIN: CPT

## 2017-10-26 ENCOUNTER — APPOINTMENT (OUTPATIENT)
Dept: PAIN MANAGEMENT | Facility: CLINIC | Age: 27
End: 2017-10-26

## 2017-11-10 NOTE — DISCHARGE NOTE ADULT - NURSING SECTION COMPLETE
Patient discharged to home with family.  Refused transport wheelchair.  AVS provided and reviewed.  Follow up appointments reviewed.  Heart failure action plan/ low sodium diet/ daily weights reviewed.  Metoprolol rx changed to [facility] on [address] by Meena Alfaro per patient request.  Written material on metoprolol provided and reviewed.  All questions answered.    Patient/Caregiver provided printed discharge information.

## 2018-01-01 ENCOUNTER — EMERGENCY (EMERGENCY)
Facility: HOSPITAL | Age: 28
LOS: 1 days | Discharge: HOME | End: 2018-01-01
Attending: EMERGENCY MEDICINE | Admitting: EMERGENCY MEDICINE
Payer: MEDICAID

## 2018-01-01 VITALS
DIASTOLIC BLOOD PRESSURE: 77 MMHG | OXYGEN SATURATION: 100 % | RESPIRATION RATE: 16 BRPM | HEART RATE: 89 BPM | SYSTOLIC BLOOD PRESSURE: 124 MMHG

## 2018-01-01 VITALS
DIASTOLIC BLOOD PRESSURE: 87 MMHG | HEART RATE: 97 BPM | SYSTOLIC BLOOD PRESSURE: 145 MMHG | RESPIRATION RATE: 16 BRPM | TEMPERATURE: 99 F | OXYGEN SATURATION: 100 %

## 2018-01-01 DIAGNOSIS — Z98.890 OTHER SPECIFIED POSTPROCEDURAL STATES: Chronic | ICD-10-CM

## 2018-01-01 DIAGNOSIS — G91.1 OBSTRUCTIVE HYDROCEPHALUS: ICD-10-CM

## 2018-01-01 LAB
ALBUMIN SERPL ELPH-MCNC: 4.4 G/DL — SIGNIFICANT CHANGE UP (ref 3.3–5)
ALP SERPL-CCNC: 98 U/L — SIGNIFICANT CHANGE UP (ref 40–120)
ALT FLD-CCNC: 23 U/L — SIGNIFICANT CHANGE UP (ref 4–33)
APTT BLD: 25.6 SEC — LOW (ref 27.5–37.4)
AST SERPL-CCNC: 46 U/L — HIGH (ref 4–32)
BASOPHILS # BLD AUTO: 0.01 K/UL — SIGNIFICANT CHANGE UP (ref 0–0.2)
BASOPHILS NFR BLD AUTO: 0.1 % — SIGNIFICANT CHANGE UP (ref 0–2)
BILIRUB SERPL-MCNC: 0.3 MG/DL — SIGNIFICANT CHANGE UP (ref 0.2–1.2)
BUN SERPL-MCNC: 11 MG/DL — SIGNIFICANT CHANGE UP (ref 7–23)
CALCIUM SERPL-MCNC: 8.4 MG/DL — SIGNIFICANT CHANGE UP (ref 8.4–10.5)
CHLORIDE SERPL-SCNC: 106 MMOL/L — SIGNIFICANT CHANGE UP (ref 98–107)
CO2 SERPL-SCNC: 19 MMOL/L — LOW (ref 22–31)
CREAT SERPL-MCNC: 1.01 MG/DL — SIGNIFICANT CHANGE UP (ref 0.5–1.3)
EOSINOPHIL # BLD AUTO: 0.01 K/UL — SIGNIFICANT CHANGE UP (ref 0–0.5)
EOSINOPHIL NFR BLD AUTO: 0.1 % — SIGNIFICANT CHANGE UP (ref 0–6)
GLUCOSE SERPL-MCNC: 101 MG/DL — HIGH (ref 70–99)
HCT VFR BLD CALC: 39.9 % — SIGNIFICANT CHANGE UP (ref 34.5–45)
HGB BLD-MCNC: 12.4 G/DL — SIGNIFICANT CHANGE UP (ref 11.5–15.5)
IMM GRANULOCYTES # BLD AUTO: 0.01 # — SIGNIFICANT CHANGE UP
IMM GRANULOCYTES NFR BLD AUTO: 0.1 % — SIGNIFICANT CHANGE UP (ref 0–1.5)
INR BLD: 1 — SIGNIFICANT CHANGE UP (ref 0.88–1.17)
LYMPHOCYTES # BLD AUTO: 2.48 K/UL — SIGNIFICANT CHANGE UP (ref 1–3.3)
LYMPHOCYTES # BLD AUTO: 29.3 % — SIGNIFICANT CHANGE UP (ref 13–44)
MCHC RBC-ENTMCNC: 22.8 PG — LOW (ref 27–34)
MCHC RBC-ENTMCNC: 31.1 % — LOW (ref 32–36)
MCV RBC AUTO: 73.2 FL — LOW (ref 80–100)
MONOCYTES # BLD AUTO: 0.45 K/UL — SIGNIFICANT CHANGE UP (ref 0–0.9)
MONOCYTES NFR BLD AUTO: 5.3 % — SIGNIFICANT CHANGE UP (ref 2–14)
NEUTROPHILS # BLD AUTO: 5.51 K/UL — SIGNIFICANT CHANGE UP (ref 1.8–7.4)
NEUTROPHILS NFR BLD AUTO: 65.1 % — SIGNIFICANT CHANGE UP (ref 43–77)
NRBC # FLD: 0 — SIGNIFICANT CHANGE UP
PLATELET # BLD AUTO: 344 K/UL — SIGNIFICANT CHANGE UP (ref 150–400)
PMV BLD: 10.2 FL — SIGNIFICANT CHANGE UP (ref 7–13)
POTASSIUM SERPL-MCNC: SIGNIFICANT CHANGE UP MMOL/L (ref 3.5–5.3)
POTASSIUM SERPL-SCNC: SIGNIFICANT CHANGE UP MMOL/L (ref 3.5–5.3)
PROT SERPL-MCNC: 7.9 G/DL — SIGNIFICANT CHANGE UP (ref 6–8.3)
PROTHROM AB SERPL-ACNC: 11.5 SEC — SIGNIFICANT CHANGE UP (ref 9.8–13.1)
RBC # BLD: 5.45 M/UL — HIGH (ref 3.8–5.2)
RBC # FLD: 15.3 % — HIGH (ref 10.3–14.5)
SODIUM SERPL-SCNC: 137 MMOL/L — SIGNIFICANT CHANGE UP (ref 135–145)
WBC # BLD: 8.47 K/UL — SIGNIFICANT CHANGE UP (ref 3.8–10.5)
WBC # FLD AUTO: 8.47 K/UL — SIGNIFICANT CHANGE UP (ref 3.8–10.5)

## 2018-01-01 PROCEDURE — 99285 EMERGENCY DEPT VISIT HI MDM: CPT

## 2018-01-01 PROCEDURE — 71046 X-RAY EXAM CHEST 2 VIEWS: CPT | Mod: 26

## 2018-01-01 PROCEDURE — 77011 CT SCAN FOR LOCALIZATION: CPT | Mod: 26

## 2018-01-01 PROCEDURE — 70250 X-RAY EXAM OF SKULL: CPT | Mod: 26

## 2018-01-01 PROCEDURE — 74019 RADEX ABDOMEN 2 VIEWS: CPT | Mod: 26

## 2018-01-01 RX ORDER — ONDANSETRON 8 MG/1
4 TABLET, FILM COATED ORAL ONCE
Qty: 0 | Refills: 0 | Status: COMPLETED | OUTPATIENT
Start: 2018-01-01 | End: 2018-01-01

## 2018-01-01 RX ORDER — KETOROLAC TROMETHAMINE 30 MG/ML
30 SYRINGE (ML) INJECTION ONCE
Qty: 0 | Refills: 0 | Status: DISCONTINUED | OUTPATIENT
Start: 2018-01-01 | End: 2018-01-01

## 2018-01-01 RX ORDER — DIPHENHYDRAMINE HCL 50 MG
25 CAPSULE ORAL ONCE
Qty: 0 | Refills: 0 | Status: COMPLETED | OUTPATIENT
Start: 2018-01-01 | End: 2018-01-01

## 2018-01-01 RX ORDER — METOCLOPRAMIDE HCL 10 MG
10 TABLET ORAL ONCE
Qty: 0 | Refills: 0 | Status: COMPLETED | OUTPATIENT
Start: 2018-01-01 | End: 2018-01-01

## 2018-01-01 RX ADMIN — Medication 10 MILLIGRAM(S): at 18:20

## 2018-01-01 RX ADMIN — Medication 25 MILLIGRAM(S): at 18:20

## 2018-01-01 RX ADMIN — Medication 30 MILLIGRAM(S): at 18:20

## 2018-01-01 RX ADMIN — ONDANSETRON 4 MILLIGRAM(S): 8 TABLET, FILM COATED ORAL at 16:01

## 2018-01-01 NOTE — ED ADULT NURSE NOTE - OBJECTIVE STATEMENT
pt arrives to room #9 alert and oriented. ambulatory. pt states that she has hx of hydrocephalus with a  shunt. pt last had surgery for revision in august of 2017. pt states since then she has been having pain in the back of her head, on the right side. pt states pain is usually 10/10 but with medical marijuana she has had some relief at times. pt also states she feels numbness on her entire right side. pt states she has had low appetite and nausea over the past few months also only some relief with medical marijuana. pt states nothing else works at all for pain.

## 2018-01-01 NOTE — ED PROVIDER NOTE - CARE PLAN
Principal Discharge DX:	Headache  Instructions for follow-up, activity and diet:	Rest, stay hydrated, take all meds as previously prescribed, Followup with your PMD and neurosurgeon within 2 days for post hospital visit. Show your doctor and specialists all copies of labs given to you. Return for worsening symptoms, ex. fever, shortness of breath, chest pain, dizziness, palpitations, etc. Please read all the patient handouts.  If no PMD, can call [facility] EM/IM clinic or [facility] Medicine clinic [phone] in [facility] for appointment.

## 2018-01-01 NOTE — CONSULT NOTE ADULT - ASSESSMENT
27 year old F known to neurosurgery with multiple shunt revision and cranial vault expansion with acute on chronic headaches

## 2018-01-01 NOTE — CONSULT NOTE ADULT - SUBJECTIVE AND OBJECTIVE BOX
[facility] Ophthalmology Consult Note    HPI: 27 y.o F PMH of hydrocephalus s/p  shunt with multiple revisions in past presenting with acute on chronic headaches for 2-3 weeks. Denies association of pain with positioning. Denies visual complaints.       PMH: hydrocephalus   POcHx: Amblyopia OS, strabismus surgery, nystagmus   Meds: no ggts   Allergies: NKDA      Mood and Affect Appropriate ( x ),  Oriented to Time, Place, and Person x 3 ( x )    Ophthalmology Exam    Visual acuity (sc): 20/25-2 OD 20/40 OS PHNI  Pupils: PERRL OU, no APD  Ttono: 16 OU  Extraocular movements (EOMs): Full OU, no pain, no diplopia   Confrontational Visual Field (CVF):  Full OU    Slit Lamp Exam (SLE):  External:  Flat LXT <30D  Lids/Lashes/Lacrimal Ducts: WNL OU  Sclera/ Conj: W+Q OU  Cornea: Cl OU  Anterior Chamber: D+F OU  Iris:  Flat OU  Lens:  Cl OU    Fundus Exam: dilated with 1% tropicamide and 2.5% phenylephrine  Approval obtained from primary team for dilation  Patient aware that pupils can remained dilated for at least 4-6 hours  Exam performed with 20D lens    Full Dilated exam pending   Vitreous: wnl OU  Disc, cup/disc: 0.3 OU  Macula:  wnl OU  Vessels:  wnl OU  Periphery: wnl OU    A/P. Pending full dilated exam   Assess for optic nerve edema. Does not rule out increased intracranial pressure.     Follow-Up:  Patient should follow up his/her ophthalmologist or in the [facility] Ophthalmology Practice within 1 week of discharge, sooner if symptoms worsen or change.  Neuro-Ophthalmology   [address] [place] [address].  [place] [address] [phone] [facility] Ophthalmology Consult Note    HPI: 27 y.o F PMH of hydrocephalus s/p  shunt with multiple revisions in past presenting with acute on chronic headaches for 2-3 weeks. Denies association of pain with positioning. Denies visual complaints.       PMH: hydrocephalus   POcHx: Amblyopia OS, strabismus surgery, nystagmus   Meds: no ggts   Allergies: NKDA      Mood and Affect Appropriate ( x ),  Oriented to Time, Place, and Person x 3 ( x )    Ophthalmology Exam    Visual acuity (sc): 20/25-2 OD 20/40 OS PHNI  Pupils: PERRL OU, no APD  Ttono: 16 OU  Extraocular movements (EOMs): Full OU, no pain, no diplopia   Confrontational Visual Field (CVF):  Full OU    Slit Lamp Exam (SLE):  External:  Flat LXT <30D  Lids/Lashes/Lacrimal Ducts: WNL OU  Sclera/ Conj: W+Q OU  Cornea: Cl OU  Anterior Chamber: D+F OU  Iris:  Flat OU  Lens:  Cl OU    Fundus Exam: dilated with 1% tropicamide and 2.5% phenylephrine  Approval obtained from primary team for dilation  Patient aware that pupils can remained dilated for at least 4-6 hours  Exam performed with 20D lens    Vitreous: wnl OU  Disc, cup/disc: Sharp, pink 0.2 OU  Macula:  wnl OU  Vessels:  wnl OU  Periphery: wnl OU    A/P. 27 y.o F h.o hydrocephalus s/p  shunt. DFE with no signs of optic nerve edema. Does not rule out increased intracranial pressure.   - Cont management as per primary team/ Neurosurgery     Follow-Up:  Patient should follow up his/her ophthalmologist or in the [facility] Ophthalmology Practice within 1 week of discharge, sooner if symptoms worsen or change.  Neuro-Ophthalmology   [address] [place] [address].  [place] [address] [phone]

## 2018-01-01 NOTE — ED PROVIDER NOTE - PLAN OF CARE
Rest, stay hydrated, take all meds as previously prescribed, Followup with your PMD and neurosurgeon within 2 days for post hospital visit. Show your doctor and specialists all copies of labs given to you. Return for worsening symptoms, ex. fever, shortness of breath, chest pain, dizziness, palpitations, etc. Please read all the patient handouts.  If no PMD, can call [facility] EM/IM clinic or [facility] Medicine clinic [phone] in [facility] for appointment.

## 2018-01-01 NOTE — ED PROVIDER NOTE - ATTENDING CONTRIBUTION TO CARE
27F h/o hydrocephalus s/p  shunt presents from home with mother for increased head pressure at  shunt site. Patient denies vision changes, focal weakness/numbness, neck pain, fever, cough, chest pain, shortness of breath, back pain, abd pain, nausea, vomiting, diarrhea, constipation, blood in stool, dysuria, rash, trauma, falls. Patient is well appearing, conversant, cooperative, smiling, head atraumatic, neck supple with full range of motion, oropharynx clear, lungs clear, no crackles or rales, speaking full sentences, heart clear, no murmurs, distal pulses equal in all 4 extremities, abdomen soft nontender nondistended with no masses, no leg edema, no calf tenderness, nonfocal neurologic exam. CT head, shuntogram, neurosurgery consult. Patient does not want pain medications at this time. Reassess.

## 2018-01-01 NOTE — ED PROVIDER NOTE - PROGRESS NOTE DETAILS
neurosx evaluated pt, ct and shuntogram appears unremarkable, recommending migraine cocktail (reglan/toradol/benadryl) and optho c/s to r/o papilledema. If no papilledema, pt is to be dc'd with neurosx f/u. pt cleared by otho, stable for dc

## 2018-01-01 NOTE — ED PROVIDER NOTE - OBJECTIVE STATEMENT
28 y/o F PMH hydrocephalus s/p  shunt with multiple revisions in past, last Aug '17 c/o worsening headaches and pressure over site of shunt x 2-3 weeks. States since her last revision she has had numbness/tingling to right side of body, worsening nausea, decreased appetite and worsening headaches. Pt has followed [facility] pain management and neuro for these sxs. Currently taking nabumetone, topiramate, and naratriptan for sxs as well as medical marijuana for appetite stimulant/nausea control. Notes when she develops pressure over shunt site she typically needs to undergo revision procedure. States she has felt warm/feverish today but has not spiked a temperature. Denies chills, bodyaches, CP, SOB, cough, abdominal pain, vomiting, diarrhea, weakness, visual changes, dizziness.

## 2018-01-01 NOTE — CONSULT NOTE ADULT - ATTENDING COMMENTS
No papilledema per ophtho  CT with slit ventricles  Strata valve at 2.0  Headache improving with fluid bolus and "migraine cocktail"  No evidence of VPS malfunction  Pt started on medical marijuana with good effect in past.  F/u in office.  If headaches remain persistent, can consider Teofilo monitor.  D/W mother and pt at bedside.

## 2018-01-01 NOTE — ED PROVIDER NOTE - CHPI ED SYMPTOMS NEG
no dizziness/no fever/no blurred vision/no weakness/no vomiting/no change in level of consciousness/no confusion

## 2018-01-01 NOTE — ED ADULT TRIAGE NOTE - CHIEF COMPLAINT QUOTE
Pt. c/o headache and severe pressure where  shunt is placed x 4 months.. h/o hydrocephalus with revision in Aug 2017. Also c/o right sided numbness. Denies any fevers.

## 2018-01-01 NOTE — CONSULT NOTE ADULT - SUBJECTIVE AND OBJECTIVE BOX
26 y/o F PMH hydrocephalus s/p  shunt with multiple revisions in past, last Aug '17 c/o worsening headaches and pressure over site of shunt x 2-3 weeks. States since her last revision she has had numbness/tingling to right side of body, worsening nausea, decreased appetite and worsening headaches. Pt has followed [facility] pain management and neuro for these sxs. Currently taking nabumetone, topiramate, and naratriptan for sxs as well as medical marijuana for appetite stimulant/nausea control. Notes when she develops pressure over shunt site she typically needs to undergo revision procedure. States she has felt warm/feverish today but has not spiked a temperature. Denies chills, bodyaches, CP, SOB, cough, abdominal pain, vomiting, diarrhea, weakness, visual changes, dizziness.    Patient has not seen Dr. Mcgee/Solitario/Dr. Nesbitt in the office     HPI:    PAST MEDICAL & SURGICAL HISTORY:  Craniosynostosis  Meniere disease: Pt  discontinued   triamterene/HCTZ,  stopped March. Pt states MD aware  Obstructive hydrocephalus:  shunt placed at birth with multiple revisions for malfunction. LAST REVISION 02/2015  Strabismus  Personal History of Hydrocephalus (ICD9 V12.49)  S/P craniotomy: cranial vault expansion  Ventriculo-Peritoneal Shunt Status (ICD9 V45.2):  shunt placed at birth with multiple revisions 1990 2002 x2 2003 2004 2007 2009 2011 2015 .    Allergies    No Known Allergies    Intolerances      diphenhydrAMINE   Injectable 25 milliGRAM(s) IV Push Once  ketorolac   Injectable 30 milliGRAM(s) IV Push Once  metoclopramide Injectable 10 milliGRAM(s) IV Push once    SOCIAL HISTORY:  FAMILY HISTORY:  No pertinent family history    Vital Signs Last 24 Hrs  T(C): 37.3 (01 Jan 2018 14:29), Max: 37.3 (01 Jan 2018 14:29)  T(F): 99.2 (01 Jan 2018 14:29), Max: 99.2 (01 Jan 2018 14:29)  HR: 97 (01 Jan 2018 14:29) (97 - 97)  BP: 145/87 (01 Jan 2018 14:29) (145/87 - 145/87)  BP(mean): --  RR: 16 (01 Jan 2018 14:29) (16 - 16)  SpO2: 100% (01 Jan 2018 14:29) (100% - 100%)    PHYSICAL EXAM:  Awake Alert Attentive  EOMI, PERRL  Motor 5/5  Sensory intact  FC  Shunt pumping and refilling well set to 2.0    LABS:                          12.4   8.47  )-----------( 344      ( 01 Jan 2018 15:40 )             39.9     01-01    137  |  106  |  11  ----------------------------<  101<H>  Test not performed SPECIMEN GROSSLY HEMOLYZED   |  19<L>  |  1.01    Ca    8.4      01 Jan 2018 15:40    TPro  7.9  /  Alb  4.4  /  TBili  0.3  /  DBili  x   /  AST  46<H>  /  ALT  23  /  AlkPhos  98  01-01    PT/INR - ( 01 Jan 2018 15:40 )   PT: 11.5 SEC;   INR: 1.00          PTT - ( 01 Jan 2018 15:40 )  PTT:25.6 SEC      RADIOLOGY & ADDITIONAL STUDIES:    Assessment/Plan:    ~~~~~~~~~~~~~~~~~~~~~~~~~~~~~~ 26 y/o F PMH hydrocephalus s/p  shunt with multiple revisions in past, last Aug '17 c/o worsening headaches and pressure over site of shunt x 2-3 weeks. States since her last revision she has had numbness/tingling to right side of body, worsening nausea, decreased appetite and worsening headaches. Pt has followed [facility] pain management and neuro for these sxs. Currently taking nabumetone, topiramate, and naratriptan for sxs as well as medical marijuana for appetite stimulant/nausea control. Notes when she develops pressure over shunt site she typically needs to undergo revision procedure. States she has felt warm/feverish today but has not spiked a temperature. Denies chills, bodyaches, CP, SOB, cough, abdominal pain, vomiting, diarrhea, weakness, visual changes, dizziness.    Patient has not seen Dr. Mcgee/Solitario/Dr. Nesbitt in the office     HPI:    PAST MEDICAL & SURGICAL HISTORY:  Craniosynostosis  Meniere disease: Pt  discontinued   triamterene/HCTZ,  stopped March. Pt states MD aware  Obstructive hydrocephalus:  shunt placed at birth with multiple revisions for malfunction. LAST REVISION 02/2015  Strabismus  Personal History of Hydrocephalus (ICD9 V12.49)  S/P craniotomy: cranial vault expansion  Ventriculo-Peritoneal Shunt Status (ICD9 V45.2):  shunt placed at birth with multiple revisions 1990 2002 x2 2003 2004 2007 2009 2011 2015 .    Allergies    No Known Allergies    Intolerances      diphenhydrAMINE   Injectable 25 milliGRAM(s) IV Push Once  ketorolac   Injectable 30 milliGRAM(s) IV Push Once  metoclopramide Injectable 10 milliGRAM(s) IV Push once    SOCIAL HISTORY:  FAMILY HISTORY:  No pertinent family history    Vital Signs Last 24 Hrs  T(C): 37.3 (01 Jan 2018 14:29), Max: 37.3 (01 Jan 2018 14:29)  T(F): 99.2 (01 Jan 2018 14:29), Max: 99.2 (01 Jan 2018 14:29)  HR: 97 (01 Jan 2018 14:29) (97 - 97)  BP: 145/87 (01 Jan 2018 14:29) (145/87 - 145/87)  BP(mean): --  RR: 16 (01 Jan 2018 14:29) (16 - 16)  SpO2: 100% (01 Jan 2018 14:29) (100% - 100%)    PHYSICAL EXAM:  Awake Alert Attentive  EOMI, PERRL  Motor 5/5  Sensory intact  FC  Shunt pumping and refilling well set to 2.0    LABS:                          12.4   8.47  )-----------( 344      ( 01 Jan 2018 15:40 )             39.9     01-01    137  |  106  |  11  ----------------------------<  101<H>  Test not performed SPECIMEN GROSSLY HEMOLYZED   |  19<L>  |  1.01    Ca    8.4      01 Jan 2018 15:40    TPro  7.9  /  Alb  4.4  /  TBili  0.3  /  DBili  x   /  AST  46<H>  /  ALT  23  /  AlkPhos  98  01-01    PT/INR - ( 01 Jan 2018 15:40 )   PT: 11.5 SEC;   INR: 1.00          PTT - ( 01 Jan 2018 15:40 )  PTT:25.6 SEC      RADIOLOGY & ADDITIONAL STUDIES:  < from: Xray Shuntogram  Shunt (01.01.18 @ 16:51) >  ost surgical changes involving the skull are noted as on the   prior study. The  shunt is seen as on the prior study terminating   within the left upper abdomen appears unchanged without evidence of   discontinuity orkink. IUD is noted. The heart size may be enlarged. The   lungs are clear.    < end of copied text >  Stereo CT Head reviewed. ventricles very small and not dialated. Appear similar to prior study.       Assessment/Plan:    ~~~~~~~~~~~~~~~~~~~~~~~~~~~~~~

## 2018-02-26 ENCOUNTER — OUTPATIENT (OUTPATIENT)
Dept: OUTPATIENT SERVICES | Facility: HOSPITAL | Age: 28
LOS: 1 days | End: 2018-02-26
Payer: MEDICAID

## 2018-02-26 VITALS
HEART RATE: 88 BPM | WEIGHT: 156.09 LBS | DIASTOLIC BLOOD PRESSURE: 74 MMHG | RESPIRATION RATE: 16 BRPM | HEIGHT: 63.75 IN | TEMPERATURE: 98 F | SYSTOLIC BLOOD PRESSURE: 112 MMHG

## 2018-02-26 DIAGNOSIS — Z98.890 OTHER SPECIFIED POSTPROCEDURAL STATES: Chronic | ICD-10-CM

## 2018-02-26 DIAGNOSIS — R51 HEADACHE: ICD-10-CM

## 2018-02-26 DIAGNOSIS — G91.9 HYDROCEPHALUS, UNSPECIFIED: ICD-10-CM

## 2018-02-26 LAB
BLD GP AB SCN SERPL QL: NEGATIVE — SIGNIFICANT CHANGE UP
BUN SERPL-MCNC: 22 MG/DL — SIGNIFICANT CHANGE UP (ref 7–23)
CALCIUM SERPL-MCNC: 9.1 MG/DL — SIGNIFICANT CHANGE UP (ref 8.4–10.5)
CHLORIDE SERPL-SCNC: 106 MMOL/L — SIGNIFICANT CHANGE UP (ref 98–107)
CO2 SERPL-SCNC: 20 MMOL/L — LOW (ref 22–31)
CREAT SERPL-MCNC: 0.97 MG/DL — SIGNIFICANT CHANGE UP (ref 0.5–1.3)
GLUCOSE SERPL-MCNC: 84 MG/DL — SIGNIFICANT CHANGE UP (ref 70–99)
HCT VFR BLD CALC: 39.5 % — SIGNIFICANT CHANGE UP (ref 34.5–45)
HGB BLD-MCNC: 12.6 G/DL — SIGNIFICANT CHANGE UP (ref 11.5–15.5)
MCHC RBC-ENTMCNC: 23.5 PG — LOW (ref 27–34)
MCHC RBC-ENTMCNC: 31.9 % — LOW (ref 32–36)
MCV RBC AUTO: 73.7 FL — LOW (ref 80–100)
NRBC # FLD: 0 — SIGNIFICANT CHANGE UP
PLATELET # BLD AUTO: 364 K/UL — SIGNIFICANT CHANGE UP (ref 150–400)
PMV BLD: 10.8 FL — SIGNIFICANT CHANGE UP (ref 7–13)
POTASSIUM SERPL-MCNC: 3.8 MMOL/L — SIGNIFICANT CHANGE UP (ref 3.5–5.3)
POTASSIUM SERPL-SCNC: 3.8 MMOL/L — SIGNIFICANT CHANGE UP (ref 3.5–5.3)
RBC # BLD: 5.36 M/UL — HIGH (ref 3.8–5.2)
RBC # FLD: 14.6 % — HIGH (ref 10.3–14.5)
RH IG SCN BLD-IMP: POSITIVE — SIGNIFICANT CHANGE UP
SODIUM SERPL-SCNC: 141 MMOL/L — SIGNIFICANT CHANGE UP (ref 135–145)
WBC # BLD: 7.95 K/UL — SIGNIFICANT CHANGE UP (ref 3.8–10.5)
WBC # FLD AUTO: 7.95 K/UL — SIGNIFICANT CHANGE UP (ref 3.8–10.5)

## 2018-02-26 PROCEDURE — 93010 ELECTROCARDIOGRAM REPORT: CPT

## 2018-02-26 RX ORDER — SODIUM CHLORIDE 9 MG/ML
1000 INJECTION, SOLUTION INTRAVENOUS
Qty: 0 | Refills: 0 | Status: DISCONTINUED | OUTPATIENT
Start: 2018-03-01 | End: 2018-03-02

## 2018-02-26 RX ORDER — SODIUM CHLORIDE 9 MG/ML
3 INJECTION INTRAMUSCULAR; INTRAVENOUS; SUBCUTANEOUS EVERY 8 HOURS
Qty: 0 | Refills: 0 | Status: DISCONTINUED | OUTPATIENT
Start: 2018-03-01 | End: 2018-03-03

## 2018-02-26 NOTE — H&P PST ADULT - HISTORY OF PRESENT ILLNESS
28yo female with medical h/o Hydrocephalus h/o  shunt placement, with additional revision, including Cranial Expansion 5/2015. Pt c/o severe headaches and numbness to face and extremities, right side - denies loss of conscious/altered mental status. Pt presents today for presurgical testing for Insertion of Intracranial Pressure Monitor scheduled for 3/01/2018. 28yo female with medical h/o Hydrocephalus with  shunt placement, with additional revision, including Cranial Expansion 5/2015. Pt c/o severe headaches, vertigo and numbness to face and extremities, right side - denies loss of conscious/altered mental status. Pt presents today for presurgical testing for Insertion of Intracranial Pressure Monitor scheduled for 3/01/2018.

## 2018-02-26 NOTE — H&P PST ADULT - NSANTHOSAYNRD_GEN_A_CORE
No. SHIRLEY screening performed.  STOP BANG Legend: 0-2 = LOW Risk; 3-4 = INTERMEDIATE Risk; 5-8 = HIGH Risk

## 2018-02-26 NOTE — H&P PST ADULT - NEGATIVE PSYCHIATRIC SYMPTOMS
no depression/no anxiety/no agitation no agitation/no depression/no anxiety/no paranoia/no mood swings

## 2018-02-26 NOTE — H&P PST ADULT - MUSCULOSKELETAL
negative detailed exam no joint warmth/no calf tenderness/no joint swelling/ROM intact/no joint erythema/normal strength

## 2018-02-26 NOTE — H&P PST ADULT - NEUROLOGICAL COMMENTS
reports h/o hydrocephalus dx in utero, had  shunt with multiple revisions. Pt c/o severe headaches and numbness and tingling to face and extremities, Neurologist aware

## 2018-02-26 NOTE — H&P PST ADULT - PROBLEM SELECTOR PLAN 1
Insertion of Intracranial Pressure Monitor scheduled for 3/01/2018.  Pre-op instructions given. Pt verbalized understanding  Pepcid given for GI prophylaxis   ABO ordered STAT for day of procedure  UCG ordered STAT for day of procedure - urine container   Pending: Neurology & Medical clearance

## 2018-03-01 ENCOUNTER — TRANSCRIPTION ENCOUNTER (OUTPATIENT)
Age: 28
End: 2018-03-01

## 2018-03-01 ENCOUNTER — INPATIENT (INPATIENT)
Facility: HOSPITAL | Age: 28
LOS: 1 days | Discharge: HOME | End: 2018-03-03
Attending: NEUROLOGICAL SURGERY | Admitting: NEUROLOGICAL SURGERY
Payer: MEDICAID

## 2018-03-01 VITALS
SYSTOLIC BLOOD PRESSURE: 129 MMHG | OXYGEN SATURATION: 100 % | TEMPERATURE: 99 F | WEIGHT: 156.09 LBS | HEIGHT: 63.75 IN | RESPIRATION RATE: 16 BRPM | HEART RATE: 91 BPM | DIASTOLIC BLOOD PRESSURE: 77 MMHG

## 2018-03-01 DIAGNOSIS — G91.9 HYDROCEPHALUS, UNSPECIFIED: ICD-10-CM

## 2018-03-01 DIAGNOSIS — Z98.890 OTHER SPECIFIED POSTPROCEDURAL STATES: Chronic | ICD-10-CM

## 2018-03-01 LAB
ALBUMIN SERPL ELPH-MCNC: 3.8 G/DL — SIGNIFICANT CHANGE UP (ref 3.3–5)
ALP SERPL-CCNC: 96 U/L — SIGNIFICANT CHANGE UP (ref 40–120)
ALT FLD-CCNC: 12 U/L — SIGNIFICANT CHANGE UP (ref 4–33)
AST SERPL-CCNC: 10 U/L — SIGNIFICANT CHANGE UP (ref 4–32)
BILIRUB SERPL-MCNC: 0.2 MG/DL — SIGNIFICANT CHANGE UP (ref 0.2–1.2)
BUN SERPL-MCNC: 13 MG/DL — SIGNIFICANT CHANGE UP (ref 7–23)
CALCIUM SERPL-MCNC: 8.7 MG/DL — SIGNIFICANT CHANGE UP (ref 8.4–10.5)
CHLORIDE SERPL-SCNC: 104 MMOL/L — SIGNIFICANT CHANGE UP (ref 98–107)
CO2 SERPL-SCNC: 22 MMOL/L — SIGNIFICANT CHANGE UP (ref 22–31)
CREAT SERPL-MCNC: 0.85 MG/DL — SIGNIFICANT CHANGE UP (ref 0.5–1.3)
GLUCOSE SERPL-MCNC: 178 MG/DL — HIGH (ref 70–99)
HCG UR QL: NEGATIVE — SIGNIFICANT CHANGE UP
POTASSIUM SERPL-MCNC: 3.8 MMOL/L — SIGNIFICANT CHANGE UP (ref 3.5–5.3)
POTASSIUM SERPL-SCNC: 3.8 MMOL/L — SIGNIFICANT CHANGE UP (ref 3.5–5.3)
PROT SERPL-MCNC: 6.9 G/DL — SIGNIFICANT CHANGE UP (ref 6–8.3)
SODIUM SERPL-SCNC: 139 MMOL/L — SIGNIFICANT CHANGE UP (ref 135–145)

## 2018-03-01 PROCEDURE — 99223 1ST HOSP IP/OBS HIGH 75: CPT | Mod: 25

## 2018-03-01 RX ORDER — HYDROMORPHONE HYDROCHLORIDE 2 MG/ML
0.5 INJECTION INTRAMUSCULAR; INTRAVENOUS; SUBCUTANEOUS EVERY 4 HOURS
Qty: 0 | Refills: 0 | Status: DISCONTINUED | OUTPATIENT
Start: 2018-03-01 | End: 2018-03-03

## 2018-03-01 RX ORDER — ACETAMINOPHEN 500 MG
650 TABLET ORAL EVERY 6 HOURS
Qty: 0 | Refills: 0 | Status: DISCONTINUED | OUTPATIENT
Start: 2018-03-01 | End: 2018-03-03

## 2018-03-01 RX ORDER — INFLUENZA VIRUS VACCINE 15; 15; 15; 15 UG/.5ML; UG/.5ML; UG/.5ML; UG/.5ML
0.5 SUSPENSION INTRAMUSCULAR ONCE
Qty: 0 | Refills: 0 | Status: COMPLETED | OUTPATIENT
Start: 2018-03-01 | End: 2018-03-01

## 2018-03-01 RX ORDER — TOPIRAMATE 25 MG
75 TABLET ORAL DAILY
Qty: 0 | Refills: 0 | Status: DISCONTINUED | OUTPATIENT
Start: 2018-03-01 | End: 2018-03-03

## 2018-03-01 RX ORDER — HYDROMORPHONE HYDROCHLORIDE 2 MG/ML
0.25 INJECTION INTRAMUSCULAR; INTRAVENOUS; SUBCUTANEOUS
Qty: 0 | Refills: 0 | Status: DISCONTINUED | OUTPATIENT
Start: 2018-03-01 | End: 2018-03-01

## 2018-03-01 RX ORDER — SULINDAC 200 MG/1
200 TABLET ORAL
Qty: 0 | Refills: 0 | Status: DISCONTINUED | OUTPATIENT
Start: 2018-03-01 | End: 2018-03-03

## 2018-03-01 RX ORDER — ONDANSETRON 8 MG/1
4 TABLET, FILM COATED ORAL ONCE
Qty: 0 | Refills: 0 | Status: DISCONTINUED | OUTPATIENT
Start: 2018-03-01 | End: 2018-03-01

## 2018-03-01 RX ORDER — ACETAMINOPHEN 500 MG
1000 TABLET ORAL ONCE
Qty: 0 | Refills: 0 | Status: COMPLETED | OUTPATIENT
Start: 2018-03-01 | End: 2018-03-01

## 2018-03-01 RX ADMIN — Medication 400 MILLIGRAM(S): at 11:50

## 2018-03-01 RX ADMIN — SODIUM CHLORIDE 30 MILLILITER(S): 9 INJECTION, SOLUTION INTRAVENOUS at 20:00

## 2018-03-01 RX ADMIN — SODIUM CHLORIDE 3 MILLILITER(S): 9 INJECTION INTRAMUSCULAR; INTRAVENOUS; SUBCUTANEOUS at 11:51

## 2018-03-01 RX ADMIN — SODIUM CHLORIDE 30 MILLILITER(S): 9 INJECTION, SOLUTION INTRAVENOUS at 09:20

## 2018-03-01 RX ADMIN — Medication 1000 MILLIGRAM(S): at 12:15

## 2018-03-01 RX ADMIN — SODIUM CHLORIDE 3 MILLILITER(S): 9 INJECTION INTRAMUSCULAR; INTRAVENOUS; SUBCUTANEOUS at 21:19

## 2018-03-01 RX ADMIN — SODIUM CHLORIDE 30 MILLILITER(S): 9 INJECTION, SOLUTION INTRAVENOUS at 11:50

## 2018-03-01 NOTE — PROGRESS NOTE ADULT - SUBJECTIVE AND OBJECTIVE BOX
Neurosurgery Resident Note    post op check    Clinical Course: 27yof w/ VPS s/p ICP monitor. Neurologically stable. ICP single digits when lying flat.    VS: T(C): 36.2 (03-01-18 @ 16:00)  HR: 53 (03-01-18 @ 18:00)  BP: 112/62 (03-01-18 @ 18:00)  RR: 17 (03-01-18 @ 18:00)  SpO2: 100% (03-01-18 @ 18:00)  Wt(kg): --    Exam:   AAOx3  PERRL, EOMI, face symmetric, no tongue deviation  5/5 throughout, no pronator drift  Sensation intact to light touch throughout  Reflexes 2+ throughout  No dysmetria  Medications:       03-01    139  |  104  |  13  ----------------------------<  178<H>  3.8   |  22  |  0.85    Ca    8.7      01 Mar 2018 16:39    TPro  6.9  /  Alb  3.8  /  TBili  0.2  /  DBili  x   /  AST  10  /  ALT  12  /  AlkPhos  96  03-01

## 2018-03-01 NOTE — CONSULT NOTE ADULT - SUBJECTIVE AND OBJECTIVE BOX
HISTORY OF PRESENT ILLNESS:  28yo female with h/o hydrocephalus, with  shunt s/p multiple revisions, presents for ICP monitor placement today. Patient has been having severe headaches, worse with laying flat, a/w numbness of R face, upper extremity and lower extremity. No changes in vision/AMS. Baseline walks with cane, no recent changes in ambulation though does endorse weakness in RUE and RLE.       PAST MEDICAL HISTORY: Craniosynostosis  Meniere disease  Obstructive hydrocephalus  Strabismus  Personal History of Hydrocephalus (ICD9 V12.49)      PAST SURGICAL HISTORY: S/P craniotomy  Ventriculo-Peritoneal Shunt Status (ICD9 V45.2)      FAMILY HISTORY: No pertinent family history      SOCIAL HISTORY:    CODE STATUS:     HOME MEDICATIONS:    ALLERGIES: No Known Allergies      VITAL SIGNS:  ICU Vital Signs Last 24 Hrs  T(C): 36.6 (01 Mar 2018 11:00), Max: 37 (01 Mar 2018 06:50)  T(F): 97.9 (01 Mar 2018 11:00), Max: 98.6 (01 Mar 2018 06:50)  HR: 57 (01 Mar 2018 11:00) (55 - 91)  BP: 104/68 (01 Mar 2018 11:00) (104/68 - 129/77)  BP(mean): --  ABP: --  ABP(mean): --  RR: 12 (01 Mar 2018 11:00) (11 - 17)  SpO2: 99% (01 Mar 2018 11:00) (98% - 100%)      NEURO  Exam:  Meds:HYDROmorphone  Injectable 0.25 milliGRAM(s) IV Push every 10 minutes PRN Severe Pain (7 - 10)  ondansetron Injectable 4 milliGRAM(s) IV Push once PRN Nausea and/or Vomiting  sulindac 200 milliGRAM(s) Oral two times a day PRN headaches  topiramate 75 milliGRAM(s) Oral daily      RESPIRATORY  Mechanical Ventilation:     Exam:  Meds:    CARDIOVASCULAR    Exam:  Cardiac Rhythm:  Meds:    GI/NUTRITION  Exam:  Diet:  Meds:    GENITOURINARY/RENAL  Meds:lactated ringers. 1000 milliLiter(s) IV Continuous <Continuous>      03-01 @ 07:01  -  03-01 @ 12:00  --------------------------------------------------------  IN:    lactated ringers.: 75 mL    Oral Fluid: 120 mL  Total IN: 195 mL    OUT:    Voided: 500 mL  Total OUT: 500 mL    Total NET: -305 mL        Weight (kg): 70.8 (03-01 @ 06:50)        [ ] Becerril catheter, indication: urine output monitoring in critically ill patient    HEMATOLOGIC  [ ] VTE Prophylaxis:        Transfusion: [ ] PRBC	[ ] Platelets	[ ] FFP	[ ] Cryoprecipitate      INFECTIOUS DISEASES  Meds:  RECENT CULTURES:      ENDOCRINE  Meds:  CAPILLARY BLOOD GLUCOSE          PATIENT CARE ACCESS DEVICES:  [ ] Peripheral IV  [ ] Central Venous Line	[ ] R	[ ] L	[ ] IJ	[ ] Fem	[ ] SC	Placed:   [ ] Arterial Line		[ ] R	[ ] L	[ ] Fem	[ ] Rad	[ ] Ax	Placed:   [ ] PICC:					[ ] Mediport  [ ] Urinary Catheter, Date Placed:   [x] Necessity of urinary, arterial, and venous catheters discussed    OTHER MEDICATIONS: sodium chloride 0.9% lock flush 3 milliLiter(s) IV Push every 8 hours      IMAGING STUDIES: HISTORY OF PRESENT ILLNESS:  28yo female with h/o hydrocephalus, with  shunt s/p multiple revisions, presents for ICP monitor placement today. Patient has been having severe headaches, worse with laying flat, a/w numbness of R face, upper extremity and lower extremity. No changes in vision/AMS. Baseline walks with cane, no recent changes in ambulation though does endorse weakness in RUE and RLE.       PAST MEDICAL HISTORY: Craniosynostosis  Meniere disease  Obstructive hydrocephalus  Strabismus  Personal History of Hydrocephalus (ICD9 V12.49)      PAST SURGICAL HISTORY: S/P craniotomy  Ventriculo-Peritoneal Shunt Status (ICD9 V45.2)      FAMILY HISTORY: No pertinent family history      SOCIAL HISTORY:    CODE STATUS:     HOME MEDICATIONS:  · 	topiramate 25 mg oral capsule: 3 cap(s) orally once a day (at bedtime)  · 	naratriptan 2.5 mg oral tablet: 1 tab(s) orally 2 times a day, As Needed as per patient  · 	nabumetone 750 mg oral tablet: 1 tab(s) orally 2 times a day as per patient as needed  · 	marijuana vape: 1 dose(s) inhaled , As Needed  · 	ibuprofen 800 mg oral tablet: 1 tab(s) orally 3 times a day last dose on 2/26/18  · 	Advil PM 38 mg-200 mg oral tablet: 1 tab(s) orally once a day (at bedtime) last dose on 2/26/18  · 	turmeric 500 mg oral capsule: 2 cap(s) orally once a day last dose on 2/26/18  · 	Zquil: 1 dose(s) orally , As Needed    ALLERGIES: No Known Allergies      VITAL SIGNS:  ICU Vital Signs Last 24 Hrs  T(C): 36.6 (01 Mar 2018 11:00), Max: 37 (01 Mar 2018 06:50)  T(F): 97.9 (01 Mar 2018 11:00), Max: 98.6 (01 Mar 2018 06:50)  HR: 57 (01 Mar 2018 11:00) (55 - 91)  BP: 104/68 (01 Mar 2018 11:00) (104/68 - 129/77)  BP(mean): --  ABP: --  ABP(mean): --  RR: 12 (01 Mar 2018 11:00) (11 - 17)  SpO2: 99% (01 Mar 2018 11:00) (98% - 100%)      NEURO  Exam: AOx3, mildly diminished sensation of RUE and RLE, 4/5 strenght RUE and RLE, 5/5 on left, cranial nerves intact, PERRLA  Meds:HYDROmorphone  Injectable 0.25 milliGRAM(s) IV Push every 10 minutes PRN Severe Pain (7 - 10)  ondansetron Injectable 4 milliGRAM(s) IV Push once PRN Nausea and/or Vomiting  sulindac 200 milliGRAM(s) Oral two times a day PRN headaches  topiramate 75 milliGRAM(s) Oral daily      RESPIRATORY  Exam: CTAB      CARDIOVASCULAR    Exam: RRR, no m/r/g      GI/NUTRITION  Exam: abd soft, non tender, non distended  Diet: regular diet       GENITOURINARY/RENAL  Meds:lactated ringers. 1000 milliLiter(s) IV Continuous <Continuous>      03-01 @ 07:01  -  03-01 @ 12:00  --------------------------------------------------------  IN:    lactated ringers.: 75 mL    Oral Fluid: 120 mL  Total IN: 195 mL    OUT:    Voided: 500 mL  Total OUT: 500 mL    Total NET: -305 mL        Weight (kg): 70.8 (03-01 @ 06:50)        [ ] Becerril catheter, indication: urine output monitoring in critically ill patient    HEMATOLOGIC  [ ] VTE Prophylaxis: SCD        Transfusion: [ ] PRBC	[ ] Platelets	[ ] FFP	[ ] Cryoprecipitate      INFECTIOUS DISEASES  Afebdrile      ENDOCRINE  No active issues  CAPILLARY BLOOD GLUCOSE          PATIENT CARE ACCESS DEVICES:  [ x] Peripheral IV  [ ] Central Venous Line	[ ] R	[ ] L	[ ] IJ	[ ] Fem	[ ] SC	Placed:   [ ] Arterial Line		[ ] R	[ ] L	[ ] Fem	[ ] Rad	[ ] Ax	Placed:   [ ] PICC:					[ ] Mediport  [ ] Urinary Catheter, Date Placed:   [x] Necessity of urinary, arterial, and venous catheters discussed  [ x] other - ICP monitor R frontal  OTHER MEDICATIONS: sodium chloride 0.9% lock flush 3 milliLiter(s) IV Push every 8 hours      IMAGING STUDIES:

## 2018-03-01 NOTE — CONSULT NOTE ADULT - ATTENDING COMMENTS
27 F with h/o hydrocephalus and  shunt, with headaches and R sided numbness, presents for ICP monitoring, SICU consulted for observation  Neuro: patient s/p ICP monitor R frontal, AOX3, with mild sensory deficits on the R. C/w ICP monitor and q1 hr neuro checks. C/w home topiramate.   CV: stable vital signs. C/w monitoring  Resp: Patient on room air with no issues. continue to oberve  GI: regular diet  : no issues  ID: no active issues  ppx: SCD   c care dx; sp ICP placement,  shunt malfunction.    The patient is a critical care patient with hemodynamic and metabolic instability in SICU.  I have personally interviewed and examined this patient, reviewed labs and x-rays, discussed with other consultants, House staff and PA's.  I spent  66   minutes  in total providing critical care for the diagnoses, assessment and plan above.  These diagnoses are unrelated to the surgical procedure noted above.  I met with family     min to get further history and make care decisions for this patient who is unable to participate due to altered mental status.  Time involved in performance of separately billable procedures was not counted toward my critical care time.  There is no overlap.

## 2018-03-01 NOTE — CONSULT NOTE ADULT - ASSESSMENT
27 F with h/o hydrocephalus and  shunt, with headaches and R sided numbness, presents for ICP monitoring, SICU consulted for observation  Neuro: patient s/p ICP monitor R frontal, AOX3, with mild sensory deficits on the R. C/w ICP monitor and q1 hr neuro checks. C/w home topiramate.   CV: stable vital signs. C/w monitoring  Resp: Patient on room air with no issues. continue to oberve  GI: regular diet  : no issues  ID: no active issues  ppx: SCD

## 2018-03-02 DIAGNOSIS — Z98.2 PRESENCE OF CEREBROSPINAL FLUID DRAINAGE DEVICE: ICD-10-CM

## 2018-03-02 LAB
HCT VFR BLD CALC: 38.7 % — SIGNIFICANT CHANGE UP (ref 34.5–45)
HGB BLD-MCNC: 12 G/DL — SIGNIFICANT CHANGE UP (ref 11.5–15.5)
MAGNESIUM SERPL-MCNC: 2 MG/DL — SIGNIFICANT CHANGE UP (ref 1.6–2.6)
MCHC RBC-ENTMCNC: 22.6 PG — LOW (ref 27–34)
MCHC RBC-ENTMCNC: 31 % — LOW (ref 32–36)
MCV RBC AUTO: 72.9 FL — LOW (ref 80–100)
NRBC # FLD: 0 — SIGNIFICANT CHANGE UP
PHOSPHATE SERPL-MCNC: 3.1 MG/DL — SIGNIFICANT CHANGE UP (ref 2.5–4.5)
PLATELET # BLD AUTO: 365 K/UL — SIGNIFICANT CHANGE UP (ref 150–400)
PMV BLD: 9.9 FL — SIGNIFICANT CHANGE UP (ref 7–13)
RBC # BLD: 5.31 M/UL — HIGH (ref 3.8–5.2)
RBC # FLD: 14.3 % — SIGNIFICANT CHANGE UP (ref 10.3–14.5)
WBC # BLD: 12.6 K/UL — HIGH (ref 3.8–10.5)
WBC # FLD AUTO: 12.6 K/UL — HIGH (ref 3.8–10.5)

## 2018-03-02 PROCEDURE — 99233 SBSQ HOSP IP/OBS HIGH 50: CPT

## 2018-03-02 RX ORDER — ONDANSETRON 8 MG/1
4 TABLET, FILM COATED ORAL ONCE
Qty: 0 | Refills: 0 | Status: COMPLETED | OUTPATIENT
Start: 2018-03-02 | End: 2018-03-02

## 2018-03-02 RX ORDER — SODIUM CHLORIDE 9 MG/ML
500 INJECTION INTRAMUSCULAR; INTRAVENOUS; SUBCUTANEOUS ONCE
Qty: 0 | Refills: 0 | Status: COMPLETED | OUTPATIENT
Start: 2018-03-02 | End: 2018-03-02

## 2018-03-02 RX ORDER — SODIUM CHLORIDE 9 MG/ML
3 INJECTION INTRAMUSCULAR; INTRAVENOUS; SUBCUTANEOUS EVERY 8 HOURS
Qty: 0 | Refills: 0 | Status: DISCONTINUED | OUTPATIENT
Start: 2018-03-02 | End: 2018-03-03

## 2018-03-02 RX ORDER — ACETAMINOPHEN 500 MG
1000 TABLET ORAL ONCE
Qty: 0 | Refills: 0 | Status: COMPLETED | OUTPATIENT
Start: 2018-03-02 | End: 2018-03-02

## 2018-03-02 RX ORDER — POLYETHYLENE GLYCOL 3350 17 G/17G
17 POWDER, FOR SOLUTION ORAL ONCE
Qty: 0 | Refills: 0 | Status: COMPLETED | OUTPATIENT
Start: 2018-03-02 | End: 2018-03-02

## 2018-03-02 RX ADMIN — SODIUM CHLORIDE 30 MILLILITER(S): 9 INJECTION, SOLUTION INTRAVENOUS at 08:42

## 2018-03-02 RX ADMIN — SODIUM CHLORIDE 3 MILLILITER(S): 9 INJECTION INTRAMUSCULAR; INTRAVENOUS; SUBCUTANEOUS at 05:04

## 2018-03-02 RX ADMIN — SODIUM CHLORIDE 1500 MILLILITER(S): 9 INJECTION INTRAMUSCULAR; INTRAVENOUS; SUBCUTANEOUS at 22:00

## 2018-03-02 RX ADMIN — ONDANSETRON 4 MILLIGRAM(S): 8 TABLET, FILM COATED ORAL at 08:44

## 2018-03-02 RX ADMIN — SODIUM CHLORIDE 3 MILLILITER(S): 9 INJECTION INTRAMUSCULAR; INTRAVENOUS; SUBCUTANEOUS at 23:03

## 2018-03-02 RX ADMIN — Medication 1000 MILLIGRAM(S): at 22:15

## 2018-03-02 RX ADMIN — ONDANSETRON 4 MILLIGRAM(S): 8 TABLET, FILM COATED ORAL at 12:06

## 2018-03-02 RX ADMIN — Medication 400 MILLIGRAM(S): at 22:00

## 2018-03-02 RX ADMIN — SODIUM CHLORIDE 3 MILLILITER(S): 9 INJECTION INTRAMUSCULAR; INTRAVENOUS; SUBCUTANEOUS at 14:21

## 2018-03-02 RX ADMIN — POLYETHYLENE GLYCOL 3350 17 GRAM(S): 17 POWDER, FOR SOLUTION ORAL at 12:01

## 2018-03-02 NOTE — PROGRESS NOTE ADULT - ASSESSMENT
27 F with h/o hydrocephalus and  shunt, with headaches and R sided numbness, presents for ICP monitoring, SICU consulted for observation  Neuro: patient s/p ICP monitor R frontal, AOX3, with mild sensory deficits on the R. C/w ICP monitor and q4 hr neuro checks. C/w home topiramate. Encourage ambulation  CV: stable vital signs. C/w monitoring  Resp: Patient on room air with no issues. continue to observe  GI: regular diet  : no issues  ID: no active issues ASSESSMENT: 27 F with h/o hydrocephalus and  shunt, with headaches and R sided numbness, presents for ICP monitoring, SICU consulted for observation    PLAN:   Neuro: patient s/p ICP monitor R frontal, AOX3, with mild sensory deficits on the R. C/w ICP monitor and q4 hr neuro checks. C/w home topiramate, tylenol for HA.  Encourage ambulation  CV: stable vital signs. C/w monitoring.  LR@30cc/hr.  Resp: Patient on room air with no issues. continue to observe  GI: regular diet  : no issues  ID: no active issues  Dispo: SICU    Critical Care Diagnosis: close neuro monitoring ASSESSMENT: 27 F with h/o hydrocephalus and  shunt, with headaches and R sided numbness, presents for ICP monitoring, SICU consulted for observation    PLAN:   Neuro: patient s/p ICP monitor R frontal, AOX3, with mild sensory deficits on the R. C/w ICP monitor and q4 hr neuro checks. C/w home topiramate, tylenol for HA.  Neuro to come reassess patient.  CV: stable vital signs. C/w monitoring.  SLIV.  Resp: Patient on room air with no issues. continue to observe  GI: regular diet  : no issues  ID: no active issues  Dispo: SICU    Critical Care Diagnosis: close neuro monitoring

## 2018-03-02 NOTE — PROGRESS NOTE ADULT - SUBJECTIVE AND OBJECTIVE BOX
HISTORY  27y Female    24 HOUR EVENTS: Patient with R frontal ICP monitor in place. C/o intermittent headache, however, patient does not want pain medication. Tolerating dier.     SUBJECTIVE/ROS:  [ ] A ten-point review of systems was otherwise negative except as noted.  [ ] Due to altered mental status/intubation, subjective information were not able to be obtained from the patient. History was obtained, to the extent possible, from review of the chart and collateral sources of information.      NEURO  AOx3  Exam: R sensation diminished compared to L, 4/5 strenght RUE and RLE, 5/5 on left. Cranial nerves intact. PERRLA.   Meds: acetaminophen   Tablet. 650 milliGRAM(s) Oral every 6 hours PRN Mild Pain (1 - 3)  HYDROmorphone  Injectable 0.5 milliGRAM(s) IV Push every 4 hours PRN Severe Pain (7 - 10)  sulindac 200 milliGRAM(s) Oral two times a day PRN headaches  topiramate 75 milliGRAM(s) Oral daily      RESPIRATORY  RR: 14 (03-02-18 @ 01:00) (11 - 27)  SpO2: 100% (03-02-18 @ 01:00) (98% - 100%)  Wt(kg): --  Exam: unlabored, clear to auscultation bilaterally      CARDIOVASCULAR  HR: 71 (03-02-18 @ 01:00) (53 - 96)  BP: 129/80 (03-02-18 @ 01:00) (95/59 - 131/74)  BP(mean): 92 (03-02-18 @ 01:00) (67 - 93)  ABP: --  ABP(mean): --  Wt(kg): --  CVP(cm H2O): --      Exam: RRR, no murmurs  Cardiac Rhythm:  Perfusion     [x ]Adequate   [ ]Inadequate  Mentation   [x ]Normal       [ ]Reduced  Extremities  [ x]Warm         [ ]Cool  Volume Status [ ]Hypervolemic [x ]Euvolemic [ ]Hypovolemic  Meds:       GI/NUTRITION  Exam: Abd soft, non tender, non distended  Diet: regular diet   Meds:     GENITOURINARY  I&O's Detail    03-01 @ 07:01  -  03-02 @ 02:27  --------------------------------------------------------  IN:    lactated ringers.: 465 mL    Oral Fluid: 220 mL  Total IN: 685 mL    OUT:    Voided: 1150 mL  Total OUT: 1150 mL    Total NET: -465 mL        Weight (kg): 70.8 (03-01 @ 06:50)  03-01    139  |  104  |  13  ----------------------------<  178<H>  3.8   |  22  |  0.85    Ca    8.7      01 Mar 2018 16:39    TPro  6.9  /  Alb  3.8  /  TBili  0.2  /  DBili  x   /  AST  10  /  ALT  12  /  AlkPhos  96  03-01    [ ] Becerril catheter, indication: N/A  Meds: lactated ringers. 1000 milliLiter(s) IV Continuous <Continuous>          Transfusion     [ ] PRBC   [ ] Platelets   [ ] FFP   [ ] Cryoprecipitate      INFECTIOUS DISEASES  T(C): 36.7 (03-02-18 @ 00:00), Max: 37 (03-01-18 @ 06:50)  Wt(kg): --    Recent Cultures: none    Meds: none      ENDOCRINE  Capillary Blood Glucose    Meds: none      ACCESS DEVICES:  [x ] Peripheral IV  [ ] Central Venous Line	[ ] R	[ ] L	[ ] IJ	[ ] Fem	[ ] SC	Placed:   [ ] Arterial Line		[ ] R	[ ] L	[ ] Fem	[ ] Rad	[ ] Ax	Placed:   [ ] PICC:					[ ] Mediport  [ ] Urinary Catheter, Date Placed:   [ ] Necessity of urinary, arterial, and venous catheters discussed    OTHER MEDICATIONS:  sodium chloride 0.9% lock flush 3 milliLiter(s) IV Push every 8 hours      CODE STATUS:     IMAGING:      LABS: HISTORY  27y Female    24 HOUR EVENTS: Patient with R frontal ICP monitor in place. C/o intermittent headache, however, patient does not want pain medication. Tolerating diet.    SUBJECTIVE/ROS:  [ ] A ten-point review of systems was otherwise negative except as noted.  [ ] Due to altered mental status/intubation, subjective information were not able to be obtained from the patient. History was obtained, to the extent possible, from review of the chart and collateral sources of information.      NEURO  AOx3  Exam: R sensation diminished compared to L, 4/5 strenght RUE and RLE, 5/5 on left. Cranial nerves intact. PERRLA.   Meds: acetaminophen   Tablet. 650 milliGRAM(s) Oral every 6 hours PRN Mild Pain (1 - 3)  HYDROmorphone  Injectable 0.5 milliGRAM(s) IV Push every 4 hours PRN Severe Pain (7 - 10)  sulindac 200 milliGRAM(s) Oral two times a day PRN headaches  topiramate 75 milliGRAM(s) Oral daily      RESPIRATORY  RR: 14 (03-02-18 @ 01:00) (11 - 27)  SpO2: 100% (03-02-18 @ 01:00) (98% - 100%)  Wt(kg): --  Exam: unlabored, clear to auscultation bilaterally      CARDIOVASCULAR  HR: 71 (03-02-18 @ 01:00) (53 - 96)  BP: 129/80 (03-02-18 @ 01:00) (95/59 - 131/74)  BP(mean): 92 (03-02-18 @ 01:00) (67 - 93)  ABP: --  ABP(mean): --  Wt(kg): --  CVP(cm H2O): --      Exam: RRR, no murmurs  Cardiac Rhythm:  Perfusion     [x ]Adequate   [ ]Inadequate  Mentation   [x ]Normal       [ ]Reduced  Extremities  [ x]Warm         [ ]Cool  Volume Status [ ]Hypervolemic [x ]Euvolemic [ ]Hypovolemic  Meds:       GI/NUTRITION  Exam: Abd soft, non tender, non distended  Diet: regular diet   Meds:     GENITOURINARY  I&O's Detail    03-01 @ 07:01  -  03-02 @ 02:27  --------------------------------------------------------  IN:    lactated ringers.: 465 mL    Oral Fluid: 220 mL  Total IN: 685 mL    OUT:    Voided: 1150 mL  Total OUT: 1150 mL    Total NET: -465 mL        Weight (kg): 70.8 (03-01 @ 06:50)  03-01    139  |  104  |  13  ----------------------------<  178<H>  3.8   |  22  |  0.85    Ca    8.7      01 Mar 2018 16:39    TPro  6.9  /  Alb  3.8  /  TBili  0.2  /  DBili  x   /  AST  10  /  ALT  12  /  AlkPhos  96  03-01    [ ] Becerril catheter, indication: N/A  Meds: lactated ringers. 1000 milliLiter(s) IV Continuous <Continuous>          Transfusion     [ ] PRBC   [ ] Platelets   [ ] FFP   [ ] Cryoprecipitate      INFECTIOUS DISEASES  T(C): 36.7 (03-02-18 @ 00:00), Max: 37 (03-01-18 @ 06:50)  Wt(kg): --    Recent Cultures: none    Meds: none      ENDOCRINE  Capillary Blood Glucose    Meds: none      ACCESS DEVICES:  [x ] Peripheral IV  [ ] Central Venous Line	[ ] R	[ ] L	[ ] IJ	[ ] Fem	[ ] SC	Placed:   [ ] Arterial Line		[ ] R	[ ] L	[ ] Fem	[ ] Rad	[ ] Ax	Placed:   [ ] PICC:					[ ] Mediport  [ ] Urinary Catheter, Date Placed:   [ ] Necessity of urinary, arterial, and venous catheters discussed    OTHER MEDICATIONS:  sodium chloride 0.9% lock flush 3 milliLiter(s) IV Push every 8 hours      CODE STATUS:     IMAGING:      LABS:

## 2018-03-02 NOTE — PROGRESS NOTE ADULT - SUBJECTIVE AND OBJECTIVE BOX
C/O intermittent headaches  ICU Vital Signs Last 24 Hrs  T(C): 36.7 (02 Mar 2018 00:00), Max: 37 (01 Mar 2018 06:50)  T(F): 98 (02 Mar 2018 00:00), Max: 98.6 (01 Mar 2018 06:50)  HR: 71 (02 Mar 2018 01:00) (53 - 96)  BP: 129/80 (02 Mar 2018 01:00) (95/59 - 131/74)  BP(mean): 92 (02 Mar 2018 01:00) (67 - 93)  ABP: --  ABP(mean): --  RR: 14 (02 Mar 2018 01:00) (11 - 27)  SpO2: 100% (02 Mar 2018 01:00) (98% - 100%)  ICP (-)5 to 9, lower when sitting upright    AAO X 3  PERRLA, EOMI  MCKENNA strength 5/5    MEDICATIONS  (STANDING):  lactated ringers. 1000 milliLiter(s) (30 mL/Hr) IV Continuous <Continuous>  sodium chloride 0.9% lock flush 3 milliLiter(s) IV Push every 8 hours  topiramate 75 milliGRAM(s) Oral daily

## 2018-03-02 NOTE — PROGRESS NOTE ADULT - ATTENDING COMMENTS
I have personally interviewed and examined this patient, reviewed pertinent labs and imaging, and discussed the case with colleagues, residents, physician assistants, and nurses on SICU rounds.       35  minutes in total were spent in providing direct critical care for the diagnoses, assessment and plan outlined below.  These diagnoses are unrelated to the surgical procedure.  Additionally, time spent in the performance of separately billable procedures was not counted toward the critical care time.  There is no overlap.    Diagnoses  T85.09XA Obstructed ventriculoperitoneal shunt, initial encounter   R11.0 Nausea   G44.89 Other headache syndrome   E66.3 Overweight (BMI 25.0-29.9)   H81.09 Meniere's disease, unspecified laterality   Q75.0 Craniosynostoses     Plan  neuro: fu neurosurgery - fu pt neuro exam, headache,  shunt adjusted this am. continue topiramate, sulindac. tylenol and dilaudid prn pain  pulm: no active issue  cards: no active issue  GI: regular diet as tolerated, zofran for nausea prn  : no active issue, Na 139  heme: no active issue  ID: afebrile, will monitor WBC, no active issue  proph: SCDS only in setting of recent ICP monitor placement

## 2018-03-03 ENCOUNTER — TRANSCRIPTION ENCOUNTER (OUTPATIENT)
Age: 28
End: 2018-03-03

## 2018-03-03 VITALS
HEART RATE: 65 BPM | RESPIRATION RATE: 14 BRPM | DIASTOLIC BLOOD PRESSURE: 79 MMHG | OXYGEN SATURATION: 100 % | SYSTOLIC BLOOD PRESSURE: 118 MMHG

## 2018-03-03 DIAGNOSIS — R51 HEADACHE: ICD-10-CM

## 2018-03-03 LAB
BUN SERPL-MCNC: 13 MG/DL — SIGNIFICANT CHANGE UP (ref 7–23)
CA-I BLD-SCNC: 1.07 MMOL/L — SIGNIFICANT CHANGE UP (ref 1.03–1.23)
CALCIUM SERPL-MCNC: 8.1 MG/DL — LOW (ref 8.4–10.5)
CHLORIDE SERPL-SCNC: 106 MMOL/L — SIGNIFICANT CHANGE UP (ref 98–107)
CO2 SERPL-SCNC: 23 MMOL/L — SIGNIFICANT CHANGE UP (ref 22–31)
CREAT SERPL-MCNC: 0.78 MG/DL — SIGNIFICANT CHANGE UP (ref 0.5–1.3)
GLUCOSE SERPL-MCNC: 84 MG/DL — SIGNIFICANT CHANGE UP (ref 70–99)
HCT VFR BLD CALC: 35.3 % — SIGNIFICANT CHANGE UP (ref 34.5–45)
HGB BLD-MCNC: 11.1 G/DL — LOW (ref 11.5–15.5)
MAGNESIUM SERPL-MCNC: 2.1 MG/DL — SIGNIFICANT CHANGE UP (ref 1.6–2.6)
MCHC RBC-ENTMCNC: 22.8 PG — LOW (ref 27–34)
MCHC RBC-ENTMCNC: 31.4 % — LOW (ref 32–36)
MCV RBC AUTO: 72.5 FL — LOW (ref 80–100)
NRBC # FLD: 0 — SIGNIFICANT CHANGE UP
PHOSPHATE SERPL-MCNC: 4 MG/DL — SIGNIFICANT CHANGE UP (ref 2.5–4.5)
PLATELET # BLD AUTO: 312 K/UL — SIGNIFICANT CHANGE UP (ref 150–400)
PMV BLD: 10.3 FL — SIGNIFICANT CHANGE UP (ref 7–13)
POTASSIUM SERPL-MCNC: 3.5 MMOL/L — SIGNIFICANT CHANGE UP (ref 3.5–5.3)
POTASSIUM SERPL-SCNC: 3.5 MMOL/L — SIGNIFICANT CHANGE UP (ref 3.5–5.3)
RBC # BLD: 4.87 M/UL — SIGNIFICANT CHANGE UP (ref 3.8–5.2)
RBC # FLD: 14.4 % — SIGNIFICANT CHANGE UP (ref 10.3–14.5)
SODIUM SERPL-SCNC: 141 MMOL/L — SIGNIFICANT CHANGE UP (ref 135–145)
WBC # BLD: 7.1 K/UL — SIGNIFICANT CHANGE UP (ref 3.8–10.5)
WBC # FLD AUTO: 7.1 K/UL — SIGNIFICANT CHANGE UP (ref 3.8–10.5)

## 2018-03-03 PROCEDURE — 99231 SBSQ HOSP IP/OBS SF/LOW 25: CPT

## 2018-03-03 RX ORDER — POTASSIUM CHLORIDE 20 MEQ
40 PACKET (EA) ORAL ONCE
Qty: 0 | Refills: 0 | Status: COMPLETED | OUTPATIENT
Start: 2018-03-03 | End: 2018-03-03

## 2018-03-03 RX ORDER — ONDANSETRON 8 MG/1
4 TABLET, FILM COATED ORAL ONCE
Qty: 0 | Refills: 0 | Status: COMPLETED | OUTPATIENT
Start: 2018-03-03 | End: 2018-03-03

## 2018-03-03 RX ORDER — ACETAMINOPHEN 500 MG
2 TABLET ORAL
Qty: 0 | Refills: 0 | DISCHARGE
Start: 2018-03-03

## 2018-03-03 RX ORDER — SULINDAC 200 MG/1
1 TABLET ORAL
Qty: 0 | Refills: 0 | DISCHARGE
Start: 2018-03-03

## 2018-03-03 RX ORDER — IBUPROFEN 200 MG
1 TABLET ORAL
Qty: 0 | Refills: 0 | COMMUNITY

## 2018-03-03 RX ORDER — ONDANSETRON 8 MG/1
1 TABLET, FILM COATED ORAL
Qty: 21 | Refills: 0
Start: 2018-03-03 | End: 2018-03-09

## 2018-03-03 RX ADMIN — HYDROMORPHONE HYDROCHLORIDE 0.5 MILLIGRAM(S): 2 INJECTION INTRAMUSCULAR; INTRAVENOUS; SUBCUTANEOUS at 12:30

## 2018-03-03 RX ADMIN — ONDANSETRON 4 MILLIGRAM(S): 8 TABLET, FILM COATED ORAL at 11:27

## 2018-03-03 RX ADMIN — ONDANSETRON 4 MILLIGRAM(S): 8 TABLET, FILM COATED ORAL at 08:12

## 2018-03-03 RX ADMIN — SODIUM CHLORIDE 3 MILLILITER(S): 9 INJECTION INTRAMUSCULAR; INTRAVENOUS; SUBCUTANEOUS at 06:53

## 2018-03-03 RX ADMIN — HYDROMORPHONE HYDROCHLORIDE 0.5 MILLIGRAM(S): 2 INJECTION INTRAMUSCULAR; INTRAVENOUS; SUBCUTANEOUS at 12:45

## 2018-03-03 RX ADMIN — SODIUM CHLORIDE 3 MILLILITER(S): 9 INJECTION INTRAMUSCULAR; INTRAVENOUS; SUBCUTANEOUS at 14:15

## 2018-03-03 NOTE — PROGRESS NOTE ADULT - SUBJECTIVE AND OBJECTIVE BOX
Shunt reprogrammed from 2.0 to 1.5 yesterday without sifnificant change in symptoms or ICP  Still having headaches varying in intensity, not correlating to ICP changes  Vital Signs Last 24 Hrs  T(C): 37 (02 Mar 2018 20:00), Max: 37 (02 Mar 2018 20:00)  T(F): 98.6 (02 Mar 2018 20:00), Max: 98.6 (02 Mar 2018 20:00)  HR: 66 (02 Mar 2018 23:00) (50 - 87)  BP: 122/79 (02 Mar 2018 23:00) (80/43 - 137/85)  BP(mean): 88 (02 Mar 2018 23:00) (49 - 97)  RR: 15 (02 Mar 2018 23:00) (12 - 16)  SpO2: 100% (02 Mar 2018 23:00) (99% - 100%)  ICP (-)6 to 6    AAO X 3  PERRLA, EOMI  MCKENNA strength 5/5    MEDICATIONS  (STANDING):  acetaminophen  IVPB. 1000 milliGRAM(s) IV Intermittent once  sodium chloride 0.9% Bolus 500 milliLiter(s) IV Bolus once  sodium chloride 0.9% lock flush 3 milliLiter(s) IV Push every 8 hours  sodium chloride 0.9% lock flush 3 milliLiter(s) IV Push every 8 hours  topiramate 75 milliGRAM(s) Oral daily

## 2018-03-03 NOTE — PROGRESS NOTE ADULT - SUBJECTIVE AND OBJECTIVE BOX
HISTORY  28yo female with h/o hydrocephalus, with  shunt s/p multiple revisions, presents for ICP monitor placement today. Patient has been having severe headaches, worse with laying flat, a/w numbness of R face, upper extremity and lower extremity. No changes in vision/AMS. Baseline walks with cane, no recent changes in ambulation though does endorse weakness in RUE and RLE.       24 HOUR EVENTS: Patient continued to have symptoms, both while lying flat and otherwise. She had nausea, for which she received Zofran, and headaches, for which she received tylenol.    SUBJECTIVE/ROS:  [ x] A ten-point review of systems was otherwise negative except as noted.  [ ] Due to altered mental status/intubation, subjective information were not able to be obtained from the patient. History was obtained, to the extent possible, from review of the chart and collateral sources of information.      NEURO  RASS:     GCS:     CAM ICU: Negative  Exam: Awake, alert, nonfocal  CN II-XII intact  Meds: acetaminophen   Tablet. 650 milliGRAM(s) Oral every 6 hours PRN Mild Pain (1 - 3)  acetaminophen  IVPB. 1000 milliGRAM(s) IV Intermittent once  HYDROmorphone  Injectable 0.5 milliGRAM(s) IV Push every 4 hours PRN Severe Pain (7 - 10)  sulindac 200 milliGRAM(s) Oral two times a day PRN headaches  topiramate 75 milliGRAM(s) Oral daily  [x] Adequacy of sedation and pain control has been assessed and adjusted      RESPIRATORY  RR: 15 (03-02-18 @ 23:00) (12 - 16)  SpO2: 100% (03-02-18 @ 23:00) (99% - 100%)  Exam: unlabored, clear to auscultation bilaterally  Mechanical Ventilation: n/a        CARDIOVASCULAR  HR: 66 (03-02-18 @ 23:00) (50 - 87)  BP: 122/79 (03-02-18 @ 23:00) (80/43 - 137/85)  BP(mean): 88 (03-02-18 @ 23:00) (52 - 97)  Exam: s1/s2 no murmurs  Cardiac Rhythm: normal sinus  Perfusion     [ x]Adequate   [ ]Inadequate  Mentation   [x ]Normal       [ ]Reduced  Extremities  [ x]Warm         [ ]Cool  Volume Status [ ]Hypervolemic [x ]Euvolemic [ ]Hypovolemic  Meds: none      GI/NUTRITION  Exam: soft, NT  Diet: Regular  Meds: none    GENITOURINARY  I&O's Detail    03-01 @ 07:01  -  03-02 @ 07:00  --------------------------------------------------------  IN:    lactated ringers.: 645 mL    Oral Fluid: 220 mL  Total IN: 865 mL    OUT:    Voided: 1600 mL  Total OUT: 1600 mL    Total NET: -735 mL      03-02 @ 07:01  -  03-03 @ 03:00  --------------------------------------------------------  IN:    IV PiggyBack: 100 mL    Oral Fluid: 680 mL    Sodium Chloride 0.9% IV Bolus: 500 mL  Total IN: 1280 mL    OUT:    Voided: 1100 mL  Total OUT: 1100 mL    Total NET: 180 mL          03-01    139  |  104  |  13  ----------------------------<  178<H>  3.8   |  22  |  0.85    Ca    8.7      01 Mar 2018 16:39  Phos  3.1     03-02  Mg     2.0     03-02    TPro  6.9  /  Alb  3.8  /  TBili  0.2  /  DBili  x   /  AST  10  /  ALT  12  /  AlkPhos  96  03-01    [ ] Becerril catheter, indication: N/A  Meds: sodium chloride 0.9% Bolus 500 milliLiter(s) IV Bolus once        HEMATOLOGIC  Meds: none  [x] VTE Prophylaxis                        12.0   12.60 )-----------( 365      ( 02 Mar 2018 06:00 )             38.7       INFECTIOUS DISEASES  T(C): 37 (03-02-18 @ 20:00), Max: 37 (03-02-18 @ 20:00)  Wt(kg): --  WBC Count: 12.60 K/uL (03-02 @ 06:00)    ENDOCRINE  Capillary Blood Glucose    Meds:       ACCESS DEVICES:  [x ] Peripheral IV  [ ] Central Venous Line	[ ] R	[ ] L	[ ] IJ	[ ] Fem	[ ] SC	Placed:   [ ] Arterial Line		[ ] R	[ ] L	[ ] Fem	[ ] Rad	[ ] Ax	Placed:   [ ] PICC:					[ ] Mediport  [ ] Urinary Catheter, Date Placed:   [x ] Necessity of urinary, arterial, and venous catheters discussed    OTHER MEDICATIONS:  sodium chloride 0.9% lock flush 3 milliLiter(s) IV Push every 8 hours  sodium chloride 0.9% lock flush 3 milliLiter(s) IV Push every 8 hours      CODE STATUS: Full    IMAGING:

## 2018-03-03 NOTE — DISCHARGE NOTE ADULT - HOSPITAL COURSE
27y female hx of HCP sent for an ICP monitor. Pt was evaluated for a few days with monitor and shunt reprogramed to 1.5   No changes seen on monitor indicating a shunt malfunction. Stable for dc home today

## 2018-03-03 NOTE — DISCHARGE NOTE ADULT - PATIENT PORTAL LINK FT
You can access the [facility] Patient Portal, offered by [facility], by registering with the following website: [email or web address]

## 2018-03-03 NOTE — DISCHARGE NOTE ADULT - CARE PLAN
Principal Discharge DX:	Hydrocephalus, unspecified  Goal:	continue shunt at 1.5  Assessment and plan of treatment:	Follow up wit Dr Santana  May shower Monday

## 2018-03-03 NOTE — PROGRESS NOTE ADULT - ASSESSMENT
ASSESSMENT: 27 F with h/o hydrocephalus and  shunt, with headaches and R sided numbness, presents for ICP monitoring, SICU consulted for observation    PLAN:   Neuro: patient s/p ICP monitor R frontal, AOX3, with mild sensory deficits on the R. C/w ICP monitor and q4 hr neuro checks. C/w home topiramate, tylenol for HA.    CV: stable vital signs. C/w monitoring.  SLIV.  Resp: Patient on room air with no issues. continue to observe  GI: regular diet  : no issues  ID: no active issues  Heme: SCDS only for VTE ppx  Dispo: SICU ASSESSMENT: 27 F with h/o hydrocephalus and  shunt, with headaches and R sided numbness, presents for ICP monitoring, SICU consulted for observation    PLAN:   Neuro: patient s/p ICP monitor R frontal, AOX3, with mild sensory deficits on the R. C/w ICP monitor and q4 hr neuro checks. C/w home topiramate, clinorol for HA.  Dilaudid for severe pain.  D/c bolt  CV: stable vital signs. C/w monitoring.  SLIV.  Resp: Patient on room air with no issues. continue to observe  GI: regular diet  : no issues  ID: no active issues  Heme: SCDS only for VTE ppx  Dispo: SICU, d/c home or to floors

## 2018-03-03 NOTE — DISCHARGE NOTE ADULT - MEDICATION SUMMARY - MEDICATIONS TO STOP TAKING
I will STOP taking the medications listed below when I get home from the hospital:    ibuprofen 800 mg oral tablet  -- 1 tab(s) by mouth 3 times a day last dose on 2/26/18    Advil PM 38 mg-200 mg oral tablet  -- 1 tab(s) by mouth once a day (at bedtime) last dose on 2/26/18

## 2018-03-03 NOTE — DISCHARGE NOTE ADULT - CARE PROVIDER_API CALL
Manolo Santana), Pediatrics Neurosurgery  [address] [place] [address]  Phone: [phone]  Fax: [phone]

## 2018-03-03 NOTE — DISCHARGE NOTE ADULT - MEDICATION SUMMARY - MEDICATIONS TO TAKE
I will START or STAY ON the medications listed below when I get home from the hospital:    marijuana vape  -- 1 dose(s) inhaled , As Needed  -- Indication: For pain    Zquil  -- 1 dose(s) by mouth , As Needed  -- Indication: For PRN    acetaminophen 325 mg oral tablet  -- 2 tab(s) by mouth every 6 hours, As needed, Mild Pain (1 - 3)  -- Indication: For pain    sulindac 200 mg oral tablet  -- 1 tab(s) by mouth 2 times a day, As needed, headaches  -- Indication: For pain    naratriptan 2.5 mg oral tablet  -- 1 tab(s) by mouth 2 times a day, As Needed as per patient  -- Indication: For pain    nabumetone 750 mg oral tablet  -- 1 tab(s) by mouth 2 times a day as per patient as needed  -- Indication: For pain    topiramate 25 mg oral capsule  -- 3 cap(s) by mouth once a day (at bedtime)  -- Indication: For pain    Zofran 4 mg oral tablet  -- 1 tab(s) by mouth every 8 hours, As Needed   -- Indication: For nausea    turmeric 500 mg oral capsule  -- 2 cap(s) by mouth once a day last dose on 2/26/18  -- Indication: For Home med

## 2018-03-03 NOTE — DISCHARGE NOTE ADULT - NS MD DC PAIN INTERVENTION
Pt has constant headache and refuses medication most of the time., received PRN pain medication X1 today

## 2018-06-01 ENCOUNTER — OUTPATIENT (OUTPATIENT)
Dept: OUTPATIENT SERVICES | Facility: HOSPITAL | Age: 28
LOS: 1 days | End: 2018-06-01
Payer: MEDICAID

## 2018-06-01 DIAGNOSIS — Z98.890 OTHER SPECIFIED POSTPROCEDURAL STATES: Chronic | ICD-10-CM

## 2018-06-01 PROCEDURE — G9001: CPT

## 2018-06-15 DIAGNOSIS — R69 ILLNESS, UNSPECIFIED: ICD-10-CM

## 2018-07-01 ENCOUNTER — OUTPATIENT (OUTPATIENT)
Dept: OUTPATIENT SERVICES | Facility: HOSPITAL | Age: 28
LOS: 1 days | End: 2018-07-01

## 2018-07-01 DIAGNOSIS — Z98.890 OTHER SPECIFIED POSTPROCEDURAL STATES: Chronic | ICD-10-CM

## 2018-07-06 ENCOUNTER — OUTPATIENT (OUTPATIENT)
Dept: OUTPATIENT SERVICES | Facility: HOSPITAL | Age: 28
LOS: 1 days | End: 2018-07-06

## 2018-07-06 ENCOUNTER — APPOINTMENT (OUTPATIENT)
Dept: MRI IMAGING | Facility: HOSPITAL | Age: 28
End: 2018-07-06
Payer: MEDICAID

## 2018-07-06 DIAGNOSIS — Z98.890 OTHER SPECIFIED POSTPROCEDURAL STATES: Chronic | ICD-10-CM

## 2018-07-06 DIAGNOSIS — G91.9 HYDROCEPHALUS, UNSPECIFIED: ICD-10-CM

## 2018-07-06 PROCEDURE — 70551 MRI BRAIN STEM W/O DYE: CPT | Mod: 26

## 2018-07-09 ENCOUNTER — OUTPATIENT (OUTPATIENT)
Dept: OUTPATIENT SERVICES | Facility: HOSPITAL | Age: 28
LOS: 1 days | End: 2018-07-09
Payer: MEDICAID

## 2018-07-09 ENCOUNTER — APPOINTMENT (OUTPATIENT)
Dept: RADIOLOGY | Facility: HOSPITAL | Age: 28
End: 2018-07-09

## 2018-07-09 DIAGNOSIS — G91.9 HYDROCEPHALUS, UNSPECIFIED: ICD-10-CM

## 2018-07-09 DIAGNOSIS — Z98.890 OTHER SPECIFIED POSTPROCEDURAL STATES: Chronic | ICD-10-CM

## 2018-07-09 PROCEDURE — 70250 X-RAY EXAM OF SKULL: CPT | Mod: 26

## 2018-07-16 ENCOUNTER — APPOINTMENT (OUTPATIENT)
Dept: NUCLEAR MEDICINE | Facility: HOSPITAL | Age: 28
End: 2018-07-16

## 2018-07-17 PROBLEM — Q75.0 CRANIOSYNOSTOSIS: Chronic | Status: ACTIVE | Noted: 2017-03-02

## 2018-07-18 ENCOUNTER — OUTPATIENT (OUTPATIENT)
Dept: OUTPATIENT SERVICES | Facility: HOSPITAL | Age: 28
LOS: 1 days | End: 2018-07-18

## 2018-07-18 ENCOUNTER — APPOINTMENT (OUTPATIENT)
Dept: NUCLEAR MEDICINE | Facility: HOSPITAL | Age: 28
End: 2018-07-18
Payer: MEDICAID

## 2018-07-18 DIAGNOSIS — Z98.890 OTHER SPECIFIED POSTPROCEDURAL STATES: Chronic | ICD-10-CM

## 2018-07-18 DIAGNOSIS — G91.4 HYDROCEPHALUS IN DISEASES CLASSIFIED ELSEWHERE: ICD-10-CM

## 2018-07-18 PROCEDURE — 61070 BRAIN CANAL SHUNT PROCEDURE: CPT

## 2018-07-23 DIAGNOSIS — Z71.89 OTHER SPECIFIED COUNSELING: ICD-10-CM

## 2018-08-14 ENCOUNTER — APPOINTMENT (OUTPATIENT)
Dept: VASCULAR SURGERY | Facility: CLINIC | Age: 28
End: 2018-08-14
Payer: MEDICAID

## 2018-08-14 VITALS
WEIGHT: 160 LBS | BODY MASS INDEX: 27.31 KG/M2 | HEIGHT: 64 IN | HEART RATE: 78 BPM | SYSTOLIC BLOOD PRESSURE: 121 MMHG | DIASTOLIC BLOOD PRESSURE: 100 MMHG

## 2018-08-14 PROCEDURE — 99204 OFFICE O/P NEW MOD 45 MIN: CPT

## 2018-08-14 RX ORDER — IBUPROFEN 200 MG/1
200 TABLET, COATED ORAL 4 TIMES DAILY
Refills: 0 | Status: COMPLETED | COMMUNITY
Start: 2017-05-18 | End: 2018-08-14

## 2018-08-14 RX ORDER — NORTRIPTYLINE HYDROCHLORIDE 25 MG/1
25 CAPSULE ORAL 3 TIMES DAILY
Refills: 0 | Status: COMPLETED | COMMUNITY
Start: 2017-05-18 | End: 2018-08-14

## 2018-08-14 RX ORDER — OMEPRAZOLE 20 MG/1
20 CAPSULE, DELAYED RELEASE ORAL TWICE DAILY
Qty: 60 | Refills: 3 | Status: COMPLETED | COMMUNITY
Start: 2017-05-31 | End: 2018-08-14

## 2018-09-07 NOTE — ED ADULT NURSE NOTE - HEART RATE (BEATS/MIN)
From: Sariah Whitmore  To: Anastacio Lopez MD  Sent: 9/7/2018 7:55 AM CDT  Subject: esophogus julita     Thank you so much for calling in the gi cocktail for me. However I had 2 more episodes since I had last talked to you. I am wondering if it's possible to just go ahead and schedule a scope before the end of the year. I have had 2 sinus surgeries already so my insurance will cover everything at a 100%. I just don't want it to keep up and need one at the beginning of the next year. I don't know if Dr. Lopez does scopes on Tuesdays or not but, I am scheduled to be off o Dec. 4.      thank you,   Sariah   
Message left for her.  We will see how she is clinically doing the present time.  She had an EGD done in October of last year.  If things don't improve we can always schedule an EGD.  
Patient returned phone call. She really would like to proceed with another EGD at this time. She was scheduled on 12/4/18 at [facility].  
110

## 2018-09-14 ENCOUNTER — APPOINTMENT (OUTPATIENT)
Dept: MRI IMAGING | Facility: HOSPITAL | Age: 28
End: 2018-09-14

## 2018-09-17 ENCOUNTER — APPOINTMENT (OUTPATIENT)
Dept: MRI IMAGING | Facility: HOSPITAL | Age: 28
End: 2018-09-17

## 2018-09-17 ENCOUNTER — APPOINTMENT (OUTPATIENT)
Dept: MRI IMAGING | Facility: HOSPITAL | Age: 28
End: 2018-09-17
Payer: MEDICAID

## 2018-09-17 ENCOUNTER — OUTPATIENT (OUTPATIENT)
Dept: OUTPATIENT SERVICES | Facility: HOSPITAL | Age: 28
LOS: 1 days | End: 2018-09-17

## 2018-09-17 DIAGNOSIS — Z98.890 OTHER SPECIFIED POSTPROCEDURAL STATES: Chronic | ICD-10-CM

## 2018-09-17 DIAGNOSIS — M21.511: ICD-10-CM

## 2018-09-17 PROCEDURE — 72146 MRI CHEST SPINE W/O DYE: CPT | Mod: 26

## 2018-09-17 PROCEDURE — 72141 MRI NECK SPINE W/O DYE: CPT | Mod: 26

## 2018-09-17 PROCEDURE — 72148 MRI LUMBAR SPINE W/O DYE: CPT | Mod: 26

## 2018-09-20 ENCOUNTER — OUTPATIENT (OUTPATIENT)
Dept: OUTPATIENT SERVICES | Facility: HOSPITAL | Age: 28
LOS: 1 days | End: 2018-09-20

## 2018-09-20 ENCOUNTER — APPOINTMENT (OUTPATIENT)
Dept: MRI IMAGING | Facility: HOSPITAL | Age: 28
End: 2018-09-20
Payer: MEDICAID

## 2018-09-20 DIAGNOSIS — Z98.890 OTHER SPECIFIED POSTPROCEDURAL STATES: Chronic | ICD-10-CM

## 2018-09-20 DIAGNOSIS — M21.511: ICD-10-CM

## 2018-09-20 PROCEDURE — 73721 MRI JNT OF LWR EXTRE W/O DYE: CPT | Mod: 26,RT

## 2018-09-20 PROCEDURE — 73221 MRI JOINT UPR EXTREM W/O DYE: CPT | Mod: 26,RT

## 2018-11-19 NOTE — PRE-OP CHECKLIST - ISOLATION PRECAUTIONS
Message   Recorded as Task   Date: 05/30/2018 11:17 AM, Created By: FRANKEL,JENNIFER   Task Name: Go to Note   Assigned To: Gerhardt,Anne   Regarding Patient: SINDY RODGERS, Status: Active   Comment:    FRANKEL,JENNIFER - 30 May 2018 11:17 AM     TASK CREATED  Please send to pt/pcp. All biopsies from egd/colon were benign. Colonoscopy in 10 years.thanks   Gerhardt,Anne - 30 May 2018 11:36 AM     TASK REPLIED TO: Previously Assigned To Gerhardt,Anne  pt states she still has the tightness in her throat with a little pain what should she do about this? Also, on her report it states irregular z-line and would like to know what that means? thanks   FRANKEL,JENNIFER - 30 May 2018 11:38 AM     TASK REPLIED TO: Previously Assigned To FRANKEL,JENNIFER  Irregular z line is a descriptor of her anatomy-where the esophagus joins the stomach, it is common and doesn't mean anything. I would have her call and discuss those symptoms with her pcp. It is unusual to have any residual symptoms so far out from the procedure.thanks   Gerhardt,Anne - 30 May 2018 11:40 AM     TASK REPLIED TO: Previously Assigned To Gerhardt,Anne  she states her report that was given to her at the [facility] says she has a hernia in her throat?   FRANKEL,JENNIFER - 30 May 2018 11:43 AM     TASK REPLIED TO: Previously Assigned To FRANKEL,JENNIFER  She has a 1-2 cm hiatal hernia which just means that a very small  (< 1 inch) portion of her stomach slides up and down through her diaphragm. It is not in her throat-but at the top of her abdomen. It is benign and also very common, and is not causing her problems. I do not evaluate the throat with the EGD scope. The esophagus starts at the point near her voice box and goes through the chest so if her throat feels tight, it is not a GI problem and is something for her to discuss with her pcp/consider ent eval.   Gerhardt,Anne - 30 May 2018 11:51 AM     TASK EDITED  patient verbalized understanding and will follow  none up with PCP        Signatures   Electronically signed by : Anne Gerhardt, CMA; May 30 2018 11:51AM CST

## 2018-11-20 NOTE — DISCHARGE NOTE ADULT - THE PATIENT HAS
Online Visit    Date/Time: 2/28/2018 12:56:52 PM  To: Ash Monaco  From: Tennille Olivas  Subject: Elbow Xray Results   Dear Mr. French Melendez,    The official read from the radiologist on your left elbow xray results show good bone structures, no effusions or fractures, with adequate joint spaces. Similar to what we talked about yesterday in [facility]. Please make a follow up appointment with Dr. Wendy Nguyen if you wish to receive an orthopedic referral for your elbow, or you can start physical therapy (which has already been ordered for you). If you have any questions, please let me know. Sincerely,  Tennille Olivas, APN, CNP    Verified Results  XR ELBOW LT MIN 3V [id number] 07:44PM Tennille Brendon     Test Name Result Flag Reference   XR ELBOW LT MIN 3V (Report)     Accession #    [id number]    EXAM: XR ELBOW LT MIN 3V    CLINICAL INDICATION: 58-year-old male presents with complaint of left elbow pain. COMPARISON: None    FINDINGS: The bony structures, soft tissues and joint spaces are intact. There is no fracture,   dislocation or joint effusion. IMPRESSION:    Unremarkable left elbow.     **** F I N A L ****    Transcribed By: ANNIE   02/28/18 8:09 am    Dictated By:      Aaron Carlos MD    Electronically Reviewed and Approved By:      Aaron Carlos MD 02/28/18 8:11 am no difficulties

## 2019-01-02 ENCOUNTER — APPOINTMENT (OUTPATIENT)
Dept: SPINE | Facility: CLINIC | Age: 29
End: 2019-01-02
Payer: MEDICAID

## 2019-01-02 VITALS
HEIGHT: 64 IN | RESPIRATION RATE: 16 BRPM | BODY MASS INDEX: 25.61 KG/M2 | DIASTOLIC BLOOD PRESSURE: 70 MMHG | WEIGHT: 150 LBS | SYSTOLIC BLOOD PRESSURE: 102 MMHG | HEART RATE: 80 BPM

## 2019-01-02 DIAGNOSIS — G91.9 HYDROCEPHALUS, UNSPECIFIED: ICD-10-CM

## 2019-01-02 DIAGNOSIS — R20.0 ANESTHESIA OF SKIN: ICD-10-CM

## 2019-01-02 DIAGNOSIS — R20.2 ANESTHESIA OF SKIN: ICD-10-CM

## 2019-01-02 PROCEDURE — 99204 OFFICE O/P NEW MOD 45 MIN: CPT

## 2019-01-02 RX ORDER — DIPHENHYDRAMINE HCL, IBUPROFEN 25; 200 MG/1; MG/1
CAPSULE, LIQUID FILLED ORAL
Refills: 0 | Status: ACTIVE | COMMUNITY

## 2019-03-13 ENCOUNTER — EMERGENCY (EMERGENCY)
Facility: HOSPITAL | Age: 29
LOS: 1 days | Discharge: HOME | End: 2019-03-13
Attending: EMERGENCY MEDICINE | Admitting: EMERGENCY MEDICINE
Payer: MEDICAID

## 2019-03-13 VITALS
DIASTOLIC BLOOD PRESSURE: 93 MMHG | TEMPERATURE: 98 F | OXYGEN SATURATION: 100 % | RESPIRATION RATE: 18 BRPM | HEART RATE: 82 BPM | SYSTOLIC BLOOD PRESSURE: 137 MMHG

## 2019-03-13 DIAGNOSIS — Z98.890 OTHER SPECIFIED POSTPROCEDURAL STATES: Chronic | ICD-10-CM

## 2019-03-13 LAB
ALBUMIN SERPL ELPH-MCNC: 4.4 G/DL — SIGNIFICANT CHANGE UP (ref 3.3–5)
ALP SERPL-CCNC: 109 U/L — SIGNIFICANT CHANGE UP (ref 40–120)
ALT FLD-CCNC: 36 U/L — HIGH (ref 4–33)
ANION GAP SERPL CALC-SCNC: 14 MMO/L — SIGNIFICANT CHANGE UP (ref 7–14)
AST SERPL-CCNC: 17 U/L — SIGNIFICANT CHANGE UP (ref 4–32)
BASOPHILS # BLD AUTO: 0.02 K/UL — SIGNIFICANT CHANGE UP (ref 0–0.2)
BASOPHILS NFR BLD AUTO: 0.2 % — SIGNIFICANT CHANGE UP (ref 0–2)
BILIRUB SERPL-MCNC: < 0.2 MG/DL — LOW (ref 0.2–1.2)
BUN SERPL-MCNC: 14 MG/DL — SIGNIFICANT CHANGE UP (ref 7–23)
CALCIUM SERPL-MCNC: 9.5 MG/DL — SIGNIFICANT CHANGE UP (ref 8.4–10.5)
CHLORIDE SERPL-SCNC: 102 MMOL/L — SIGNIFICANT CHANGE UP (ref 98–107)
CO2 SERPL-SCNC: 25 MMOL/L — SIGNIFICANT CHANGE UP (ref 22–31)
CREAT SERPL-MCNC: 0.94 MG/DL — SIGNIFICANT CHANGE UP (ref 0.5–1.3)
EOSINOPHIL # BLD AUTO: 0.13 K/UL — SIGNIFICANT CHANGE UP (ref 0–0.5)
EOSINOPHIL NFR BLD AUTO: 1.5 % — SIGNIFICANT CHANGE UP (ref 0–6)
GLUCOSE SERPL-MCNC: 89 MG/DL — SIGNIFICANT CHANGE UP (ref 70–99)
HCT VFR BLD CALC: 42 % — SIGNIFICANT CHANGE UP (ref 34.5–45)
HGB BLD-MCNC: 12.3 G/DL — SIGNIFICANT CHANGE UP (ref 11.5–15.5)
IMM GRANULOCYTES NFR BLD AUTO: 0.5 % — SIGNIFICANT CHANGE UP (ref 0–1.5)
LYMPHOCYTES # BLD AUTO: 2.89 K/UL — SIGNIFICANT CHANGE UP (ref 1–3.3)
LYMPHOCYTES # BLD AUTO: 33.1 % — SIGNIFICANT CHANGE UP (ref 13–44)
MCHC RBC-ENTMCNC: 22.7 PG — LOW (ref 27–34)
MCHC RBC-ENTMCNC: 29.3 % — LOW (ref 32–36)
MCV RBC AUTO: 77.3 FL — LOW (ref 80–100)
MONOCYTES # BLD AUTO: 0.52 K/UL — SIGNIFICANT CHANGE UP (ref 0–0.9)
MONOCYTES NFR BLD AUTO: 6 % — SIGNIFICANT CHANGE UP (ref 2–14)
NEUTROPHILS # BLD AUTO: 5.12 K/UL — SIGNIFICANT CHANGE UP (ref 1.8–7.4)
NEUTROPHILS NFR BLD AUTO: 58.7 % — SIGNIFICANT CHANGE UP (ref 43–77)
NRBC # FLD: 0 K/UL — LOW (ref 25–125)
PLATELET # BLD AUTO: 232 K/UL — SIGNIFICANT CHANGE UP (ref 150–400)
PMV BLD: SIGNIFICANT CHANGE UP FL (ref 7–13)
POTASSIUM SERPL-MCNC: 4.1 MMOL/L — SIGNIFICANT CHANGE UP (ref 3.5–5.3)
POTASSIUM SERPL-SCNC: 4.1 MMOL/L — SIGNIFICANT CHANGE UP (ref 3.5–5.3)
PROT SERPL-MCNC: 7.7 G/DL — SIGNIFICANT CHANGE UP (ref 6–8.3)
RBC # BLD: 5.43 M/UL — HIGH (ref 3.8–5.2)
RBC # FLD: SIGNIFICANT CHANGE UP % (ref 10.3–14.5)
REVIEW TO FOLLOW: YES — SIGNIFICANT CHANGE UP
SODIUM SERPL-SCNC: 141 MMOL/L — SIGNIFICANT CHANGE UP (ref 135–145)
WBC # BLD: 8.72 K/UL — SIGNIFICANT CHANGE UP (ref 3.8–10.5)
WBC # FLD AUTO: 8.72 K/UL — SIGNIFICANT CHANGE UP (ref 3.8–10.5)

## 2019-03-13 PROCEDURE — 99284 EMERGENCY DEPT VISIT MOD MDM: CPT

## 2019-03-13 RX ORDER — ONDANSETRON 8 MG/1
4 TABLET, FILM COATED ORAL ONCE
Qty: 0 | Refills: 0 | Status: COMPLETED | OUTPATIENT
Start: 2019-03-13 | End: 2019-03-13

## 2019-03-13 RX ADMIN — ONDANSETRON 4 MILLIGRAM(S): 8 TABLET, FILM COATED ORAL at 23:12

## 2019-03-13 NOTE — ED ADULT NURSE NOTE - CHIEF COMPLAINT QUOTE
pt has PMH of 13 brain surgeries and has a  shunt. Has severe headache with nausea. No vomiting. Was seen by Neuro surgeon Yesterday but can't wait for further testing. No Fever. Headache is radiating to the right ear. [facility]

## 2019-03-13 NOTE — ED ADULT NURSE NOTE - OBJECTIVE STATEMENT
Pt presented to ED with c/o headache, right neck pain radiating to right ear since Sunday. pt reported having a  shunt, was seen by her neurosurgeon but pain is getting progressively worse.

## 2019-03-13 NOTE — ED ADULT TRIAGE NOTE - CHIEF COMPLAINT QUOTE
pt has PMH of 13 brain surgeries and has a  shunt. Has severe headache with nausea. No vomiting. Was seen by Neuro surgeon Yesterday but can't wait for further testing. No Fever. Headache is radiating to the right ear. [facility]

## 2019-03-14 VITALS
HEART RATE: 78 BPM | SYSTOLIC BLOOD PRESSURE: 111 MMHG | DIASTOLIC BLOOD PRESSURE: 67 MMHG | OXYGEN SATURATION: 100 % | RESPIRATION RATE: 18 BRPM

## 2019-03-14 DIAGNOSIS — G91.9 HYDROCEPHALUS, UNSPECIFIED: ICD-10-CM

## 2019-03-14 PROCEDURE — 71046 X-RAY EXAM CHEST 2 VIEWS: CPT | Mod: 26

## 2019-03-14 PROCEDURE — 70250 X-RAY EXAM OF SKULL: CPT | Mod: 26

## 2019-03-14 PROCEDURE — 74019 RADEX ABDOMEN 2 VIEWS: CPT | Mod: 26

## 2019-03-14 PROCEDURE — 70450 CT HEAD/BRAIN W/O DYE: CPT | Mod: 26

## 2019-03-14 RX ORDER — METOCLOPRAMIDE HCL 10 MG
10 TABLET ORAL ONCE
Qty: 0 | Refills: 0 | Status: COMPLETED | OUTPATIENT
Start: 2019-03-14 | End: 2019-03-14

## 2019-03-14 RX ADMIN — Medication 10 MILLIGRAM(S): at 03:08

## 2019-03-14 NOTE — ED PROVIDER NOTE - NSFOLLOWUPINSTRUCTIONS_ED_ALL_ED_FT
Follow up with your PCP in 24-48 hours.   Follow up with Dr. Santana as discussed.   Return to the ER if you develop any new or worsening symptoms such as fever, chills, worsening headache, chest pain, SOB, numbness, weakness, nausea, vomiting, or visual changes.

## 2019-03-14 NOTE — ED PROVIDER NOTE - OBJECTIVE STATEMENT
27F hx congenital hydrocephalus s/p  shunt with multiple prior revisions for intractable headaches p/w worsening HA x 1 week. Associated with nausea, dizziness, and numbness over her shunt valve. Reports long-standing R upper and lower extremity weakness but denies any new weakness, visual changes, chest pain, SOB. Saw her neurologist (Manolo Santana) this week, who scheduled her for shunt series and MRI next week. Coming to the ER today as she was unable to wait until next week.

## 2019-03-14 NOTE — CONSULT NOTE ADULT - SUBJECTIVE AND OBJECTIVE BOX
HPI:  27F hx congenital hydrocephalus with  shunt last revised 9/2017 now p/w pain around the valve x 3-4 days. Associated with nausea, dizziness, and numbness over her shunt valve. She has chronic headaches and states this is different. Reports long-standing R upper and lower extremity weakness but denies any new weakness, visual changes, chest pain, SOB. Saw her neurosurgeon (Manolo Santana) this week, who scheduled her for shunt series and MRI next week. Coming to the ER today as she was unable to wait until next week.    PAST MEDICAL & SURGICAL HISTORY:  Craniosynostosis  Meniere disease: Pt  discontinued   triamterene/HCTZ,  stopped March. Pt states MD aware  Obstructive hydrocephalus:  shunt placed at birth with multiple revisions for malfunction. LAST REVISION 02/2015  Strabismus  Personal History of Hydrocephalus (ICD9 V12.49)  S/P craniotomy: cranial vault expansion  Ventriculo-Peritoneal Shunt Status (ICD9 V45.2):  shunt placed at birth with multiple revisions 1990 2002 x2 2003 2004 2007 2009 2011 2015 .    Medications:  metoclopramide Injectable 10 milliGRAM(s) IV Push Once    SOCIAL HISTORY:  FAMILY HISTORY:  No pertinent family history    Vital Signs Last 24 Hrs  T(C): 37.3 (13 Mar 2019 23:03), Max: 37.3 (13 Mar 2019 23:03)  T(F): 99.1 (13 Mar 2019 23:03), Max: 99.1 (13 Mar 2019 23:03)  HR: 76 (13 Mar 2019 23:03) (76 - 82)  BP: 143/83 (13 Mar 2019 23:03) (137/93 - 143/83)  BP(mean): --  RR: 17 (13 Mar 2019 23:03) (17 - 18)  SpO2: 100% (13 Mar 2019 23:03) (100% - 100%)    PHYSICAL EXAM:  Awake Alert Attentive Affect appropriate Ox3  PERRL EOMI  MCKENNA x4 RUE 4/5 (@baseline)  No drift  Shunt pumps and refills  Strata Valve setting confirmed at 1.0    LABS:                          12.3   8.72  )-----------( 232      ( 13 Mar 2019 22:54 )             42.0     03-13    141  |  102  |  14  ----------------------------<  89  4.1   |  25  |  0.94    Ca    9.5      13 Mar 2019 22:54    TPro  7.7  /  Alb  4.4  /  TBili  < 0.2<L>  /  DBili  x   /  AST  17  /  ALT  36<H>  /  AlkPhos  109  03-13          RADIOLOGY & ADDITIONAL STUDIES:  < from: CT Head No Cont (03.14.19 @ 01:31) >  FINDINGS:     Redemonstration of bilateral parietal and right occipital approach    shunt catheters in unchanged position. Status post bifrontal craniotomy.    The lateral and fourth ventricles are slitlike. Unchanged appearance of   the third ventricle.  Unchanged encephalomalacia and gliosis involving the parieto-occipital   regions with associated sulcal dilatation.    Unchanged appearance of small curvilinear foci of hyperdensity within the   bilateral frontoparietal subarachnoid spaces, likely reflecting   calcification and the sequela of old hemorrhage (2-13).     IMPRESSION:     No acute intracranial hemorrhage or mass effect.    Stable ventricular size.    < end of copied text >

## 2019-03-14 NOTE — ED PROVIDER NOTE - NS ED ROS FT
GENERAL: No fever or chills  EYES: no change in vision  HEENT: no trouble swallowing or speaking  CARDIAC: no chest pain  PULMONARY: no cough or SOB  GI: no abdominal pain, no nausea, no vomiting, no diarrhea or constipation  : No changes in urination  SKIN: no rashes  NEURO: +HA and chronic R-sided numbness. +numbness over shunt valve behind right ear.   MSK: No joint pain     ~Prabhakar Jamison PGY1

## 2019-03-14 NOTE — CONSULT NOTE ADULT - ASSESSMENT
28year old female with PMH of HCP wit VPS p/w pain and numbness around shunt valve. CTH with stable ventricular size, normal shunt series.

## 2019-03-14 NOTE — CONSULT NOTE ADULT - NSICDXPROBLEM_GEN_ALL_CORE_FT
PROBLEM DIAGNOSES  Problem: Hydrocephalus  Recommendation: 1. Pt with stable CTH, no acute neurosurgical intervention indicated at this time  2. Pt to follow up with Dr. Santana in the office  3. Return to ER if worsening of symptoms, fever, vomiting, increased lethargy  Case d/w Dr. Nesbitt

## 2019-03-14 NOTE — ED PROVIDER NOTE - CLINICAL SUMMARY MEDICAL DECISION MAKING FREE TEXT BOX
Acute on chronic HAs. Pt well appearing with unremarkable vitals. No new neurologic findings. Will obtain shunt series/CT and neurosurgery consult. Symptom control with Reglan and reassess. Sabi att: Acute on chronic HAs. Pt well appearing with unremarkable vitals. No new neurologic findings. Will obtain shunt series/CT and neurosurgery consult. Symptom control with Reglan and reassess.

## 2019-03-14 NOTE — ED PROVIDER NOTE - PHYSICAL EXAMINATION
Gen: AAOx3, non-toxic  Head: NCAT  HEENT: EOMI, oral mucosa moist, normal conjunctiva  Lung: CTAB, no respiratory distress, no wheezes/rhonchi/rales B/L, speaking in full sentences  CV: RRR, no murmurs, rubs or gallops  Abd: soft, NTND, no guarding, no CVA tenderness  MSK: no visible deformities  Neuro: chronic R sided numbness, no weakness, normal CN exam   Skin: Warm, well perfused, no rash  Psych: normal affect.     ~Prabhakar Jamison PGY1

## 2019-03-19 ENCOUNTER — APPOINTMENT (OUTPATIENT)
Dept: MRI IMAGING | Facility: HOSPITAL | Age: 29
End: 2019-03-19

## 2019-03-19 ENCOUNTER — OUTPATIENT (OUTPATIENT)
Dept: OUTPATIENT SERVICES | Facility: HOSPITAL | Age: 29
LOS: 1 days | End: 2019-03-19

## 2019-03-19 DIAGNOSIS — G91.9 HYDROCEPHALUS, UNSPECIFIED: ICD-10-CM

## 2019-03-19 DIAGNOSIS — Z98.890 OTHER SPECIFIED POSTPROCEDURAL STATES: Chronic | ICD-10-CM

## 2019-03-19 PROCEDURE — 70551 MRI BRAIN STEM W/O DYE: CPT | Mod: 26

## 2019-08-30 ENCOUNTER — EMERGENCY (EMERGENCY)
Facility: HOSPITAL | Age: 29
LOS: 1 days | Discharge: HOME | End: 2019-08-30
Admitting: EMERGENCY MEDICINE
Payer: MEDICAID

## 2019-08-30 VITALS
SYSTOLIC BLOOD PRESSURE: 130 MMHG | DIASTOLIC BLOOD PRESSURE: 76 MMHG | OXYGEN SATURATION: 100 % | HEART RATE: 90 BPM | RESPIRATION RATE: 18 BRPM | TEMPERATURE: 98 F

## 2019-08-30 VITALS
RESPIRATION RATE: 15 BRPM | HEART RATE: 83 BPM | SYSTOLIC BLOOD PRESSURE: 112 MMHG | DIASTOLIC BLOOD PRESSURE: 60 MMHG | TEMPERATURE: 98 F | OXYGEN SATURATION: 100 %

## 2019-08-30 DIAGNOSIS — Z98.890 OTHER SPECIFIED POSTPROCEDURAL STATES: Chronic | ICD-10-CM

## 2019-08-30 LAB
ALBUMIN SERPL ELPH-MCNC: 3.9 G/DL — SIGNIFICANT CHANGE UP (ref 3.3–5)
ALP SERPL-CCNC: 39 U/L — LOW (ref 40–120)
ALT FLD-CCNC: 14 U/L — SIGNIFICANT CHANGE UP (ref 4–33)
ANION GAP SERPL CALC-SCNC: 15 MMO/L — HIGH (ref 7–14)
APPEARANCE UR: SIGNIFICANT CHANGE UP
AST SERPL-CCNC: 12 U/L — SIGNIFICANT CHANGE UP (ref 4–32)
BACTERIA # UR AUTO: SIGNIFICANT CHANGE UP
BASOPHILS # BLD AUTO: 0.01 K/UL — SIGNIFICANT CHANGE UP (ref 0–0.2)
BASOPHILS NFR BLD AUTO: 0.1 % — SIGNIFICANT CHANGE UP (ref 0–2)
BILIRUB SERPL-MCNC: < 0.2 MG/DL — LOW (ref 0.2–1.2)
BILIRUB UR-MCNC: NEGATIVE — SIGNIFICANT CHANGE UP
BLOOD UR QL VISUAL: NEGATIVE — SIGNIFICANT CHANGE UP
BUN SERPL-MCNC: 11 MG/DL — SIGNIFICANT CHANGE UP (ref 7–23)
CALCIUM SERPL-MCNC: 9.5 MG/DL — SIGNIFICANT CHANGE UP (ref 8.4–10.5)
CHLORIDE SERPL-SCNC: 103 MMOL/L — SIGNIFICANT CHANGE UP (ref 98–107)
CO2 SERPL-SCNC: 19 MMOL/L — LOW (ref 22–31)
COLOR SPEC: SIGNIFICANT CHANGE UP
CREAT SERPL-MCNC: 0.58 MG/DL — SIGNIFICANT CHANGE UP (ref 0.5–1.3)
EOSINOPHIL # BLD AUTO: 0.03 K/UL — SIGNIFICANT CHANGE UP (ref 0–0.5)
EOSINOPHIL NFR BLD AUTO: 0.2 % — SIGNIFICANT CHANGE UP (ref 0–6)
GLUCOSE SERPL-MCNC: 77 MG/DL — SIGNIFICANT CHANGE UP (ref 70–99)
GLUCOSE UR-MCNC: NEGATIVE — SIGNIFICANT CHANGE UP
HCT VFR BLD CALC: 37.1 % — SIGNIFICANT CHANGE UP (ref 34.5–45)
HGB BLD-MCNC: 11.3 G/DL — LOW (ref 11.5–15.5)
HYALINE CASTS # UR AUTO: NEGATIVE — SIGNIFICANT CHANGE UP
IMM GRANULOCYTES NFR BLD AUTO: 0.5 % — SIGNIFICANT CHANGE UP (ref 0–1.5)
KETONES UR-MCNC: SIGNIFICANT CHANGE UP
LEUKOCYTE ESTERASE UR-ACNC: SIGNIFICANT CHANGE UP
LYMPHOCYTES # BLD AUTO: 3.79 K/UL — HIGH (ref 1–3.3)
LYMPHOCYTES # BLD AUTO: 30.9 % — SIGNIFICANT CHANGE UP (ref 13–44)
MCHC RBC-ENTMCNC: 23.1 PG — LOW (ref 27–34)
MCHC RBC-ENTMCNC: 30.5 % — LOW (ref 32–36)
MCV RBC AUTO: 75.7 FL — LOW (ref 80–100)
MONOCYTES # BLD AUTO: 0.73 K/UL — SIGNIFICANT CHANGE UP (ref 0–0.9)
MONOCYTES NFR BLD AUTO: 5.9 % — SIGNIFICANT CHANGE UP (ref 2–14)
NEUTROPHILS # BLD AUTO: 7.66 K/UL — HIGH (ref 1.8–7.4)
NEUTROPHILS NFR BLD AUTO: 62.4 % — SIGNIFICANT CHANGE UP (ref 43–77)
NITRITE UR-MCNC: NEGATIVE — SIGNIFICANT CHANGE UP
NRBC # FLD: 0 K/UL — SIGNIFICANT CHANGE UP (ref 0–0)
PH UR: 6.5 — SIGNIFICANT CHANGE UP (ref 5–8)
PLATELET # BLD AUTO: 287 K/UL — SIGNIFICANT CHANGE UP (ref 150–400)
PMV BLD: 10.3 FL — SIGNIFICANT CHANGE UP (ref 7–13)
POTASSIUM SERPL-MCNC: 3.8 MMOL/L — SIGNIFICANT CHANGE UP (ref 3.5–5.3)
POTASSIUM SERPL-SCNC: 3.8 MMOL/L — SIGNIFICANT CHANGE UP (ref 3.5–5.3)
PROT SERPL-MCNC: 7.2 G/DL — SIGNIFICANT CHANGE UP (ref 6–8.3)
PROT UR-MCNC: 20 — SIGNIFICANT CHANGE UP
RBC # BLD: 4.9 M/UL — SIGNIFICANT CHANGE UP (ref 3.8–5.2)
RBC # FLD: 14.7 % — HIGH (ref 10.3–14.5)
RBC CASTS # UR COMP ASSIST: SIGNIFICANT CHANGE UP (ref 0–?)
SODIUM SERPL-SCNC: 137 MMOL/L — SIGNIFICANT CHANGE UP (ref 135–145)
SP GR SPEC: 1.02 — SIGNIFICANT CHANGE UP (ref 1–1.04)
SQUAMOUS # UR AUTO: SIGNIFICANT CHANGE UP
UROBILINOGEN FLD QL: NORMAL — SIGNIFICANT CHANGE UP
WBC # BLD: 12.28 K/UL — HIGH (ref 3.8–10.5)
WBC # FLD AUTO: 12.28 K/UL — HIGH (ref 3.8–10.5)
WBC UR QL: HIGH (ref 0–?)

## 2019-08-30 PROCEDURE — 99285 EMERGENCY DEPT VISIT HI MDM: CPT | Mod: 25

## 2019-08-30 PROCEDURE — 76815 OB US LIMITED FETUS(S): CPT | Mod: 26

## 2019-08-30 RX ORDER — CEPHALEXIN 500 MG
500 CAPSULE ORAL ONCE
Refills: 0 | Status: COMPLETED | OUTPATIENT
Start: 2019-08-30 | End: 2019-08-30

## 2019-08-30 RX ADMIN — Medication 500 MILLIGRAM(S): at 23:47

## 2019-08-30 NOTE — ED PROVIDER NOTE - OBJECTIVE STATEMENT
28 year old female with a PMHx of hydrocephalus with  shunt pw lower back pain that radiates bilaterally to the pelvis today at 18:00. Pt states that she was at physical therapy and afterwards she developed pain. Endorses that she has had urinary frequency for the past few days and hx of UTIs. Denies n/v/f/c, CP, SOB, dysuria, hematuria, melena, hematochezia, diarrhea, constipation, vaginal discharge, vaginal bleeding. OBGYN Dr. Schilling.

## 2019-08-30 NOTE — ED PROVIDER NOTE - NSFOLLOWUPINSTRUCTIONS_ED_ALL_ED_FT
Follow up with your Ob/Gyn doctor in 3 - 4 days.  Notify your primary medical doctor of your ER visit.  If you need to find a doctor to follow up with, you may call the [facility] Patient Access Services helpline at [phone] to find names/contact #s for a practitioner (or specialist) to follow up with.  Bring your discharge papers / test results with you to your follow up appointment(s).  Rest / no strenuous activity.  Stay well hydrated.  Take Tylenol if needed for any discomfort; follow package instructions.  To follow up on any pending results (Urine Culture), you may call the main ED # [phone] from 9am - 2pm; please ask to speak to the ED Administrative PA or Resident.  ***Return to the Emergency Department if you experience any new / worsening symptoms or have any problems / concerns.***

## 2019-08-30 NOTE — CONSULT NOTE ADULT - ASSESSMENT
27 y/o  @ 15wks THERESA  presents for lower back pain that radiates to abdomen after physical therapy, no obstetrical complaints, Cervical length 4.4

## 2019-08-30 NOTE — CONSULT NOTE ADULT - PROBLEM SELECTOR RECOMMENDATION 9
- Low suspicion for obstetrical concern w/ new onset back pain.   - Can f/u Ucx for treatment of UTI, as opposed to empirically treating  - Patient can take Tylenol for pain control  -f/u w/ Dr. Schilling in the office    JOSHUA Eid PGY2  d/w Dr. Campbell

## 2019-08-30 NOTE — CONSULT NOTE ADULT - SUBJECTIVE AND OBJECTIVE BOX
AKILAH BEARDEN  28y  Female [id number]    HPI:  27 y/o  @ 15wks THERESA  presents for lower back pain that radiates to the abdomen which started tonight at physical therapy while stretching. Patient has a history of hydrocephalus and  shunt with 13 brain surgeries over the span of her life most recently in . Patient is doing physical therapy for R sided neuropathy. She states that prior to pregnancy she was taking Lyrica and Flexeril for pain, however, now she has been going to pain management for shots, and taking Tylenol which doesn't help. Patient denies vaginal bleeding, loss of fluid, cramping abdominal pain.         Name of GYN Physician: Dr. Schilling    POB:  Denies    Pgyn: Denies fibroids, cysts, endometriosis, STI's, Abnormal pap smears     Home meds: PNV      Allergies    No Known Allergies      PAST MEDICAL & SURGICAL HISTORY:  Craniosynostosis  Meniere disease: Pt  discontinued   triamterene/HCTZ,  stopped March. Pt states MD aware  Obstructive hydrocephalus:  shunt placed at birth with multiple revisions for malfunction. LAST REVISION 2015  Strabismus  Personal History of Hydrocephalus (ICD9 V12.49)  S/P craniotomy: cranial vault expansion  Ventriculo-Peritoneal Shunt Status (ICD9 V45.2):  shunt placed at birth with multiple revisions 1990 2002 x2 2003 .      FAMILY HISTORY:  No pertinent family history      Social History:  Denies smoking use, drug use, alcohol use.       Vital Signs Last 24 Hrs  T(C): 36.7 (30 Aug 2019 23:51), Max: 36.7 (30 Aug 2019 18:53)  T(F): 98.1 (30 Aug 2019 23:51), Max: 98.1 (30 Aug 2019 23:51)  HR: 83 (30 Aug 2019 23:51) (83 - 90)  BP: 112/60 (30 Aug 2019 23:51) (112/60 - 130/76)  BP(mean): --  RR: 15 (30 Aug 2019 23:51) (15 - 18)  SpO2: 100% (30 Aug 2019 23:51) (100% - 100%)    Physical Exam:   General: sitting comfortably in bed, NAD   CV: RR S1S2 no m/r/g  Lungs: unlabored  Back: No CVA tenderness  Abd: Soft, non-tender, non-distended.  Bowel sounds present.    :  No bleeding on pad.      Speculum Exam: Deferred  Ext: non-tender b/l, no edema     LABS:                              11.3   12.28 )-----------( 287      ( 30 Aug 2019 22:30 )             37.1         137  |  103  |  11  ----------------------------<  77  3.8   |  19<L>  |  0.58    Ca    9.5      30 Aug 2019 22:30    TPro  7.2  /  Alb  3.9  /  TBili  < 0.2<L>  /  DBili  x   /  AST  12  /  ALT  14  /  AlkPhos  39<L>      I&O's Detail      Urinalysis Basic - ( 30 Aug 2019 21:20 )    Color: LIGHT YELLOW / Appearance: Lt TURBID / S.023 / pH: 6.5  Gluc: NEGATIVE / Ketone: TRACE  / Bili: NEGATIVE / Urobili: NORMAL   Blood: NEGATIVE / Protein: 20 / Nitrite: NEGATIVE   Leuk Esterase: LARGE / RBC: 0-2 / WBC 11-25   Sq Epi: MODERATE / Non Sq Epi: x / Bacteria: FEW        RADIOLOGY & ADDITIONAL STUDIES:  < from: US Transvaginal (19 @ 01:58) >  IMPRESSION:  TRANSABDOMINAL ULTRASOUND:  Single viable intrauterine pregnancy  Estimated gestational age based on composite ultrasound measurements is   15 weeks 6 days +/- 8 days  Estimated date of delivery by ultrasound is 2020.    TRANSVAGINAL ULTRASOUND:  Internal os is closed.  Cervical length is 4.4 cm.  The placental edge is   approximately 7 mm from the internal os.  This can be reassessed during   third trimester.    < end of copied text > Backcharted Due to Patient Care  Patient seen at 10pm   AKILAH BEARDEN  28y  Female [id number]    HPI:  27 y/o  @ 15wks THERESA  presents for lower back pain that radiates to the abdomen which started tonight at physical therapy while stretching. Patient has a history of hydrocephalus and  shunt with 13 brain surgeries over the span of her life most recently in 2017. Patient is doing physical therapy for R sided neuropathy. She states that prior to pregnancy she was taking Lyrica and Flexeril for pain, however, now she has been going to pain management for shots, and taking Tylenol which doesn't help. Patient denies vaginal bleeding, loss of fluid, cramping abdominal pain.         Name of GYN Physician: Dr. Schilling    POB:  Denies    Pgyn: Denies fibroids, cysts, endometriosis, STI's, Abnormal pap smears     Home meds: PNV      Allergies    No Known Allergies      PAST MEDICAL & SURGICAL HISTORY:  Craniosynostosis  Meniere disease: Pt  discontinued   triamterene/HCTZ,  stopped March. Pt states MD aware  Obstructive hydrocephalus:  shunt placed at birth with multiple revisions for malfunction. LAST REVISION 2015  Strabismus  Personal History of Hydrocephalus (ICD9 V12.49)  S/P craniotomy: cranial vault expansion  Ventriculo-Peritoneal Shunt Status (ICD9 V45.2):  shunt placed at birth with multiple revisions 1990 2002 x2 2003 2007 2009 .      FAMILY HISTORY:  No pertinent family history      Social History:  Denies smoking use, drug use, alcohol use.       Vital Signs Last 24 Hrs  T(C): 36.7 (30 Aug 2019 23:51), Max: 36.7 (30 Aug 2019 18:53)  T(F): 98.1 (30 Aug 2019 23:51), Max: 98.1 (30 Aug 2019 23:51)  HR: 83 (30 Aug 2019 23:51) (83 - 90)  BP: 112/60 (30 Aug 2019 23:51) (112/60 - 130/76)  BP(mean): --  RR: 15 (30 Aug 2019 23:51) (15 - 18)  SpO2: 100% (30 Aug 2019 23:51) (100% - 100%)    Physical Exam:   General: sitting comfortably in bed, NAD   CV: RR S1S2 no m/r/g  Lungs: unlabored  Back: No CVA tenderness  Abd: Soft, non-tender, non-distended.  Bowel sounds present.    :  No bleeding on pad.      Speculum Exam: Deferred  Ext: non-tender b/l, no edema     LABS:                              11.3   12.28 )-----------( 287      ( 30 Aug 2019 22:30 )             37.1         137  |  103  |  11  ----------------------------<  77  3.8   |  19<L>  |  0.58    Ca    9.5      30 Aug 2019 22:30    TPro  7.2  /  Alb  3.9  /  TBili  < 0.2<L>  /  DBili  x   /  AST  12  /  ALT  14  /  AlkPhos  39<L>      I&O's Detail      Urinalysis Basic - ( 30 Aug 2019 21:20 )    Color: LIGHT YELLOW / Appearance: Lt TURBID / S.023 / pH: 6.5  Gluc: NEGATIVE / Ketone: TRACE  / Bili: NEGATIVE / Urobili: NORMAL   Blood: NEGATIVE / Protein: 20 / Nitrite: NEGATIVE   Leuk Esterase: LARGE / RBC: 0-2 / WBC 11-25   Sq Epi: MODERATE / Non Sq Epi: x / Bacteria: FEW        RADIOLOGY & ADDITIONAL STUDIES:  < from: US Transvaginal (19 @ 01:58) >  IMPRESSION:  TRANSABDOMINAL ULTRASOUND:  Single viable intrauterine pregnancy  Estimated gestational age based on composite ultrasound measurements is   15 weeks 6 days +/- 8 days  Estimated date of delivery by ultrasound is 2020.    TRANSVAGINAL ULTRASOUND:  Internal os is closed.  Cervical length is 4.4 cm.  The placental edge is   approximately 7 mm from the internal os.  This can be reassessed during   third trimester.    < end of copied text >

## 2019-08-30 NOTE — ED ADULT TRIAGE NOTE - CHIEF COMPLAINT QUOTE
15 weeks 3 days pregnant. THERESA 2/18/20. States she was at physical therapy today for neuropathy caused by hydrocephalus. States she had "shooting" pain in back that radiated through to abdomen. Spoke to Malathi in L&D and was told patient needs medical eval and OB can [place] paged at [phone] for fetal monitoring.

## 2019-08-30 NOTE — ED PROVIDER NOTE - PATIENT PORTAL LINK FT
You can access the FollowMyHealth Patient Portal offered by [facility] by registering at the following website: [email or web address]. By joining [facility]’s FollowMyHealth portal, you will also be able to view your health information using other applications (apps) compatible with our system.

## 2019-08-30 NOTE — ED PROVIDER NOTE - PROGRESS NOTE DETAILS
fetal heart rate - 138 via bedside US. JUSTIN Hurtado: Pt signed out to me by JUSTIN Arguello pending OB evaluation and lab results, being treated for UTI with Keflex JUSTIN Hurtado: Spoke with OB, requesting formal TVUS to measure cervical length. Pt ordered for TVUS. Pt signed out to JUSTIN Webb JUSTIN Webb Addendum:  This patient was signed out to me by JUSTIN Hurtado; pt pending TVUS; plan to follow up results and discuss with Ob/Gyn team who had advised study accordingly.  In the interim, no issues or c/o.  TVUS report states:  "IMPRESSION:  TRANSABDOMINAL ULTRASOUND: Single viable intrauterine pregnancy  Estimated gestational age based on composite ultrasound measurements is 15 weeks 6 days +/- 8 days  Estimated date of delivery by ultrasound is 02/16/2020.  TRANSVAGINAL ULTRASOUND:  Internal os is closed.  Cervical length is 4.4 cm.  The placental edge is approximately 7 mm from the internal os.  This can be reassessed during third trimester."  I spoke with covering Ob/Gyn resident Dr. Eid and discussed results; Dr. Eid advised that pt may be discharged home from Ob/Gyn perspective and advised that pt should f/u with Dr. Schilling as outpatient; Dr. Eid also advised not to treat with antibiotics, that current covering Ob/Gyn attending advised to hold off on antibiotics for now and follow up Urine Culture results and then treat if UCX results warrant tx.  Patient will be discharged home per below instructions.

## 2019-08-30 NOTE — ED PROVIDER NOTE - CLINICAL SUMMARY MEDICAL DECISION MAKING FREE TEXT BOX
28 year old female with a PMHx of hydrocephalus with  shunt pw lower back pain that radiates bilaterally to the pelvis today at 18:00.

## 2019-08-31 DIAGNOSIS — M54.9 DORSALGIA, UNSPECIFIED: ICD-10-CM

## 2019-08-31 PROCEDURE — 76830 TRANSVAGINAL US NON-OB: CPT | Mod: 26

## 2019-08-31 NOTE — ED ADULT NURSE NOTE - CHIEF COMPLAINT QUOTE
15 weeks 3 days pregnant. THERESA 2/18/20. States she was at physical therapy today for neuropathy caused by hydrocephalus. States she had "shooting" pain in back that radiated through to abdomen. Spoke to Malathi in L&D and was told patient needs medical eval and OB can be paged at [phone] for fetal monitoring.

## 2019-09-01 LAB
BACTERIA UR CULT: SIGNIFICANT CHANGE UP
SPECIMEN SOURCE: SIGNIFICANT CHANGE UP

## 2019-09-26 NOTE — ED ADULT NURSE NOTE - NS ED NOTE  TALK SOMEONE YN
Problem: Prexisting or High Potential for Compromised Skin Integrity  Goal: Skin integrity is maintained or improved  Description  INTERVENTIONS:  - Identify patients at risk for skin breakdown  - Assess and monitor skin integrity  - Assess and monitor nutrition and hydration status  - Monitor labs   - Assess for incontinence   - Turn and reposition patient  - Assist with mobility/ambulation  - Relieve pressure over bony prominences  - Avoid friction and shearing  - Provide appropriate hygiene as needed including keeping skin clean and dry  - Evaluate need for skin moisturizer/barrier cream  - Collaborate with interdisciplinary team   - Patient/family teaching  - Consider wound care consult   Outcome: Completed     Problem: PAIN - ADULT  Goal: Verbalizes/displays adequate comfort level or baseline comfort level  Description  Interventions:  - Encourage patient to monitor pain and request assistance  - Assess pain using appropriate pain scale  - Administer analgesics based on type and severity of pain and evaluate response  - Implement non-pharmacological measures as appropriate and evaluate response  - Consider cultural and social influences on pain and pain management  - Notify physician/advanced practitioner if interventions unsuccessful or patient reports new pain  Outcome: Completed     Problem: INFECTION - ADULT  Goal: Absence or prevention of progression during hospitalization  Description  INTERVENTIONS:  - Assess and monitor for signs and symptoms of infection  - Monitor lab/diagnostic results  - Monitor all insertion sites, i e  indwelling lines, tubes, and drains  - Monitor endotracheal if appropriate and nasal secretions for changes in amount and color  - [place] appropriate cooling/warming therapies per order  - Administer medications as ordered  - Instruct and encourage patient and family to use good hand hygiene technique  - Identify and instruct in appropriate isolation precautions for identified infection/condition  Outcome: Completed     Problem: SAFETY ADULT  Goal: Patient will remain free of falls  Description  INTERVENTIONS:  - Assess patient frequently for physical needs  -  Identify cognitive and physical deficits and behaviors that affect risk of falls    -  [place] fall precautions as indicated by assessment   - Educate patient/family on patient safety including physical limitations  - Instruct patient to call for assistance with activity based on assessment  - Modify environment to reduce risk of injury  - Consider OT/PT consult to assist with strengthening/mobility  Outcome: Completed  Goal: Maintain or return to baseline ADL function  Description  INTERVENTIONS:  -  Assess patient's ability to carry out ADLs; assess patient's baseline for ADL function and identify physical deficits which impact ability to perform ADLs (bathing, care of mouth/teeth, toileting, grooming, dressing, etc )  - Assess/evaluate cause of self-care deficits   - Assess range of motion  - Assess patient's mobility; develop plan if impaired  - Assess patient's need for assistive devices and provide as appropriate  - Encourage maximum independence but intervene and supervise when necessary  - Involve family in performance of ADLs  - Assess for home care needs following discharge   - Consider OT consult to assist with ADL evaluation and planning for discharge  - Provide patient education as appropriate  Outcome: Completed  Goal: Maintain or return mobility status to optimal level  Description  INTERVENTIONS:  - Assess patient's baseline mobility status (ambulation, transfers, stairs, etc )    - Identify cognitive and physical deficits and behaviors that affect mobility  - Identify mobility aids required to assist with transfers and/or ambulation (gait belt, sit-to-stand, lift, walker, cane, etc )  - [place] fall precautions as indicated by assessment  - Record patient progress and toleration of activity level on Mobility SBAR; progress patient to next Phase/Stage  - Instruct patient to call for assistance with activity based on assessment  - Consider rehabilitation consult to assist with strengthening/weightbearing, etc   Outcome: Completed     Problem: DISCHARGE PLANNING  Goal: Discharge to home or other facility with appropriate resources  Description  INTERVENTIONS:  - Identify barriers to discharge w/patient and caregiver  - Arrange for needed discharge resources and transportation as appropriate  - Identify discharge learning needs (meds, wound care, etc )  - Arrange for interpretive services to assist at discharge as needed  - Refer to Case Management Department for coordinating discharge planning if the patient needs post-hospital services based on physician/advanced practitioner order or complex needs related to functional status, cognitive ability, or social support system  Outcome: Completed     Problem: Knowledge Deficit  Goal: Patient/family/caregiver demonstrates understanding of disease process, treatment plan, medications, and discharge instructions  Description  Complete learning assessment and assess knowledge base  Interventions:  - Provide teaching at level of understanding  - Provide teaching via preferred learning methods  Outcome: Completed     Problem: Potential for Falls  Goal: Patient will remain free of falls  Description  INTERVENTIONS:  - Assess patient frequently for physical needs  -  Identify cognitive and physical deficits and behaviors that affect risk of falls    -  [place] fall precautions as indicated by assessment   - Educate patient/family on patient safety including physical limitations  - Instruct patient to call for assistance with activity based on assessment  - Modify environment to reduce risk of injury  - Consider OT/PT consult to assist with strengthening/mobility  Outcome: Completed No

## 2019-09-27 NOTE — H&P ADULT. - NS ABD PE RECTAL EXAM
[Fatigue] : fatigue [Vomiting] : vomiting [Headache] : headache [Dizziness] : dizziness [Negative] : Psychiatric [Nausea] : nausea [Fever] : no fever [Chills] : no chills [Constipation] : no constipation [Abdominal Pain] : no abdominal pain [Fainting] : no fainting [Diarrhea] : diarrhea [FreeTextEntry3] : see HPI not examined

## 2019-11-02 ENCOUNTER — APPOINTMENT (OUTPATIENT)
Dept: OBGYN | Facility: CLINIC | Age: 29
End: 2019-11-02
Payer: MEDICAID

## 2019-11-02 PROCEDURE — 0502F SUBSEQUENT PRENATAL CARE: CPT

## 2019-11-16 ENCOUNTER — APPOINTMENT (OUTPATIENT)
Dept: OBGYN | Facility: CLINIC | Age: 29
End: 2019-11-16
Payer: MEDICAID

## 2019-11-16 PROCEDURE — 0502F SUBSEQUENT PRENATAL CARE: CPT

## 2019-12-07 ENCOUNTER — APPOINTMENT (OUTPATIENT)
Dept: ANTEPARTUM | Facility: CLINIC | Age: 29
End: 2019-12-07
Payer: MEDICAID

## 2019-12-07 PROCEDURE — 76819 FETAL BIOPHYS PROFIL W/O NST: CPT

## 2019-12-07 PROCEDURE — 76816 OB US FOLLOW-UP PER FETUS: CPT

## 2019-12-21 ENCOUNTER — APPOINTMENT (OUTPATIENT)
Dept: OBGYN | Facility: CLINIC | Age: 29
End: 2019-12-21
Payer: MEDICAID

## 2019-12-21 PROCEDURE — 0502F SUBSEQUENT PRENATAL CARE: CPT

## 2020-01-04 ENCOUNTER — APPOINTMENT (OUTPATIENT)
Dept: OBGYN | Facility: CLINIC | Age: 30
End: 2020-01-04
Payer: MEDICAID

## 2020-01-04 PROCEDURE — 0502F SUBSEQUENT PRENATAL CARE: CPT

## 2020-01-18 ENCOUNTER — APPOINTMENT (OUTPATIENT)
Dept: OBGYN | Facility: CLINIC | Age: 30
End: 2020-01-18

## 2020-01-28 ENCOUNTER — APPOINTMENT (OUTPATIENT)
Dept: OBGYN | Facility: CLINIC | Age: 30
End: 2020-01-28

## 2020-02-03 ENCOUNTER — APPOINTMENT (OUTPATIENT)
Dept: ANTEPARTUM | Facility: CLINIC | Age: 30
End: 2020-02-03
Payer: MEDICAID

## 2020-02-03 PROCEDURE — 76816 OB US FOLLOW-UP PER FETUS: CPT

## 2020-02-03 PROCEDURE — 76819 FETAL BIOPHYS PROFIL W/O NST: CPT

## 2020-02-05 ENCOUNTER — INPATIENT (INPATIENT)
Facility: HOSPITAL | Age: 30
LOS: 4 days | Discharge: HOME | End: 2020-02-10
Attending: OBSTETRICS & GYNECOLOGY | Admitting: OBSTETRICS & GYNECOLOGY
Payer: MEDICAID

## 2020-02-05 ENCOUNTER — TRANSCRIPTION ENCOUNTER (OUTPATIENT)
Age: 30
End: 2020-02-05

## 2020-02-05 ENCOUNTER — OUTPATIENT (OUTPATIENT)
Dept: INPATIENT UNIT | Facility: HOSPITAL | Age: 30
LOS: 1 days | Discharge: HOME | End: 2020-02-05
Payer: MEDICAID

## 2020-02-05 VITALS
HEART RATE: 104 BPM | DIASTOLIC BLOOD PRESSURE: 77 MMHG | RESPIRATION RATE: 18 BRPM | TEMPERATURE: 99 F | SYSTOLIC BLOOD PRESSURE: 133 MMHG

## 2020-02-05 VITALS — HEART RATE: 98 BPM | DIASTOLIC BLOOD PRESSURE: 79 MMHG | SYSTOLIC BLOOD PRESSURE: 124 MMHG

## 2020-02-05 VITALS — TEMPERATURE: 98 F

## 2020-02-05 DIAGNOSIS — O26.899 OTHER SPECIFIED PREGNANCY RELATED CONDITIONS, UNSPECIFIED TRIMESTER: ICD-10-CM

## 2020-02-05 DIAGNOSIS — Z98.890 OTHER SPECIFIED POSTPROCEDURAL STATES: Chronic | ICD-10-CM

## 2020-02-05 DIAGNOSIS — Z3A.00 WEEKS OF GESTATION OF PREGNANCY NOT SPECIFIED: ICD-10-CM

## 2020-02-05 LAB
BASOPHILS # BLD AUTO: 0.01 K/UL — SIGNIFICANT CHANGE UP (ref 0–0.2)
BASOPHILS NFR BLD AUTO: 0.1 % — SIGNIFICANT CHANGE UP (ref 0–2)
BLD GP AB SCN SERPL QL: NEGATIVE — SIGNIFICANT CHANGE UP
EOSINOPHIL # BLD AUTO: 0 K/UL — SIGNIFICANT CHANGE UP (ref 0–0.5)
EOSINOPHIL NFR BLD AUTO: 0 % — SIGNIFICANT CHANGE UP (ref 0–6)
HCT VFR BLD CALC: 32.6 % — LOW (ref 34.5–45)
HGB BLD-MCNC: 10.5 G/DL — LOW (ref 11.5–15.5)
IMM GRANULOCYTES NFR BLD AUTO: 0.5 % — SIGNIFICANT CHANGE UP (ref 0–1.5)
LYMPHOCYTES # BLD AUTO: 1.47 K/UL — SIGNIFICANT CHANGE UP (ref 1–3.3)
LYMPHOCYTES # BLD AUTO: 8.3 % — LOW (ref 13–44)
MCHC RBC-ENTMCNC: 24.2 PG — LOW (ref 27–34)
MCHC RBC-ENTMCNC: 32.2 % — SIGNIFICANT CHANGE UP (ref 32–36)
MCV RBC AUTO: 75.1 FL — LOW (ref 80–100)
MONOCYTES # BLD AUTO: 0.93 K/UL — HIGH (ref 0–0.9)
MONOCYTES NFR BLD AUTO: 5.2 % — SIGNIFICANT CHANGE UP (ref 2–14)
NEUTROPHILS # BLD AUTO: 15.25 K/UL — HIGH (ref 1.8–7.4)
NEUTROPHILS NFR BLD AUTO: 85.9 % — HIGH (ref 43–77)
NRBC # FLD: 0 K/UL — SIGNIFICANT CHANGE UP (ref 0–0)
PLATELET # BLD AUTO: 293 K/UL — SIGNIFICANT CHANGE UP (ref 150–400)
PMV BLD: 10.7 FL — SIGNIFICANT CHANGE UP (ref 7–13)
RBC # BLD: 4.34 M/UL — SIGNIFICANT CHANGE UP (ref 3.8–5.2)
RBC # FLD: 13.8 % — SIGNIFICANT CHANGE UP (ref 10.3–14.5)
RH IG SCN BLD-IMP: POSITIVE — SIGNIFICANT CHANGE UP
WBC # BLD: 17.75 K/UL — HIGH (ref 3.8–10.5)
WBC # FLD AUTO: 17.75 K/UL — HIGH (ref 3.8–10.5)

## 2020-02-05 PROCEDURE — 76818 FETAL BIOPHYS PROFILE W/NST: CPT | Mod: 26

## 2020-02-05 PROCEDURE — 99213 OFFICE O/P EST LOW 20 MIN: CPT | Mod: 25

## 2020-02-05 RX ORDER — SODIUM CHLORIDE 9 MG/ML
1000 INJECTION, SOLUTION INTRAVENOUS
Refills: 0 | Status: DISCONTINUED | OUTPATIENT
Start: 2020-02-05 | End: 2020-02-06

## 2020-02-05 RX ORDER — INFLUENZA VIRUS VACCINE 15; 15; 15; 15 UG/.5ML; UG/.5ML; UG/.5ML; UG/.5ML
0.5 SUSPENSION INTRAMUSCULAR ONCE
Refills: 0 | Status: DISCONTINUED | OUTPATIENT
Start: 2020-02-05 | End: 2020-02-10

## 2020-02-05 RX ORDER — TOPIRAMATE 25 MG
3 TABLET ORAL
Qty: 0 | Refills: 0 | DISCHARGE

## 2020-02-05 RX ORDER — NABUMETONE 750 MG
1 TABLET ORAL
Qty: 0 | Refills: 0 | DISCHARGE

## 2020-02-05 RX ORDER — BENZOYL PEROXIDE MICRONIZED 5.8 %
1 TOWELETTE (EA) TOPICAL
Qty: 0 | Refills: 0 | DISCHARGE

## 2020-02-05 RX ORDER — NARATRIPTAN HYDROCHLORIDE 1 MG/1
1 TABLET, FILM COATED ORAL
Qty: 0 | Refills: 0 | DISCHARGE

## 2020-02-05 RX ORDER — OXYTOCIN 10 UNIT/ML
333.33 VIAL (ML) INJECTION
Qty: 20 | Refills: 0 | Status: DISCONTINUED | OUTPATIENT
Start: 2020-02-05 | End: 2020-02-06

## 2020-02-05 RX ORDER — OXYTOCIN 10 UNIT/ML
2 VIAL (ML) INJECTION
Qty: 30 | Refills: 0 | Status: DISCONTINUED | OUTPATIENT
Start: 2020-02-05 | End: 2020-02-06

## 2020-02-05 RX ORDER — SODIUM CHLORIDE 9 MG/ML
1000 INJECTION, SOLUTION INTRAVENOUS ONCE
Refills: 0 | Status: COMPLETED | OUTPATIENT
Start: 2020-02-05 | End: 2020-02-05

## 2020-02-05 RX ORDER — MILK THISTLE 150 MG
2 CAPSULE ORAL
Qty: 0 | Refills: 0 | DISCHARGE

## 2020-02-05 RX ORDER — PYRIDOXINE HCL (VITAMIN B6) 100 MG
1 TABLET ORAL
Qty: 0 | Refills: 0 | DISCHARGE

## 2020-02-05 RX ADMIN — SODIUM CHLORIDE 125 MILLILITER(S): 9 INJECTION, SOLUTION INTRAVENOUS at 20:09

## 2020-02-05 RX ADMIN — SODIUM CHLORIDE 600 MILLILITER(S): 9 INJECTION INTRAMUSCULAR; INTRAVENOUS; SUBCUTANEOUS at 23:58

## 2020-02-05 RX ADMIN — SODIUM CHLORIDE 2000 MILLILITER(S): 9 INJECTION, SOLUTION INTRAVENOUS at 19:20

## 2020-02-05 NOTE — OB PROVIDER H&P - HISTORY OF PRESENT ILLNESS
30 yo Black female  @ 38.1 wks GA presents for c/o ctx more frequently becoming very uncomfortable and requesting pain meds at this time.  Denies any VB, LOF, and reports Good FM's  PNC: Dr Schilling  [facility] [place]: Detailed Maternal HX  Craniosynostosis    Meniere disease  Pt  discontinued   triamterene/HCTZ,  stopped March. Pt states MD aware  Obstructive hydrocephalus   shunt placed at birth with multiple revisions for malfunction. LAST REVISION 2015  Personal History of Hydrocephalus (ICD9 V12.49)    Strabismus  H/O eye surgery  Repair of lazy eyes   S/P craniotomy  cranial vault expansion  Ventriculo-Peritoneal Shunt Status (ICD9 V45.2)   shunt placed at birth with multiple revisions 1990 2002 x2 2003 2004 2007 2009 .  Denies any current Meds  NKDA  GBS reported Negative  Anesthesia consult on File and in Chart with Neurologists contact information

## 2020-02-05 NOTE — OB PROVIDER IHI INDUCTION/AUGMENTATION NOTE - NS_OBIHIINDICATION_OBGYN_ALL_OB
Failure to descend/Failureto dilate  Failure to descend  Inadequate uterine contraction/Failure to dilate/Inadequate uterine contraction

## 2020-02-05 NOTE — OB RN PATIENT PROFILE - CURRENT PREGNANCY COMPLICATIONS, OB PROFILE
Shunt and multiple maternal Surgeries and HX of Chiari malformation, craniosynotosis, and  hydrocephalus/Other/Maternal Medical Condition

## 2020-02-05 NOTE — OB PROVIDER TRIAGE NOTE - NS_MATERNALCONCERNS_OBGYN_ALL_OB_FT
Shunt and multiple maternal Surgeries and  Shunt  HX of Chiari malformation, craniosynotosis, and  hydrocephalus  Shunt  H/O eye surgery  Repair of lazy eyes 1995  S/P craniotomy  cranial vault expansion  Ventriculo-Peritoneal Shunt Status (ICD9 V45.2)   shunt placed at birth with multiple revisions 1990 2002 x2 2003 2004 2007 2009 2011 2015 .  Anesthesia consult was obtained prior  Patient's neurologist (Dr Bowers) cleared patient for vaginal delivery

## 2020-02-05 NOTE — OB PROVIDER TRIAGE NOTE - HISTORY OF PRESENT ILLNESS
30 yo Black female  @ 38.1 wks GA presents for c/o ctx more frequently becoming very uncomfortable and requesting pain meds at this time.  Denies any VB, LOF, and reports Good FM's  [place]: Dr Schilling  [facility] [place]  Craniosynostosis    Meniere disease  Pt  discontinued   triamterene/HCTZ,  stopped March. Pt states MD aware  Obstructive hydrocephalus   shunt placed at birth with multiple revisions for malfunction. LAST REVISION 2015  Personal History of Hydrocephalus (ICD9 V12.49)    Strabismus  H/O eye surgery  Repair of lazy eyes   S/P craniotomy  cranial vault expansion  Ventriculo-Peritoneal Shunt Status (ICD9 V45.2)   shunt placed at birth with multiple revisions 1990 2002 x2 2003 2004 2007 2009 .  Denies any current Meds  NKDA  GBS reported Negative  Anesthesia consult on File

## 2020-02-05 NOTE — OB PROVIDER H&P - NSHPPHYSICALEXAM_GEN_ALL_CORE
A/O x 3  NAD  ICU Vital Signs Last 24 Hrs  T(C): 37.1 (05 Feb 2020 17:35), Max: 37.1 (05 Feb 2020 17:35)  T(F): 98.8 (05 Feb 2020 17:35), Max: 98.8 (05 Feb 2020 17:35)  HR: 104 (05 Feb 2020 17:49) (85 - 104)  BP: 133/77 (05 Feb 2020 17:49) (118/62 - 133/77)  BP(mean): --  ABP: --  ABP(mean): --  RR: 18 (05 Feb 2020 17:35) (16 - 18)  SpO2: --  Heart: RRR  Lungs: BCTA  Abdomen is soft NT and gravid with moderate ctx palpable and good resting tone  SVE: 5/80/-2 Intact  FHR (145 baseline, mod variability, accels, no decels)  TOCO: Irregular ctx , palpable ctx approx. q 2-3 minutes  TOCO - To Be adjusted  Anesthesia consult from 12/11/220 on chart (approved for Epidural)

## 2020-02-05 NOTE — OB PROVIDER H&P - CURRENT PREGNANCY COMPLICATIONS, OB PROFILE
Maternal Medical Condition/Other/ Shunt and multiple maternal Surgeries and HX of Chiari malformation, craniosynotosis, and  hydrocephalus

## 2020-02-05 NOTE — OB PROVIDER TRIAGE NOTE - CURRENT PREGNANCY COMPLICATIONS, OB PROFILE
Maternal Medical Condition/ Shunt and multiple maternal Surgeries and HX of Chiari malformation, craniosynotosis, and  hydrocephalus/Other

## 2020-02-05 NOTE — OB PROVIDER TRIAGE NOTE - ADDITIONAL INSTRUCTIONS
-OB F/u as scheduled  - Labor precautions  -Fetal kick counts  - Return if symptoms of labor progress

## 2020-02-05 NOTE — OB PROVIDER TRIAGE NOTE - NSHPPHYSICALEXAM_GEN_ALL_CORE
A/O x 3  NAD  Vital Signs Last 24 Hrs  T(C): 36.9 (05 Feb 2020 06:27), Max: 36.9 (05 Feb 2020 06:23)  T(F): 98.4 (05 Feb 2020 06:27), Max: 98.42 (05 Feb 2020 06:23)  HR: 93 (05 Feb 2020 06:30) (93 - 93)  BP: 132/75 (05 Feb 2020 06:30) (132/75 - 132/75)  BP(mean): --  RR: 16 (05 Feb 2020 06:27) (16 - 16)  SpO2: --  Heart: RRR  Lungs: BCTA  Abdomen is soft NT and gravid with moderate ctx palpable and good resting tone  SVE: 5/80/-2 Intact  FHR (145 baseline, mod variability, accels, no decels)  TOCO: Irregular ctx , palpable ctx approx q 2-3 minutes  TOCO - To Be adjusted  Anesthesia consult from 12/11/220 on chart (approved for Epidural)

## 2020-02-05 NOTE — OB PROVIDER TRIAGE NOTE - NSOBPROVIDERNOTE_OBGYN_ALL_OB_FT
NOTE 28 yo Black female  @ 38.1 wks GA with no evidence of active Labor at this time.  - Plan for Discharge home with strict labor precautions and OB F/U as scheduled  - Fetal Kick Counts  - A/P DW Dr Jamison (OB Covering Attending) 30 yo Black female  @ 38.1 wks GA with no evidence of active Labor at this time. 30 yo Black female  @ 38.1 wks GA with no evidence of active Labor at this time.    @ 0920 NST Reviewed  FHR: Cat 1 (130 baseline, mod variability, accels, no decels)  TOCO: CTX approx... q 6 minutes    - Will DW Dr Jamison (OB Covering Attending)  - Plan for discharge home with strict labor precautions and OB F/U as scheduled  - Patient to return if labor symptoms progress or pain management requested 30 yo Black female  @ 38.1 wks GA with no evidence of active Labor at this time.    @ 0920 NST Reviewed with Dr Jamison (OB Covering Attending) who approved FHRT at this time  FHR: Cat 1 (130 baseline, mod variability, accels, no decels)  TOCO: CTX approx... q 6 minutes    -l DW Dr Jamison (OB Covering Attending)  - Plan for discharge home with strict labor precautions and OB F/U as scheduled  - Patient to return if labor symptoms progress or pain management requested  - Patient verbalized  understanding

## 2020-02-05 NOTE — OB PROVIDER TRIAGE NOTE - NS_MATERNALCONCERNS_OBGYN_ALL_OB_FT
Shunt  H/O eye surgery  Repair of lazy eyes 1995  S/P craniotomy  cranial vault expansion  Ventriculo-Peritoneal Shunt Status (ICD9 V45.2)   shunt placed at birth with multiple revisions 1990 2002 x2 2003 2004 2007 2009 2011 2015 .  Anesthesia consult was obtained prior  Patient's neurologist (Dr Bowers) cleared patient for vaginal delivery

## 2020-02-05 NOTE — OB PROVIDER TRIAGE NOTE - NSHPPHYSICALEXAM_GEN_ALL_CORE
A/O x 3  NAD  Vital Signs Last 24 Hrs  T(C): 36.9 (05 Feb 2020 06:27), Max: 36.9 (05 Feb 2020 06:23)  T(F): 98.4 (05 Feb 2020 06:27), Max: 98.42 (05 Feb 2020 06:23)  HR: 93 (05 Feb 2020 06:30) (93 - 93)  BP: 132/75 (05 Feb 2020 06:30) (132/75 - 132/75)  BP(mean): --  RR: 16 (05 Feb 2020 06:27) (16 - 16)  SpO2: --  Heart: RRR  Lungs: BCTA  Abdomen is soft NT and gravid with no palpable ctx  FHR Cat 1, 140 baseline, mod variability,   TOCO: Irreg ctx approx q 5-10  SVE: 1/50/-3  Transabdominal sono performed at the bedside: VTX, CATHY @ 15, BPP 8/8 A/O x 3  NAD  Vital Signs Last 24 Hrs  T(C): 36.9 (05 Feb 2020 06:27), Max: 36.9 (05 Feb 2020 06:23)  T(F): 98.4 (05 Feb 2020 06:27), Max: 98.42 (05 Feb 2020 06:23)  HR: 93 (05 Feb 2020 06:30) (93 - 93)  BP: 132/75 (05 Feb 2020 06:30) (132/75 - 132/75)  BP(mean): --  RR: 16 (05 Feb 2020 06:27) (16 - 16)  SpO2: --  Heart: RRR  Lungs: BCTA  Abdomen is soft NT and gravid with no palpable ctx  FHR Cat 1, 140 baseline, mod variability, accels, no decels)  TOCO: Irreg ctx approx q 5-10  SVE: 1/50/-3  Transabdominal sono performed at the bedside: VTX, CATHY @ 15, BPP 8/8 A/O x 3  NAD  Vital Signs Last 24 Hrs  T(C): 36.9 (05 Feb 2020 06:27), Max: 36.9 (05 Feb 2020 06:23)  T(F): 98.4 (05 Feb 2020 06:27), Max: 98.42 (05 Feb 2020 06:23)  HR: 93 (05 Feb 2020 06:30) (93 - 93)  BP: 132/75 (05 Feb 2020 06:30) (132/75 - 132/75)  BP(mean): --  RR: 16 (05 Feb 2020 06:27) (16 - 16)  SpO2: --  Heart: RRR  Lungs: BCTA  Abdomen is soft NT and gravid with no palpable ctx  FHR, (140 baseline, mod variability, accels, intermittent variables)  TOCO: Irreg ctx approx. q 5-10  SVE: 1/50/-3  Transabdominal sono performed at the bedside: VTX, CATHY @ 15, BPP 8/8 A/O x 3  NAD  Vital Signs Last 24 Hrs  T(C): 36.9 (05 Feb 2020 06:27), Max: 36.9 (05 Feb 2020 06:23)  T(F): 98.4 (05 Feb 2020 06:27), Max: 98.42 (05 Feb 2020 06:23)  HR: 93 (05 Feb 2020 06:30) (93 - 93)  BP: 132/75 (05 Feb 2020 06:30) (132/75 - 132/75)  BP(mean): --  RR: 16 (05 Feb 2020 06:27) (16 - 16)  SpO2: --  Heart: RRR  Lungs: BCTA  Abdomen is soft NT and gravid with no palpable ctx  FHR, (140 baseline, mod variability, accels, intermittent variables)  TOCO: Irreg ctx approx. q 5-10  SVE: 1/50/-3  Transabdominal sono performed at the bedside: VTX, CATHY @ 15, BPP 8/8  For continued observation and monitoring of NST

## 2020-02-05 NOTE — OB PROVIDER H&P - ASSESSMENT
30 yo Black female  @ 38.1 wks GA presents in active Labor  - Admit to LDR  - Routine admission Orders  - Anesthesia notified (Consult on Chart)  - Epidural prn  - A/P DW Dr Jamison (OB Covering Attending)

## 2020-02-05 NOTE — OB PROVIDER TRIAGE NOTE - PLAN OF CARE
Labor precautions  Fetal kick counts  Return when labor symptoms progress or pain management requested

## 2020-02-05 NOTE — OB RN TRIAGE NOTE - PSH
H/O eye surgery  Repair of lazy eyes 1995  S/P craniotomy  cranial vault expansion  Ventriculo-Peritoneal Shunt Status (ICD9 V45.2)   shunt placed at birth with multiple revisions 1990 2002 x2 2003 2004 2007 2009 2011 2015 .

## 2020-02-05 NOTE — OB RN TRIAGE NOTE - PSH
H/O eye surgery  Repair of lazy eyes 1995  S/P craniotomy  cranial vault expansion  Ventriculo-Peritoneal Shunt Status (ICD9 V45.2)   shunt placed at birth with multiple revisions 1990 2002 x2 2003 2004 2007 2009 2011 2015 . H/O eye surgery  Repair of lazy eyes 1995  S/P craniotomy  cranial vault expansion  Ventriculo-Peritoneal Shunt Status (ICD9 V45.2)   shunt placed at birth with multiple revisions 1990 2002 x2 2003 2004 2007 2009 2011 2015 2016,2017

## 2020-02-05 NOTE — OB PROVIDER TRIAGE NOTE - HISTORY OF PRESENT ILLNESS
30 yo Black female  @ 38.1 wks GA presents for c/o ctx q 5-10 minutes. Denies any VB, LOF, and reports Good FM's  PNC: Dr Schilling  [facility] [place]: Detailed Maternal HX  Craniosynostosis    Meniere disease  Pt  discontinued   triamterene/HCTZ,  stopped March. Pt states MD aware  Obstructive hydrocephalus   shunt placed at birth with multiple revisions for malfunction. LAST REVISION 2015  Personal History of Hydrocephalus (ICD9 V12.49)    Strabismus  H/O eye surgery  Repair of lazy eyes   S/P craniotomy  cranial vault expansion  Ventriculo-Peritoneal Shunt Status (ICD9 V45.2)   shunt placed at birth with multiple revisions 1990 2002 x2 2003 2004 2007 2009 .  Denies any current Meds  NKDA  GBS reported Negative

## 2020-02-05 NOTE — OB PROVIDER TRIAGE NOTE - NSOBPROVIDERNOTE_OBGYN_ALL_OB_FT
28 yo Black female  @ 38.1 wks GA returns in Active Labor  - For Admission to LDR and labor mangement  -See routine admission orders  - Anesthesia consult and Dr Hugo aware  - Dr Jamison (OB Covering Attending aware)

## 2020-02-05 NOTE — OB RN TRIAGE NOTE - PMH
Craniosynostosis    Meniere disease  Pt  discontinued   triamterene/HCTZ,  stopped March. Pt states MD aware  Obstructive hydrocephalus   shunt placed at birth with multiple revisions for malfunction. LAST REVISION 02/2015  Personal History of Hydrocephalus (ICD9 V12.49)    Strabismus Craniosynostosis    Neuropathy  R side of the body  Obstructive hydrocephalus   shunt placed at birth with multiple revisions for malfunction. LAST REVISION 02/2015  Personal History of Hydrocephalus (ICD9 V12.49)    Strabismus    Vertigo

## 2020-02-06 ENCOUNTER — RESULT REVIEW (OUTPATIENT)
Age: 30
End: 2020-02-06

## 2020-02-06 LAB — T PALLIDUM AB TITR SER: NEGATIVE — SIGNIFICANT CHANGE UP

## 2020-02-06 PROCEDURE — 88307 TISSUE EXAM BY PATHOLOGIST: CPT | Mod: 26

## 2020-02-06 PROCEDURE — 59515 CESAREAN DELIVERY: CPT | Mod: U7

## 2020-02-06 RX ORDER — AMPICILLIN TRIHYDRATE 250 MG
2 CAPSULE ORAL EVERY 6 HOURS
Refills: 0 | Status: DISCONTINUED | OUTPATIENT
Start: 2020-02-06 | End: 2020-02-06

## 2020-02-06 RX ORDER — FERROUS SULFATE 325(65) MG
325 TABLET ORAL DAILY
Refills: 0 | Status: DISCONTINUED | OUTPATIENT
Start: 2020-02-06 | End: 2020-02-07

## 2020-02-06 RX ORDER — IBUPROFEN 200 MG
600 TABLET ORAL EVERY 6 HOURS
Refills: 0 | Status: DISCONTINUED | OUTPATIENT
Start: 2020-02-06 | End: 2020-02-10

## 2020-02-06 RX ORDER — GLYCERIN ADULT
1 SUPPOSITORY, RECTAL RECTAL AT BEDTIME
Refills: 0 | Status: DISCONTINUED | OUTPATIENT
Start: 2020-02-06 | End: 2020-02-10

## 2020-02-06 RX ORDER — FOLIC ACID 0.8 MG
1 TABLET ORAL DAILY
Refills: 0 | Status: DISCONTINUED | OUTPATIENT
Start: 2020-02-06 | End: 2020-02-10

## 2020-02-06 RX ORDER — OXYTOCIN 10 UNIT/ML
333.33 VIAL (ML) INJECTION
Qty: 20 | Refills: 0 | Status: DISCONTINUED | OUTPATIENT
Start: 2020-02-06 | End: 2020-02-06

## 2020-02-06 RX ORDER — FAMOTIDINE 10 MG/ML
20 INJECTION INTRAVENOUS ONCE
Refills: 0 | Status: COMPLETED | OUTPATIENT
Start: 2020-02-06 | End: 2020-02-06

## 2020-02-06 RX ORDER — SODIUM CHLORIDE 9 MG/ML
1000 INJECTION INTRAMUSCULAR; INTRAVENOUS; SUBCUTANEOUS
Refills: 0 | Status: DISCONTINUED | OUTPATIENT
Start: 2020-02-06 | End: 2020-02-06

## 2020-02-06 RX ORDER — SIMETHICONE 80 MG/1
80 TABLET, CHEWABLE ORAL EVERY 4 HOURS
Refills: 0 | Status: DISCONTINUED | OUTPATIENT
Start: 2020-02-06 | End: 2020-02-10

## 2020-02-06 RX ORDER — ERTAPENEM SODIUM 1 G/1
1000 INJECTION, POWDER, LYOPHILIZED, FOR SOLUTION INTRAMUSCULAR; INTRAVENOUS EVERY 24 HOURS
Refills: 0 | Status: COMPLETED | OUTPATIENT
Start: 2020-02-06 | End: 2020-02-07

## 2020-02-06 RX ORDER — METOCLOPRAMIDE HCL 10 MG
10 TABLET ORAL ONCE
Refills: 0 | Status: COMPLETED | OUTPATIENT
Start: 2020-02-06 | End: 2020-02-06

## 2020-02-06 RX ORDER — SODIUM CHLORIDE 9 MG/ML
300 INJECTION INTRAMUSCULAR; INTRAVENOUS; SUBCUTANEOUS ONCE
Refills: 0 | Status: COMPLETED | OUTPATIENT
Start: 2020-02-06 | End: 2020-02-05

## 2020-02-06 RX ORDER — ACETAMINOPHEN 500 MG
1000 TABLET ORAL EVERY 6 HOURS
Refills: 0 | Status: COMPLETED | OUTPATIENT
Start: 2020-02-06 | End: 2020-02-07

## 2020-02-06 RX ORDER — ACETAMINOPHEN 500 MG
975 TABLET ORAL
Refills: 0 | Status: DISCONTINUED | OUTPATIENT
Start: 2020-02-06 | End: 2020-02-10

## 2020-02-06 RX ORDER — LANOLIN
1 OINTMENT (GRAM) TOPICAL EVERY 6 HOURS
Refills: 0 | Status: DISCONTINUED | OUTPATIENT
Start: 2020-02-06 | End: 2020-02-10

## 2020-02-06 RX ORDER — AMPICILLIN TRIHYDRATE 250 MG
CAPSULE ORAL
Refills: 0 | Status: DISCONTINUED | OUTPATIENT
Start: 2020-02-06 | End: 2020-02-06

## 2020-02-06 RX ORDER — DIPHENHYDRAMINE HCL 50 MG
25 CAPSULE ORAL EVERY 6 HOURS
Refills: 0 | Status: DISCONTINUED | OUTPATIENT
Start: 2020-02-06 | End: 2020-02-10

## 2020-02-06 RX ORDER — OXYCODONE HYDROCHLORIDE 5 MG/1
5 TABLET ORAL
Refills: 0 | Status: COMPLETED | OUTPATIENT
Start: 2020-02-06 | End: 2020-02-13

## 2020-02-06 RX ORDER — SODIUM CHLORIDE 9 MG/ML
1000 INJECTION, SOLUTION INTRAVENOUS
Refills: 0 | Status: DISCONTINUED | OUTPATIENT
Start: 2020-02-06 | End: 2020-02-06

## 2020-02-06 RX ORDER — TETANUS TOXOID, REDUCED DIPHTHERIA TOXOID AND ACELLULAR PERTUSSIS VACCINE, ADSORBED 5; 2.5; 8; 8; 2.5 [IU]/.5ML; [IU]/.5ML; UG/.5ML; UG/.5ML; UG/.5ML
0.5 SUSPENSION INTRAMUSCULAR ONCE
Refills: 0 | Status: DISCONTINUED | OUTPATIENT
Start: 2020-02-06 | End: 2020-02-10

## 2020-02-06 RX ORDER — ONDANSETRON 8 MG/1
4 TABLET, FILM COATED ORAL EVERY 6 HOURS
Refills: 0 | Status: DISCONTINUED | OUTPATIENT
Start: 2020-02-06 | End: 2020-02-07

## 2020-02-06 RX ORDER — AMPICILLIN TRIHYDRATE 250 MG
2 CAPSULE ORAL ONCE
Refills: 0 | Status: COMPLETED | OUTPATIENT
Start: 2020-02-06 | End: 2020-02-06

## 2020-02-06 RX ORDER — OXYCODONE HYDROCHLORIDE 5 MG/1
5 TABLET ORAL
Refills: 0 | Status: DISCONTINUED | OUTPATIENT
Start: 2020-02-06 | End: 2020-02-10

## 2020-02-06 RX ORDER — HEPARIN SODIUM 5000 [USP'U]/ML
5000 INJECTION INTRAVENOUS; SUBCUTANEOUS EVERY 12 HOURS
Refills: 0 | Status: DISCONTINUED | OUTPATIENT
Start: 2020-02-06 | End: 2020-02-10

## 2020-02-06 RX ORDER — ACETAMINOPHEN 500 MG
975 TABLET ORAL ONCE
Refills: 0 | Status: COMPLETED | OUTPATIENT
Start: 2020-02-06 | End: 2020-02-06

## 2020-02-06 RX ORDER — MAGNESIUM HYDROXIDE 400 MG/1
30 TABLET, CHEWABLE ORAL
Refills: 0 | Status: DISCONTINUED | OUTPATIENT
Start: 2020-02-06 | End: 2020-02-10

## 2020-02-06 RX ORDER — CITRIC ACID/SODIUM CITRATE 300-500 MG
30 SOLUTION, ORAL ORAL ONCE
Refills: 0 | Status: COMPLETED | OUTPATIENT
Start: 2020-02-06 | End: 2020-02-06

## 2020-02-06 RX ORDER — GENTAMICIN SULFATE 40 MG/ML
275 VIAL (ML) INJECTION ONCE
Refills: 0 | Status: DISCONTINUED | OUTPATIENT
Start: 2020-02-06 | End: 2020-02-06

## 2020-02-06 RX ORDER — OXYCODONE HYDROCHLORIDE 5 MG/1
5 TABLET ORAL ONCE
Refills: 0 | Status: DISCONTINUED | OUTPATIENT
Start: 2020-02-06 | End: 2020-02-10

## 2020-02-06 RX ADMIN — Medication 1000 MILLIGRAM(S): at 18:48

## 2020-02-06 RX ADMIN — OXYCODONE HYDROCHLORIDE 5 MILLIGRAM(S): 5 TABLET ORAL at 22:00

## 2020-02-06 RX ADMIN — Medication 1000 MILLIGRAM(S): at 08:44

## 2020-02-06 RX ADMIN — Medication 216 GRAM(S): at 02:08

## 2020-02-06 RX ADMIN — HEPARIN SODIUM 5000 UNIT(S): 5000 INJECTION INTRAVENOUS; SUBCUTANEOUS at 10:43

## 2020-02-06 RX ADMIN — OXYCODONE HYDROCHLORIDE 5 MILLIGRAM(S): 5 TABLET ORAL at 21:13

## 2020-02-06 RX ADMIN — Medication 400 MILLIGRAM(S): at 18:28

## 2020-02-06 RX ADMIN — FAMOTIDINE 20 MILLIGRAM(S): 10 INJECTION INTRAVENOUS at 02:09

## 2020-02-06 RX ADMIN — OXYCODONE HYDROCHLORIDE 5 MILLIGRAM(S): 5 TABLET ORAL at 17:38

## 2020-02-06 RX ADMIN — OXYCODONE HYDROCHLORIDE 5 MILLIGRAM(S): 5 TABLET ORAL at 17:07

## 2020-02-06 RX ADMIN — Medication 400 MILLIGRAM(S): at 13:29

## 2020-02-06 RX ADMIN — ERTAPENEM SODIUM 120 MILLIGRAM(S): 1 INJECTION, POWDER, LYOPHILIZED, FOR SOLUTION INTRAMUSCULAR; INTRAVENOUS at 04:45

## 2020-02-06 RX ADMIN — OXYCODONE HYDROCHLORIDE 5 MILLIGRAM(S): 5 TABLET ORAL at 14:15

## 2020-02-06 RX ADMIN — Medication 1000 MILLIUNIT(S)/MIN: at 03:45

## 2020-02-06 RX ADMIN — SODIUM CHLORIDE 125 MILLILITER(S): 9 INJECTION INTRAMUSCULAR; INTRAVENOUS; SUBCUTANEOUS at 00:30

## 2020-02-06 RX ADMIN — SODIUM CHLORIDE 75 MILLILITER(S): 9 INJECTION, SOLUTION INTRAVENOUS at 03:30

## 2020-02-06 RX ADMIN — Medication 30 MILLILITER(S): at 02:09

## 2020-02-06 RX ADMIN — ONDANSETRON 4 MILLIGRAM(S): 8 TABLET, FILM COATED ORAL at 11:34

## 2020-02-06 RX ADMIN — Medication 1000 MILLIGRAM(S): at 13:45

## 2020-02-06 RX ADMIN — HEPARIN SODIUM 5000 UNIT(S): 5000 INJECTION INTRAVENOUS; SUBCUTANEOUS at 22:34

## 2020-02-06 RX ADMIN — Medication 10 MILLIGRAM(S): at 02:09

## 2020-02-06 RX ADMIN — Medication 400 MILLIGRAM(S): at 07:25

## 2020-02-06 RX ADMIN — Medication 975 MILLIGRAM(S): at 02:08

## 2020-02-06 RX ADMIN — OXYCODONE HYDROCHLORIDE 5 MILLIGRAM(S): 5 TABLET ORAL at 15:15

## 2020-02-06 RX ADMIN — Medication 975 MILLIGRAM(S): at 03:44

## 2020-02-06 NOTE — DISCHARGE NOTE OB - MATERIALS PROVIDED
Shaken Baby Prevention Handout/Tdap Vaccination (VIS Pub Date: 2012)/Birth Certificate Instructions/[facility] Hearing Screen Program/Guide to Postpartum Care/Vaccinations/[facility] [place] Screening Program/[place]  Immunization Record/Breastfeeding Log

## 2020-02-06 NOTE — OB RN DELIVERY SUMMARY - NS_SEPSISRSKCALC_OBGYN_ALL_OB_FT
EOS calculated successfully. EOS Risk Factor: 0.5/1000 live births ([facility] national incidence); GA=38w2d; Temp=100.94; ROM=4.8; GBS='Negative'; Antibiotics='No antibiotics or any antibiotics < 2 hrs prior to birth'

## 2020-02-06 NOTE — OB RN DELIVERY SUMMARY - NS_LABORCHARACTER_OBGYN_ALL_OB
Febrile (>38C)/Internal electronic FM/Induction of labor-AROM/External electronic FM/Chorioamnionitis/Antibiotics in labor

## 2020-02-06 NOTE — DISCHARGE NOTE OB - VISION (WITH CORRECTIVE LENSES IF THE PATIENT USUALLY WEARS THEM):
Normal vision: sees adequately in most situations; can see medication labels, newsprint
2 = assistive person

## 2020-02-06 NOTE — OB NEONATOLOGY/PEDIATRICIAN DELIVERY SUMMARY - NSPEDSNEONOTESA_OBGYN_ALL_OB_FT
38 and 2 wk female born via primary CS for category II tracings and possible chorioamnionitis  to 30 yo  B+ mother. No significant maternal or prenatal history. PNL NR/immune/neg GBS negative on . AROM clear at 22:00 on 20. Baby emerged vigorous and crying. W/D/SS Apgars 9/9. Mother wants to bottle feed and consents to Hep B. EOS .85

## 2020-02-06 NOTE — BRIEF OPERATIVE NOTE - OPERATION/FINDINGS
Grossly normal tubes and ovaries.  Anterior intramural fibroid, 4 cm    Female infant, Apgars 9, 9, weight 2860, asynclitic cephalic presentation, delivered with vaccuum

## 2020-02-06 NOTE — DISCHARGE NOTE OB - MEDICATION SUMMARY - MEDICATIONS TO TAKE
I will START or STAY ON the medications listed below when I get home from the hospital:    ibuprofen 600 mg oral tablet  -- 1 tab(s) by mouth every 6 hours  -- Indication: For pain, as needed

## 2020-02-06 NOTE — DISCHARGE NOTE OB - CARE PLAN
Principal Discharge DX:	 delivery delivered  Goal:	recovery  Assessment and plan of treatment:	pelvic rest, regular diet  Secondary Diagnosis:	Obstructive hydrocephalus  Secondary Diagnosis:	Chorioamnionitis in third trimester, single or unspecified fetus

## 2020-02-06 NOTE — OB PROVIDER DELIVERY SUMMARY - NSPROVIDERDELIVERYNOTE_OBGYN_ALL_OB_FT
Continue abx for chorioamnionitis Continue abx for chorioamnionitis    Grossly normal tubes and ovaries.  Anterior intramural fibroid, 4 cm.    Female infant, Apgars 9, 9, weight 2860, asynclitic cephalic presentation, delivered with vaccuum

## 2020-02-06 NOTE — DISCHARGE NOTE OB - PATIENT PORTAL LINK FT
You can access the FollowMyHealth Patient Portal offered by [facility] by registering at the following website: [email or web address]. By joining [facility]’s FollowMyHealth portal, you will also be able to view your health information using other applications (apps) compatible with our system.

## 2020-02-06 NOTE — DISCHARGE NOTE OB - CARE PROVIDER_API CALL
Leonardo Schilling)  MaternalFetal Medicine; Obstetrics and Gynecology  [address] [place] [address]  Phone: [phone]  Fax: [phone]  Follow Up Time:

## 2020-02-07 LAB
BASOPHILS # BLD AUTO: 0.03 K/UL — SIGNIFICANT CHANGE UP (ref 0–0.2)
BASOPHILS NFR BLD AUTO: 0.2 % — SIGNIFICANT CHANGE UP (ref 0–2)
EOSINOPHIL # BLD AUTO: 0.02 K/UL — SIGNIFICANT CHANGE UP (ref 0–0.5)
EOSINOPHIL NFR BLD AUTO: 0.1 % — SIGNIFICANT CHANGE UP (ref 0–6)
HCT VFR BLD CALC: 25.6 % — LOW (ref 34.5–45)
HGB BLD-MCNC: 8 G/DL — LOW (ref 11.5–15.5)
IMM GRANULOCYTES NFR BLD AUTO: 0.8 % — SIGNIFICANT CHANGE UP (ref 0–1.5)
LYMPHOCYTES # BLD AUTO: 12.5 % — LOW (ref 13–44)
LYMPHOCYTES # BLD AUTO: 2.42 K/UL — SIGNIFICANT CHANGE UP (ref 1–3.3)
MCHC RBC-ENTMCNC: 23.6 PG — LOW (ref 27–34)
MCHC RBC-ENTMCNC: 31.3 % — LOW (ref 32–36)
MCV RBC AUTO: 75.5 FL — LOW (ref 80–100)
MONOCYTES # BLD AUTO: 1.1 K/UL — HIGH (ref 0–0.9)
MONOCYTES NFR BLD AUTO: 5.7 % — SIGNIFICANT CHANGE UP (ref 2–14)
NEUTROPHILS # BLD AUTO: 15.7 K/UL — HIGH (ref 1.8–7.4)
NEUTROPHILS NFR BLD AUTO: 80.7 % — HIGH (ref 43–77)
NRBC # FLD: 0 K/UL — SIGNIFICANT CHANGE UP (ref 0–0)
PLATELET # BLD AUTO: 214 K/UL — SIGNIFICANT CHANGE UP (ref 150–400)
PMV BLD: 10.6 FL — SIGNIFICANT CHANGE UP (ref 7–13)
RBC # BLD: 3.39 M/UL — LOW (ref 3.8–5.2)
RBC # FLD: 14.1 % — SIGNIFICANT CHANGE UP (ref 10.3–14.5)
WBC # BLD: 19.43 K/UL — HIGH (ref 3.8–10.5)
WBC # FLD AUTO: 19.43 K/UL — HIGH (ref 3.8–10.5)

## 2020-02-07 RX ORDER — SENNA PLUS 8.6 MG/1
1 TABLET ORAL
Refills: 0 | Status: DISCONTINUED | OUTPATIENT
Start: 2020-02-07 | End: 2020-02-10

## 2020-02-07 RX ORDER — FERROUS SULFATE 325(65) MG
325 TABLET ORAL THREE TIMES A DAY
Refills: 0 | Status: DISCONTINUED | OUTPATIENT
Start: 2020-02-07 | End: 2020-02-10

## 2020-02-07 RX ORDER — ASCORBIC ACID 60 MG
500 TABLET,CHEWABLE ORAL DAILY
Refills: 0 | Status: DISCONTINUED | OUTPATIENT
Start: 2020-02-07 | End: 2020-02-10

## 2020-02-07 RX ADMIN — Medication 325 MILLIGRAM(S): at 12:04

## 2020-02-07 RX ADMIN — MAGNESIUM HYDROXIDE 30 MILLILITER(S): 400 TABLET, CHEWABLE ORAL at 10:39

## 2020-02-07 RX ADMIN — OXYCODONE HYDROCHLORIDE 5 MILLIGRAM(S): 5 TABLET ORAL at 18:31

## 2020-02-07 RX ADMIN — Medication 975 MILLIGRAM(S): at 17:46

## 2020-02-07 RX ADMIN — OXYCODONE HYDROCHLORIDE 5 MILLIGRAM(S): 5 TABLET ORAL at 10:38

## 2020-02-07 RX ADMIN — OXYCODONE HYDROCHLORIDE 5 MILLIGRAM(S): 5 TABLET ORAL at 17:46

## 2020-02-07 RX ADMIN — Medication 1 MILLIGRAM(S): at 12:04

## 2020-02-07 RX ADMIN — Medication 975 MILLIGRAM(S): at 13:00

## 2020-02-07 RX ADMIN — Medication 975 MILLIGRAM(S): at 06:52

## 2020-02-07 RX ADMIN — Medication 400 MILLIGRAM(S): at 00:54

## 2020-02-07 RX ADMIN — Medication 975 MILLIGRAM(S): at 18:31

## 2020-02-07 RX ADMIN — ERTAPENEM SODIUM 120 MILLIGRAM(S): 1 INJECTION, POWDER, LYOPHILIZED, FOR SOLUTION INTRAMUSCULAR; INTRAVENOUS at 03:52

## 2020-02-07 RX ADMIN — Medication 975 MILLIGRAM(S): at 06:05

## 2020-02-07 RX ADMIN — Medication 975 MILLIGRAM(S): at 12:04

## 2020-02-07 RX ADMIN — OXYCODONE HYDROCHLORIDE 5 MILLIGRAM(S): 5 TABLET ORAL at 11:19

## 2020-02-07 RX ADMIN — Medication 325 MILLIGRAM(S): at 13:33

## 2020-02-07 RX ADMIN — Medication 1000 MILLIGRAM(S): at 01:30

## 2020-02-07 RX ADMIN — SIMETHICONE 80 MILLIGRAM(S): 80 TABLET, CHEWABLE ORAL at 17:46

## 2020-02-07 RX ADMIN — HEPARIN SODIUM 5000 UNIT(S): 5000 INJECTION INTRAVENOUS; SUBCUTANEOUS at 10:40

## 2020-02-07 NOTE — PROGRESS NOTE PEDS - PROBLEM SELECTOR PLAN 1
- Continue regular diet.  - Increase ambulation; HSQ and SCDs for DVT prophylaxis   - Continue motrin, tylenol, oxycodone PRN for pain control.  - Continue with Invanz for one dose  - F/u AM CBC    Pb Kaur PGY1 - Continue regular diet.  - Increase ambulation; HSQ and SCDs for DVT prophylaxis   - Continue motrin, tylenol, oxycodone PRN for pain control.  - Continue with Invanz for total of 2 doses   - F/u AM CBC    Pb Kaur PGY1

## 2020-02-07 NOTE — PROGRESS NOTE PEDS - ASSESSMENT
28yo POD#1 s/p LTCS for category 2 tracing, labor complicated by suspected intraamniotic infection; patient is stable, afebrile, and doing well post-operatively. 28yo POD#1 s/p LTCS for category 2 tracing, labor complicated by suspected intraamniotic infection; patient is stable, afebrile, doing well post-operatively.

## 2020-02-07 NOTE — PROGRESS NOTE PEDS - SUBJECTIVE AND OBJECTIVE BOX
OB Progress Note:  Delivery, POD#1    S: 28yo POD#1 s/p LTCS for category 2 tracing, labor complicated by an intrapartum temperature. Denies any fevers or chills. Her pain is well controlled. She is tolerating a regular diet and passing flatus. Denies N/V. Denies CP/SOB/lightheadedness/dizziness.   She is ambulating without difficulty.   Voiding spontaneously. Denies heavy vaginal bleeding.     O:   Vital Signs Last 24 Hrs  T(C): 37.2 (2020 02:14), Max: 37.2 (2020 02:14)  T(F): 98.9 (2020 02:14), Max: 98.9 (2020 02:14)  HR: 90 (2020 02:14) (59 - 99)  BP: 101/60 (2020 02:14) (101/60 - 127/70)  BP(mean): 82 (2020 08:30) (60 - 84)  RR: 17 (2020 02:14) (15 - 21)  SpO2: 99% (2020 02:14) (98% - 100%)    Labs:  Blood type: B Positive  Rubella IgG: RPR: Negative                          10.5<L>   17.75<H> >-----------< 293    ( 05 @ 18:30 )             32.6<L>          PE:  General: NAD  Abdomen: Mildly distended, appropriately tender, incision c/d/i.  Extremities: No erythema, no pitting edema OB Progress Note:  Delivery, POD#1    S: 28yo POD#1 s/p LTCS for category 2 tracing, labor complicated by an intrapartum temperature, currently on Invanz. Patient denies any fevers or chills. Her pain is well controlled. She is tolerating a regular diet; has not passed flatus yet. Denies N/V. Denies CP/SOB/lightheadedness/dizziness.   She is ambulating without difficulty.   Voiding spontaneously. Denies heavy vaginal bleeding.     O:   Vital Signs Last 24 Hrs  T(C): 37.2 (2020 02:14), Max: 37.2 (2020 02:14)  T(F): 98.9 (2020 02:14), Max: 98.9 (2020 02:14)  HR: 90 (2020 02:14) (59 - 99)  BP: 101/60 (2020 02:14) (101/60 - 127/70)  BP(mean): 82 (2020 08:30) (60 - 84)  RR: 17 (2020 02:14) (15 - 21)  SpO2: 99% (2020 02:14) (98% - 100%)    Labs:  Blood type: B Positive  Rubella IgG: RPR: Negative                          10.5<L>   17.75<H> >-----------< 293    (  @ 18:30 )             32.6<L>          PE:  General: NAD  Abdomen: Mildly distended, appropriately tender, incision c/d/i, bowel sounds present throughout   Extremities: No erythema, no pitting edema

## 2020-02-07 NOTE — PROGRESS NOTE ADULT - SUBJECTIVE AND OBJECTIVE BOX
Postop Day  __1_ s/p   C- Section    THERAPY:    [  ] Spinal morphine ____ mg  [ x ] Epidural morphine _3__ mg  [  ] IV PCA Hydromophone 1 mg/ml    OBJECTIVE:    Sedation Score:	  [ x ] Alert	    [  ] Drowsy        [  ] Arousable	[  ] Asleep	[  ] Unresponsive    Side Effects:	  [  x] None	     [  ] Nausea        [  ] Pruritus        [  ] Weaknes   [  ] Numbness   [  ] Other:        ASSESSMENT/ PLAN  [  ] Continue   [ x ] Discpntinue   [ x ]Documentation and Verification of current medications     Comments:

## 2020-02-08 LAB
APPEARANCE UR: SIGNIFICANT CHANGE UP
BILIRUB UR-MCNC: NEGATIVE — SIGNIFICANT CHANGE UP
BLOOD UR QL VISUAL: SIGNIFICANT CHANGE UP
COLOR SPEC: YELLOW — SIGNIFICANT CHANGE UP
EPI CELLS # UR: SIGNIFICANT CHANGE UP
GLUCOSE UR-MCNC: NEGATIVE — SIGNIFICANT CHANGE UP
KETONES UR-MCNC: NEGATIVE — SIGNIFICANT CHANGE UP
LEUKOCYTE ESTERASE UR-ACNC: NEGATIVE — SIGNIFICANT CHANGE UP
NITRITE UR-MCNC: NEGATIVE — SIGNIFICANT CHANGE UP
PH UR: 6.5 — SIGNIFICANT CHANGE UP (ref 5–8)
PROT UR-MCNC: 70 — SIGNIFICANT CHANGE UP
RBC CASTS # UR COMP ASSIST: HIGH (ref 0–?)
SP GR SPEC: 1.04 — SIGNIFICANT CHANGE UP (ref 1–1.04)
UROBILINOGEN FLD QL: NORMAL — SIGNIFICANT CHANGE UP

## 2020-02-08 PROCEDURE — 70553 MRI BRAIN STEM W/O & W/DYE: CPT | Mod: 26

## 2020-02-08 PROCEDURE — 72158 MRI LUMBAR SPINE W/O & W/DYE: CPT | Mod: 26

## 2020-02-08 PROCEDURE — 99223 1ST HOSP IP/OBS HIGH 75: CPT | Mod: GC

## 2020-02-08 RX ORDER — IBUPROFEN 200 MG
1 TABLET ORAL
Qty: 0 | Refills: 0 | DISCHARGE
Start: 2020-02-08

## 2020-02-08 RX ADMIN — OXYCODONE HYDROCHLORIDE 5 MILLIGRAM(S): 5 TABLET ORAL at 18:25

## 2020-02-08 RX ADMIN — SIMETHICONE 80 MILLIGRAM(S): 80 TABLET, CHEWABLE ORAL at 12:14

## 2020-02-08 RX ADMIN — OXYCODONE HYDROCHLORIDE 5 MILLIGRAM(S): 5 TABLET ORAL at 06:30

## 2020-02-08 RX ADMIN — SIMETHICONE 80 MILLIGRAM(S): 80 TABLET, CHEWABLE ORAL at 18:25

## 2020-02-08 RX ADMIN — Medication 1 MILLIGRAM(S): at 12:14

## 2020-02-08 RX ADMIN — Medication 975 MILLIGRAM(S): at 06:30

## 2020-02-08 RX ADMIN — Medication 975 MILLIGRAM(S): at 00:45

## 2020-02-08 RX ADMIN — OXYCODONE HYDROCHLORIDE 5 MILLIGRAM(S): 5 TABLET ORAL at 19:00

## 2020-02-08 RX ADMIN — Medication 975 MILLIGRAM(S): at 18:25

## 2020-02-08 RX ADMIN — Medication 975 MILLIGRAM(S): at 19:00

## 2020-02-08 RX ADMIN — Medication 975 MILLIGRAM(S): at 13:00

## 2020-02-08 RX ADMIN — Medication 325 MILLIGRAM(S): at 06:30

## 2020-02-08 RX ADMIN — OXYCODONE HYDROCHLORIDE 5 MILLIGRAM(S): 5 TABLET ORAL at 12:14

## 2020-02-08 RX ADMIN — HEPARIN SODIUM 5000 UNIT(S): 5000 INJECTION INTRAVENOUS; SUBCUTANEOUS at 00:15

## 2020-02-08 RX ADMIN — Medication 975 MILLIGRAM(S): at 06:52

## 2020-02-08 RX ADMIN — OXYCODONE HYDROCHLORIDE 5 MILLIGRAM(S): 5 TABLET ORAL at 06:52

## 2020-02-08 RX ADMIN — Medication 325 MILLIGRAM(S): at 12:14

## 2020-02-08 RX ADMIN — Medication 500 MILLIGRAM(S): at 12:14

## 2020-02-08 RX ADMIN — Medication 975 MILLIGRAM(S): at 00:15

## 2020-02-08 RX ADMIN — Medication 1 TABLET(S): at 12:14

## 2020-02-08 RX ADMIN — HEPARIN SODIUM 5000 UNIT(S): 5000 INJECTION INTRAVENOUS; SUBCUTANEOUS at 12:14

## 2020-02-08 RX ADMIN — OXYCODONE HYDROCHLORIDE 5 MILLIGRAM(S): 5 TABLET ORAL at 13:00

## 2020-02-08 RX ADMIN — OXYCODONE HYDROCHLORIDE 5 MILLIGRAM(S): 5 TABLET ORAL at 00:45

## 2020-02-08 RX ADMIN — Medication 975 MILLIGRAM(S): at 12:15

## 2020-02-08 RX ADMIN — OXYCODONE HYDROCHLORIDE 5 MILLIGRAM(S): 5 TABLET ORAL at 00:15

## 2020-02-08 NOTE — PROGRESS NOTE ADULT - SUBJECTIVE AND OBJECTIVE BOX
OB Progress Note: LTCS, POD#2    S: 28yo  POD#2 s/p LTCS for category 2 tracing.  PMH signficant for chiari malformation, hydrocephalus, craniosynotosis, and  shunt. Pain is well controlled. She is tolerating a regular diet and passing flatus. Since delivery, patient has had urinary and fecal incontinence. Pt describes lower extremity strength as baseline as normal.  Pt walks with a cane and has decreased strength normally, but states there has been no change. No deficit in sensory function. Denies CP/SOB. Denies lightheadedness/dizziness. Denies N/V.    O:  Vitals:  Vital Signs Last 24 Hrs  T(C): 36.9 (2020 05:50), Max: 36.9 (2020 05:50)  T(F): 98.5 (2020 05:50), Max: 98.5 (2020 05:50)  HR: 66 (2020 05:50) (64 - 94)  BP: 119/69 (2020 05:50) (100/52 - 119/69)  BP(mean): --  RR: 17 (2020 05:50) (16 - 18)  SpO2: 97% (2020 05:50) (97% - 100%)    MEDICATIONS  (STANDING):  acetaminophen   Tablet .. 975 milliGRAM(s) Oral <User Schedule>  ascorbic acid 500 milliGRAM(s) Oral daily  diphtheria/tetanus/pertussis (acellular) Vaccine (ADAcel) 0.5 milliLiter(s) IntraMuscular once  ferrous    sulfate 325 milliGRAM(s) Oral three times a day  folic acid 1 milliGRAM(s) Oral daily  heparin  Injectable 5000 Unit(s) SubCutaneous every 12 hours  ibuprofen  Tablet. 600 milliGRAM(s) Oral every 6 hours  influenza   Vaccine 0.5 milliLiter(s) IntraMuscular once  prenatal multivitamin 1 Tablet(s) Oral daily      MEDICATIONS  (PRN):  diphenhydrAMINE 25 milliGRAM(s) Oral every 6 hours PRN Itching  glycerin Suppository - Adult 1 Suppository(s) Rectal at bedtime PRN Constipation  lanolin Ointment 1 Application(s) Topical every 6 hours PRN Sore Nipples  magnesium hydroxide Suspension 30 milliLiter(s) Oral two times a day PRN Constipation  oxyCODONE    IR 5 milliGRAM(s) Oral every 3 hours PRN Moderate to Severe Pain (4-10)  oxyCODONE    IR 5 milliGRAM(s) Oral once PRN Moderate to Severe Pain (4-10)  senna 1 Tablet(s) Oral two times a day PRN Constipation  simethicone 80 milliGRAM(s) Chew every 4 hours PRN Gas      Labs:  Blood type: B Positive  Rubella IgG: RPR: Negative                          8.0<L>   19.43<H> >-----------< 214    (  @ 07:08 )             25.6<L>                        10.5<L>   17.75<H> >-----------< 293    (  @ 18:30 )             32.6<L>      PE:  General: NAD  Abdomen: Soft, appropriately tender, incision c/d/i.  Extremities: No erythema, no pitting edema

## 2020-02-08 NOTE — PROGRESS NOTE ADULT - ATTENDING COMMENTS
Pt seen and examined - fundus firm - no bladder distention appreciated  Incontinence described as leaking some urine prior to making it to the bathroom. Feels sudden strong urge and leaks on way to bathroom. No stool incontinence today. She described decreased stool sensation - passed stool unexpectedly in toilet when voiding  Neuro consult appreciated - no change in MRI and the do not believe that there is neurological cause  RN checked post-void residual -> 20cc  Urine sent for culture  will recommend timed voiding

## 2020-02-08 NOTE — CONSULT NOTE ADULT - ATTENDING COMMENTS
NEUROLOGY ATTENDING: LAYLA   PATIENT SEEN AND EXAMINED WITH NEUROLOGY HOUSESTAFF ON 20  MET WITH FAMILY AT BEDSIDE  IMAGING REVIEWED  CC: INCONTINENCE.    30 yo RH woman with PMHx VPS and craniosynostosis 9s/p repair), delivered baby on 20 by  and since that time has had incontinence of urine and stool. She states at the moment she feels she needs to go to the bathroom, the urine comes out before she has any time to get to the bathroom. Stool comes without such warning.     This has never happened in the past. Her legs are strong.    She underwent the last VPS revision in 2017 according to her and in  according to the notes. She underwent cranial vault expansion procedure for craniosynostosis in the remote past. She knows Drs Solitario, Laz, and Niraj. She has had multiple neurosurgical procedures.    Although she has had pain from the , she takes Oxycodone 5mg q3 prn (7 doses since 20)    EXAMINATION  AOx3. Speech is fluent and nonaphasic  Nondysmorphic appearance.  LEFT EYE MOVEMENT abnormality (At baseline)  Provides full history  Holding baby  No leg weakness  DTRs intact  No Babinski    IMAGING  I PERSONALLY REVIEWED SPINE IMAGING DATED MRI – 20 COMPARED WITH 18 and Brain IMAGING 3/19/19 and 20. There has been no interval change. The lumbar spine appears normal. The brain MRI demonstrates encephalomalacia posteriorly at the occipital region where two shunts are present (one left sided and the active one right sided). There is no hydrocephalus. There is no CVA. There is no hemorrhage.    IMPRESSION/PLAN  INCONTINENCE – I suspect this has a urogenital etiology. There is no hydrocephalus no any weakness/numbness of the legs to suggest a neurologic etiology. Moreover, imaging of brain and spine are unremarkable.    DISPO – Will sign off, please reconsult if we can be of any future assistance.

## 2020-02-08 NOTE — PROVIDER CONTACT NOTE (OTHER) - SITUATION
Called to patient room following assessment, pt reports repeated episodes of urinary incontinence and fecal incontinence. Pt reports "I cannot tell if I have gas or a bowel movement."

## 2020-02-08 NOTE — PROGRESS NOTE ADULT - PROBLEM SELECTOR PLAN 1
- Continue regular diet.  - Increase ambulation  - Continue motrin, tylenol, oxycodone PRN for pain control.   - Non-contrast MRI of brain and lumbar spine ordered to rule out lumbar pathology per recommendations by neurology. Pt currently being scanned  - Will f/u with neurosurgery when imaging is available to check  shunt is correctly positioned  - s/p INvanz 2/6-7 for intrapartum temperature, now resolved  - will be in close communication with neurology and neurosurgery for follow up on deficits.    Dee Stahl, PGY-1

## 2020-02-08 NOTE — PROVIDER CONTACT NOTE (OTHER) - BACKGROUND
S/P C/S 2/6, Hx. of hydrocephalus, with  shunt and multiple revisions craniosynstosis, craniotomy, vertigo, R sided neuropathy

## 2020-02-08 NOTE — CONSULT NOTE ADULT - ASSESSMENT
Impression:    Likely related to post-partum period. However, cannot r/o hydrocephalus vs. lumbar pathology.     Plan:  - MRI Brain w/o; MRI Lumbar spine w/wo     - NSx consultation if acute findings.   - Continued supportive care.   - Consider bulk forming laxative when off opioids.

## 2020-02-08 NOTE — PROVIDER CONTACT NOTE (OTHER) - ACTION/TREATMENT ORDERED:
Per  straight cath patient for post residual void and leave catheter in if output greater than 200cc.
Neurology consulted, MRI of brain and lumbar spine ordered.

## 2020-02-08 NOTE — CONSULT NOTE ADULT - SUBJECTIVE AND OBJECTIVE BOX
Neurology  Consult Note  20    Name:  AKILAH BEARDEN; 29y (1990)    Reason for Admission:     Chief Complaint:  HPI:  30 yo Black female  @ 38.1 wks GA presents for c/o ctx more frequently becoming very uncomfortable and requesting pain meds at this time.  Denies any VB, LOF, and reports Good FM's  PNC: Dr Schilling  [facility]: Detailed Maternal HX  Craniosynostosis    Meniere disease  Pt  discontinued   triamterene/HCTZ,  stopped March. Pt states MD aware  Obstructive hydrocephalus   shunt placed at birth with multiple revisions for malfunction. LAST REVISION 2015  Personal History of Hydrocephalus (ICD9 V12.49)    Strabismus  H/O eye surgery  Repair of lazy eyes   S/P craniotomy  cranial vault expansion  Ventriculo-Peritoneal Shunt Status (ICD9 V45.2)   shunt placed at birth with multiple revisions 1990 2002 x2 2003 .  Denies any current Meds  NKDA  GBS reported Negative  Anesthesia consult on File and in Chart with Neurologists contact information (2020 18:41)    Neurology consulted for incontinence. PAtient is currently s/p  POD#1. Experiencing fecal and urinary incontinence. Patient states that she feels the urge to go to the bathroom, but is unable to make it there on time to urinate. PAtient describes passing gas and, when in the bathroom, realized that she had been incontinent of stool. No weakness, visual changes, sensory changes. Denies, fevers, chills, ns, cp, sob, abd pain, n/v/d/c.     Review of Systems:  As states in HPI.        PMHx:   Neuropathy  Vertigo  Craniosynostosis  Meniere disease  Obstructive hydrocephalus  Strabismus  Personal History of Hydrocephalus (ICD9 V12.49)    PFHx:   No pertinent family history    PSuHx:   H/O eye surgery  S/P craniotomy  Ventriculo-Peritoneal Shunt Status (ICD9 V45.2)    Medications:  MEDICATIONS  (STANDING):  acetaminophen   Tablet .. 975 milliGRAM(s) Oral <User Schedule>  ascorbic acid 500 milliGRAM(s) Oral daily  diphtheria/tetanus/pertussis (acellular) Vaccine (ADAcel) 0.5 milliLiter(s) IntraMuscular once  ferrous    sulfate 325 milliGRAM(s) Oral three times a day  folic acid 1 milliGRAM(s) Oral daily  heparin  Injectable 5000 Unit(s) SubCutaneous every 12 hours  ibuprofen  Tablet. 600 milliGRAM(s) Oral every 6 hours  influenza   Vaccine 0.5 milliLiter(s) IntraMuscular once  prenatal multivitamin 1 Tablet(s) Oral daily    MEDICATIONS  (PRN):  diphenhydrAMINE 25 milliGRAM(s) Oral every 6 hours PRN Itching  glycerin Suppository - Adult 1 Suppository(s) Rectal at bedtime PRN Constipation  lanolin Ointment 1 Application(s) Topical every 6 hours PRN Sore Nipples  magnesium hydroxide Suspension 30 milliLiter(s) Oral two times a day PRN Constipation  oxyCODONE    IR 5 milliGRAM(s) Oral every 3 hours PRN Moderate to Severe Pain (4-10)  oxyCODONE    IR 5 milliGRAM(s) Oral once PRN Moderate to Severe Pain (4-10)  senna 1 Tablet(s) Oral two times a day PRN Constipation  simethicone 80 milliGRAM(s) Chew every 4 hours PRN Gas    Vitals:  T(C): 36.7 (20 @ 21:51), Max: 37.3 (20 @ 05:42)  HR: 75 (20 @ 21:51) (68 - 94)  BP: 115/58 (20 @ 21:51) (100/52 - 115/58)  RR: 18 (20 @ 21:51) (17 - 18)  SpO2: 100% (20 @ 21:51) (99% - 100%)    Labs:                        8.0    19.43 )-----------( 214      ( 2020 07:08 )             25.6     PHYSICAL EXAMINATION:  General: Well-developed, well nourished, in no acute distress.  Eyes: Conjunctiva and sclera clear.  Neurologic:  - Mental Status:  Alert, awake, oriented to person, place, and time; Speech is fluent with intact naming, repetition, and comprehension; Good overall fund of knowledge;   - Cranial Nerves II-XII:    II:  Visual fields are full to confrontation; Pupils are equal, round, and reactive to light;   III, IV, VI: Left eye lateral gaze palsy (chronic)  V:  Facial sensation is intact in the V1-V3 distribution bilaterally.  VII:  Face is symmetric with normal eye closure and smile  VIII:  Hearing is intact to finger rub.  IX, X:  Uvula is midline and soft palate rises symmetrically  XI:  Head turning and shoulder shrug are intact.  XII:  Tongue protudes in the midline.  - Motor:  Strength is 5/5 throughout.  There is no pronator drift.  Normal muscle bulk and tone throughout.  - Reflexes:  2+ and symmetric at the biceps, triceps, brachioradialis, knees, and ankles.  Plantar responses flexor.  - Sensory:  Intact throughout to vibration, and joint-position, light touch, pin prick.  - Coordination:  Finger-nose-finger and heel-knee-shin intact without dysmetria.  Rapid alternating hand movements intact.

## 2020-02-08 NOTE — PROVIDER CONTACT NOTE (OTHER) - ASSESSMENT
Pt A&O x3, incision, fundus, and lochia WNL for PP day 1. VSS. Pt BS hyperactive all quadrants. Pt reports stool was soft formed stool. Denies diarrhea. Pt ambulates with assist to restroom, but reports unable to get to restroom in time to urinate/stool. Pt. reports "I get the urge and immediately go." Pt encouraged to implement toilet schedule.

## 2020-02-08 NOTE — PROGRESS NOTE ADULT - SUBJECTIVE AND OBJECTIVE BOX
Approx 2230 RN notified me that patient has been experiencing incontinence of feces and urine today and with her medical history of craniotomy and hydrocephalus this is a new onset and has never happened to her before.    PAST MEDICAL & SURGICAL HISTORY:  Neuropathy: R side of the body  Vertigo  Craniosynostosis  Obstructive hydrocephalus:  shunt placed at birth with multiple revisions for malfunction. LAST REVISION 02/2015  Strabismus  Personal History of Hydrocephalus (ICD9 V12.49)  H/O eye surgery: Repair of lazy eyes 1995  S/P craniotomy: cranial vault expansion  Ventriculo-Peritoneal Shunt Status (ICD9 V45.2):  shunt placed at birth with multiple revisions 1990 2002 x2 2003 2004 2007 2009 2011 2015 2016,2017    Neurology consult called and came to assess patient; per neuro recommendations, MRI of brain without contrast and MRI of lumbar spine with and without contrast ordered.    Dr. Francisco and Sandee PGY1 made aware

## 2020-02-09 RX ADMIN — Medication 1 MILLIGRAM(S): at 13:50

## 2020-02-09 RX ADMIN — Medication 325 MILLIGRAM(S): at 00:10

## 2020-02-09 RX ADMIN — Medication 975 MILLIGRAM(S): at 14:20

## 2020-02-09 RX ADMIN — Medication 975 MILLIGRAM(S): at 06:05

## 2020-02-09 RX ADMIN — OXYCODONE HYDROCHLORIDE 5 MILLIGRAM(S): 5 TABLET ORAL at 06:05

## 2020-02-09 RX ADMIN — HEPARIN SODIUM 5000 UNIT(S): 5000 INJECTION INTRAVENOUS; SUBCUTANEOUS at 00:10

## 2020-02-09 RX ADMIN — OXYCODONE HYDROCHLORIDE 5 MILLIGRAM(S): 5 TABLET ORAL at 14:20

## 2020-02-09 RX ADMIN — Medication 975 MILLIGRAM(S): at 06:35

## 2020-02-09 RX ADMIN — OXYCODONE HYDROCHLORIDE 5 MILLIGRAM(S): 5 TABLET ORAL at 00:09

## 2020-02-09 RX ADMIN — OXYCODONE HYDROCHLORIDE 5 MILLIGRAM(S): 5 TABLET ORAL at 13:49

## 2020-02-09 RX ADMIN — OXYCODONE HYDROCHLORIDE 5 MILLIGRAM(S): 5 TABLET ORAL at 00:30

## 2020-02-09 RX ADMIN — OXYCODONE HYDROCHLORIDE 5 MILLIGRAM(S): 5 TABLET ORAL at 21:16

## 2020-02-09 RX ADMIN — Medication 975 MILLIGRAM(S): at 00:30

## 2020-02-09 RX ADMIN — Medication 975 MILLIGRAM(S): at 21:16

## 2020-02-09 RX ADMIN — OXYCODONE HYDROCHLORIDE 5 MILLIGRAM(S): 5 TABLET ORAL at 20:43

## 2020-02-09 RX ADMIN — Medication 975 MILLIGRAM(S): at 20:43

## 2020-02-09 RX ADMIN — OXYCODONE HYDROCHLORIDE 5 MILLIGRAM(S): 5 TABLET ORAL at 06:35

## 2020-02-09 RX ADMIN — Medication 500 MILLIGRAM(S): at 13:48

## 2020-02-09 RX ADMIN — Medication 1 TABLET(S): at 13:49

## 2020-02-09 RX ADMIN — Medication 325 MILLIGRAM(S): at 06:05

## 2020-02-09 RX ADMIN — Medication 975 MILLIGRAM(S): at 13:50

## 2020-02-09 RX ADMIN — Medication 325 MILLIGRAM(S): at 22:16

## 2020-02-09 RX ADMIN — HEPARIN SODIUM 5000 UNIT(S): 5000 INJECTION INTRAVENOUS; SUBCUTANEOUS at 13:49

## 2020-02-09 RX ADMIN — Medication 975 MILLIGRAM(S): at 00:09

## 2020-02-09 NOTE — PROGRESS NOTE ADULT - ASSESSMENT
28yo  POD#3 s/p pLTCS for NRFHT  c/w post-operative urinary and fecal incontinence.  Patient is otherwise stable and doing well post-operatively.

## 2020-02-09 NOTE — PROGRESS NOTE ADULT - SUBJECTIVE AND OBJECTIVE BOX
OB Progress Note: pLTCS, POD#3    S: 28yo w/ PMH of chiari malformation, hydrocephalus, crainiosynotosis, and  shunt POD#3 s/p pLTCS. Pain is well controlled. She is tolerating a regular diet and passing flatus.  Ambulating without difficulty. Patient has had continued urine and fecal incontinence since delivery, described as a race to the bathroom. Denies CP/SOB. Denies lightheadedness/dizziness. Denies N/V.    O:  Vitals:  Vital Signs Last 24 Hrs  T(C): 36.9 (09 Feb 2020 05:22), Max: 36.9 (09 Feb 2020 00:57)  T(F): 98.5 (09 Feb 2020 05:22), Max: 98.5 (09 Feb 2020 00:57)  HR: 58 (09 Feb 2020 05:22) (58 - 79)  BP: 116/67 (09 Feb 2020 05:22) (107/64 - 134/76)  BP(mean): --  RR: 16 (09 Feb 2020 05:22) (16 - 18)  SpO2: 100% (09 Feb 2020 05:22) (100% - 100%)    MEDICATIONS  (STANDING):  acetaminophen   Tablet .. 975 milliGRAM(s) Oral <User Schedule>  ascorbic acid 500 milliGRAM(s) Oral daily  diphtheria/tetanus/pertussis (acellular) Vaccine (ADAcel) 0.5 milliLiter(s) IntraMuscular once  ferrous    sulfate 325 milliGRAM(s) Oral three times a day  folic acid 1 milliGRAM(s) Oral daily  heparin  Injectable 5000 Unit(s) SubCutaneous every 12 hours  ibuprofen  Tablet. 600 milliGRAM(s) Oral every 6 hours  influenza   Vaccine 0.5 milliLiter(s) IntraMuscular once  prenatal multivitamin 1 Tablet(s) Oral daily      MEDICATIONS  (PRN):  diphenhydrAMINE 25 milliGRAM(s) Oral every 6 hours PRN Itching  glycerin Suppository - Adult 1 Suppository(s) Rectal at bedtime PRN Constipation  lanolin Ointment 1 Application(s) Topical every 6 hours PRN Sore Nipples  magnesium hydroxide Suspension 30 milliLiter(s) Oral two times a day PRN Constipation  oxyCODONE    IR 5 milliGRAM(s) Oral every 3 hours PRN Moderate to Severe Pain (4-10)  oxyCODONE    IR 5 milliGRAM(s) Oral once PRN Moderate to Severe Pain (4-10)  senna 1 Tablet(s) Oral two times a day PRN Constipation  simethicone 80 milliGRAM(s) Chew every 4 hours PRN Gas      Labs:  Blood type: B Positive  Rubella IgG: RPR: Negative                          8.0<L>   19.43<H> >-----------< 214    ( 02-07 @ 07:08 )             25.6<L>      PE:  General: NAD  Abdomen: Soft, appropriately tender, incision c/d/i.  Extremities: No erythema, no pitting edema

## 2020-02-09 NOTE — PROGRESS NOTE ADULT - ATTENDING COMMENTS
Pt has not had any stool accidents  Leaks on way to bathroom when feels urge to void q 3 hours  Encouraged timed voiding q 2hours during day

## 2020-02-09 NOTE — PROGRESS NOTE ADULT - PROBLEM SELECTOR PLAN 1
- Continue regular diet.  - Increase ambulation.  - Continue motrin, tylenol, oxycodone PRN for pain control.  - non-contrast brain and spine MRI normal, no changes from previous imaging,  shunt in correct position  - per consultation with neurology, unlikely to be a neurologic etiology, their suspicion is genitourinary changes postpartum  - UA wnl, UCx pending  - low post void residual, no issue of retention  - discharge planning w/ close follow up    Dee Stahl, PGY-1

## 2020-02-10 ENCOUNTER — APPOINTMENT (OUTPATIENT)
Dept: OBGYN | Facility: CLINIC | Age: 30
End: 2020-02-10

## 2020-02-10 VITALS
HEART RATE: 68 BPM | DIASTOLIC BLOOD PRESSURE: 75 MMHG | SYSTOLIC BLOOD PRESSURE: 124 MMHG | TEMPERATURE: 99 F | OXYGEN SATURATION: 100 % | RESPIRATION RATE: 16 BRPM

## 2020-02-10 LAB
BACTERIA UR CULT: SIGNIFICANT CHANGE UP
SPECIMEN SOURCE: SIGNIFICANT CHANGE UP

## 2020-02-10 RX ADMIN — OXYCODONE HYDROCHLORIDE 5 MILLIGRAM(S): 5 TABLET ORAL at 00:42

## 2020-02-10 RX ADMIN — Medication 1 MILLIGRAM(S): at 11:47

## 2020-02-10 RX ADMIN — Medication 975 MILLIGRAM(S): at 03:55

## 2020-02-10 RX ADMIN — OXYCODONE HYDROCHLORIDE 5 MILLIGRAM(S): 5 TABLET ORAL at 10:28

## 2020-02-10 RX ADMIN — Medication 1 TABLET(S): at 11:47

## 2020-02-10 RX ADMIN — HEPARIN SODIUM 5000 UNIT(S): 5000 INJECTION INTRAVENOUS; SUBCUTANEOUS at 11:47

## 2020-02-10 RX ADMIN — OXYCODONE HYDROCHLORIDE 5 MILLIGRAM(S): 5 TABLET ORAL at 07:03

## 2020-02-10 RX ADMIN — OXYCODONE HYDROCHLORIDE 5 MILLIGRAM(S): 5 TABLET ORAL at 09:50

## 2020-02-10 RX ADMIN — OXYCODONE HYDROCHLORIDE 5 MILLIGRAM(S): 5 TABLET ORAL at 01:12

## 2020-02-10 RX ADMIN — Medication 975 MILLIGRAM(S): at 09:50

## 2020-02-10 RX ADMIN — OXYCODONE HYDROCHLORIDE 5 MILLIGRAM(S): 5 TABLET ORAL at 04:33

## 2020-02-10 RX ADMIN — OXYCODONE HYDROCHLORIDE 5 MILLIGRAM(S): 5 TABLET ORAL at 04:03

## 2020-02-10 RX ADMIN — Medication 975 MILLIGRAM(S): at 04:25

## 2020-02-10 RX ADMIN — Medication 975 MILLIGRAM(S): at 10:27

## 2020-02-10 RX ADMIN — HEPARIN SODIUM 5000 UNIT(S): 5000 INJECTION INTRAVENOUS; SUBCUTANEOUS at 00:42

## 2020-02-10 RX ADMIN — Medication 500 MILLIGRAM(S): at 11:47

## 2020-02-10 RX ADMIN — Medication 325 MILLIGRAM(S): at 11:47

## 2020-02-10 RX ADMIN — Medication 325 MILLIGRAM(S): at 06:33

## 2020-02-10 RX ADMIN — OXYCODONE HYDROCHLORIDE 5 MILLIGRAM(S): 5 TABLET ORAL at 06:33

## 2020-02-10 NOTE — PROGRESS NOTE ADULT - PROBLEM SELECTOR PLAN 1
- Continue motrin, tylenol, oxycodone PRN for pain control.  - MRI brain and lumbar spine negative for pathology   - Increase ambulation; HSQ and SCDs for DVT prophylaxis   - Continue regular diet  - Discharge planning    Pb Kaur PGY1

## 2020-02-10 NOTE — PROGRESS NOTE ADULT - SUBJECTIVE AND OBJECTIVE BOX
OB Postpartum Note:  Delivery, POD#4    S: 30yo POD#4 s/p LTCS, PMH of hydrocephalous and Chiari malformation with a  shunt in place. The patient feels well; fecal and urinary incontinence improved. Pain is well controlled. She is tolerating a regular diet and passing flatus. She is voiding spontaneously, and ambulating without difficulty. Denies CP/SOB. Denies lightheadedness/dizziness. Denies N/V. Denies heavy vaginal bleeding.     O:  Vitals:  Vital Signs Last 24 Hrs  T(C): 36.8 (2020 22:42), Max: 36.9 (2020 05:22)  T(F): 98.2 (2020 22:42), Max: 98.5 (2020 05:22)  HR: 65 (2020 22:42) (58 - 83)  BP: 128/66 (2020 22:42) (116/67 - 128/66)  BP(mean): --  RR: 16 (2020 22:42) (16 - 16)  SpO2: 100% (2020 22:42) (100% - 100%)    MEDICATIONS  (STANDING):  acetaminophen   Tablet .. 975 milliGRAM(s) Oral <User Schedule>  ascorbic acid 500 milliGRAM(s) Oral daily  diphtheria/tetanus/pertussis (acellular) Vaccine (ADAcel) 0.5 milliLiter(s) IntraMuscular once  ferrous    sulfate 325 milliGRAM(s) Oral three times a day  folic acid 1 milliGRAM(s) Oral daily  heparin  Injectable 5000 Unit(s) SubCutaneous every 12 hours  ibuprofen  Tablet. 600 milliGRAM(s) Oral every 6 hours  influenza   Vaccine 0.5 milliLiter(s) IntraMuscular once  prenatal multivitamin 1 Tablet(s) Oral daily    MEDICATIONS  (PRN):  diphenhydrAMINE 25 milliGRAM(s) Oral every 6 hours PRN Itching  glycerin Suppository - Adult 1 Suppository(s) Rectal at bedtime PRN Constipation  lanolin Ointment 1 Application(s) Topical every 6 hours PRN Sore Nipples  magnesium hydroxide Suspension 30 milliLiter(s) Oral two times a day PRN Constipation  oxyCODONE    IR 5 milliGRAM(s) Oral every 3 hours PRN Moderate to Severe Pain (4-10)  oxyCODONE    IR 5 milliGRAM(s) Oral once PRN Moderate to Severe Pain (4-10)  senna 1 Tablet(s) Oral two times a day PRN Constipation  simethicone 80 milliGRAM(s) Chew every 4 hours PRN Gas      LABS:  Blood type: B Positive  Rubella IgG: RPR: Negative                          8.0<L>   19.43<H> >-----------< 214    ( 07 @ 07:08 )             25.6<L>              Physical exam:  Gen: NAD  Abdomen: Soft, nontender, no distension , firm uterine fundus at umbilicus.  Incision: Clean, dry, and intact   Pelvic: Normal lochia noted  Ext: No calf tenderness

## 2020-02-21 PROBLEM — R42 DIZZINESS AND GIDDINESS: Chronic | Status: ACTIVE | Noted: 2020-02-05

## 2020-02-21 PROBLEM — G62.9 POLYNEUROPATHY, UNSPECIFIED: Chronic | Status: ACTIVE | Noted: 2020-02-05

## 2020-02-24 ENCOUNTER — APPOINTMENT (OUTPATIENT)
Dept: OBGYN | Facility: CLINIC | Age: 30
End: 2020-02-24

## 2020-03-25 NOTE — OB RN DELIVERY SUMMARY - NS_MATERNALMORBID_OBGYN_ALL_OB
Thank you for referring your patient to [facility] Maternal Fetal Medicine Diabetes Education Program      Glendy Hernandez is a  38 y o  female who presents today for Initial visit  Patient is at 32w1d gestation, Estimated Date of Delivery: 20  Nepali speaking patient  , ZACHARY 20  No history of prior GDM  In  had vaginal delivery at 38 weeks gestation, baby weighed 6 lbs plus  No [address] of diabetes  Reviewed and updated the following from patients medical record: Problem List, Allergies, and Current Medications  Diagnosis:  1  Gestational diabetes mellitus (GDM) in third trimester, gestational diabetes method of control unspecified     2  Abnormal glucose tolerance test (GTT) during pregnancy, antepartum     3  32 weeks gestation of pregnancy     4  Obesity affecting pregnancy in third trimester     5  BMI 33 0-33 9,adult     6  Multigravida of advanced maternal age in third trimester       Insulin requirements during pregnancy and basal/bolus concept  Discussed with patient what GDM is, pathophysiology of GDM, untreated GDM and maternal fetal complications including fetal macrosomia, shoulder dystocia,  hypoglycemia, polyhydramnios, increased incidence of  section, increase in blood pressure and stillbirth   Discussed importance of blood glucose monitoring, nutrition, and medication if necessary in achieving BG goals  Risk of developing T2DM and 6 weeks post partum diabetes screening should be completed       PMH/PSH:  Past Medical History:   Diagnosis Date    AMA (advanced maternal age) multigravida 35+     Anxiety     Asthma     as a child     Bilateral breast cysts 2007    Depression     Varicella      Past Surgical History:   Procedure Laterality Date    DILATION AND CURETTAGE OF UTERUS         Labs:  No components found for: HGA1C  No results found for: EAG  Lab Results   Component Value Date    [id number] 186 (H) 2020       Medications:  Current Outpatient Medications on File Prior to Visit   Medication Sig Dispense Refill    aspirin 81 MG tablet Take 2 tablets (162 mg total) by mouth daily 60 tablet 3    Prenatal MV-Min-Fe Fum-FA-DHA (PRENATAL 1 PO) Take by mouth       No current facility-administered medications on file prior to visit  Vitals:    20 1355   BP: 115/58   Pulse: 89       Recent Ultrasound Report:   DATE:3/24/20  Fetal Growth: Normal  KAYLEEN: Normal    Anthropometrics:  Ht Readings from Last 3 Encounters:   20 5' (1 524 m)   20 5' (1 524 m)   20 5' (1 524 m)     Wt Readings from Last 3 Encounters:   20 78 kg (172 lb)   20 78 5 kg (173 lb)   20 78 7 kg (173 lb 9 6 oz)       Pre-gravid weight: 167 lbs  Pre-gravid BMI: 33  Weight Change: 5 lbs  Weight gain recommendations: BMI (> 30) 11-20 lbs  Meal Plan (daily calorie and protein needs):  2000 calories ([id number]) (PRO: 2/3-1-3/4-1-3/4-2)  Protein:94 grams  Diet Instruction:  1  Patient was provided with a meal plan including 3 meals and 3 snacks  2  Discussed appropriate amounts of CHO, PRO, and Fat at each meal and snack  3  Reviewed CHO exchange list, and portion sizes for both CHO and PRO via food models  4  Instruction on how to read a food label  5  Provided suggested meal/snack options to increase nutrition and maintain consistent meal and snack intakes  6  Instructed on how to keep a 3-day food diary to be brought to follow- up appointment  7  Encouraged  patient to eat every 2 0-3 5 hours while awake  8  Encouraged patient to go no longer than 8-10 hours fasting overnight until first meal of the day  Blood Glucose Monitoring/Meter Teaching:   Glucose meter provided:  Accu-chek Guide  Blood sugar reading during demo:113mg/dL  Instructed on testing blood sugars: 4 x per day (Fasting, 2 hour after start of each meal)      Gave instruction on site selection, skin preparation, loading strips and lancet device, meter activation, obtaining blood sample, test strip and lancet disposal and storage, and recording log book entries  Patient has good understanding of material covered and was able to test their own blood sugar in office today  Instruction for reporting blood sugar results weekly via:   Phone: [phone]   Fax: [phone]   My Chart (Message, or Glucose tracking form)    Blood Sugar Goal  Ranges:    Fastin-90 mg/dL   1 hour after the start of each meal: 135 mg/dL or <   2 hours after start of each meal: 120 mg/dL or <       Physical Activity:    Discussed benefits of physical activity to optimize blood glucose control, encouraged activity at patient is physically able  Always consult a physician prior to starting an exercise program  Recommend 20-30 minutes daily  Goals:  1  Fasting BG 60-90 mg/dL  2  2 hr PP Blood Glucose <120 mg/dL (1 hr PP <135)  3  GDM Diet   4  Patient stated goal: "I will check my blood sugar 4 times each day, as directed by diabetes and pregnancy team"    Monitoring and Evaluation  1  Adherence to GDM diet  2  Blood Glucose Monitoring   3  Weight/ Body Composition    Nutrition Assessment Questionnaire:   Personal History of diabetes? no  Family History of diabetes? no  Previous diabetes education?: no  Do you follow any special diet presently?:no  Do you have any ethnic dietary preferences? Yes, root vegetable  Do you have any food allergies or intolerances? yes, lactose intolerant  Do you have any food aversions with this pregnancy? no  Food/Beverage Likes/Dislikes:Meat is an issue, will eat eggs, ham  Prefers more carbohydrate foods  Occupation (including hours worked): currently out of work  How many meals/snacks do you eat daily? 2 to 3 meals with 1 or 2 snacks  Times of Meals/Snacks: B:10:15 to 11:15 AM S:none L:3:00 PM S:6:00 PM or none D:6:00 PM S:7:30 PM   How often do you eat out, get take out, or eat fast food each week? Reports eating at home and not out        Patient Response to Instruction:  Learner/s Present:Learners Present: Patient   Educational Materials Provided: Diabetes in Pregnancy Booklet, Hypoglycemia Guidelines and Meal Plan 2000 calories  Teaching methods used: Listened to Instruction, Visualized Demonstration, Patient taught- back information , Label Reading and Meter Teaching/Demo  Patients Response to Education Information Provided: Voices Understanding, Was able to teach back information provided, Provided practical ways to integrate information learned into daily routine and Needs Reinforcement  Readiness to Change: Preparation (Preparing to make changes)      Begin Time: 2:00 PM  End Time: 2:40 PM    Class 2 (date): To be scheduled  Referring Provider: SAMREEN Martinez    It was a pleasure working with them today  Please feel free to call with any questions or concerns      SAMREEN Thompson, CDE, BC-ADM  [facility] Maternal Fetal Medicine  Diabetes in Pregnancy Program  [address] [place] [address],[address]  [address] None

## 2020-03-27 NOTE — ASU PREOP CHECKLIST - LATEX ALLERGY
-- Message is from the [facility] Contact Center--    Reason for Call:     Patient is requesting a return call from a nurse regarding her appointment scheduled for 03/30/2020.     Patient did also states she is interested in \"Televisit\" for 03/30/2020        Alternative phone number:     Turnaround time given to caller:   This message will be sent to [state Provider's name]. The clinical team will fulfill your request as soon as they review your message. Please be aware that you can also contact your physician by entering your message in [employer], our online portal.   no

## 2020-04-20 ENCOUNTER — APPOINTMENT (OUTPATIENT)
Dept: OBGYN | Facility: CLINIC | Age: 30
End: 2020-04-20

## 2020-06-29 NOTE — ED PROVIDER NOTE - MEDICAL DECISION MAKING DETAILS
No
26 y/o F with ha and pressure over shunt, s/p multiple shunt revisions in the past  -cbc, cmp, type and screen, coags,  shuntogram, stereotactic ct

## 2020-07-29 NOTE — ED PROVIDER NOTE - NS ED ATTENDING STATEMENT MOD
I have personally seen and examined this patient.  I have fully participated in the care of this patient. I have reviewed all pertinent clinical information, including history, physical exam, plan and the Resident’s note and agree except as noted. Walking/Standing

## 2020-08-19 ENCOUNTER — APPOINTMENT (OUTPATIENT)
Dept: OBGYN | Facility: CLINIC | Age: 30
End: 2020-08-19
Payer: MEDICAID

## 2020-08-19 PROCEDURE — 99395 PREV VISIT EST AGE 18-39: CPT

## 2020-09-28 ENCOUNTER — APPOINTMENT (OUTPATIENT)
Dept: OBGYN | Facility: CLINIC | Age: 30
End: 2020-09-28
Payer: MEDICAID

## 2020-09-28 PROCEDURE — 58300 INSERT INTRAUTERINE DEVICE: CPT

## 2020-10-26 ENCOUNTER — APPOINTMENT (OUTPATIENT)
Dept: ANTEPARTUM | Facility: CLINIC | Age: 30
End: 2020-10-26
Payer: MEDICAID

## 2020-10-26 PROCEDURE — 76856 US EXAM PELVIC COMPLETE: CPT

## 2020-10-26 PROCEDURE — 99072 ADDL SUPL MATRL&STAF TM PHE: CPT

## 2020-10-26 PROCEDURE — 76830 TRANSVAGINAL US NON-OB: CPT

## 2020-10-26 PROCEDURE — 76376 3D RENDER W/INTRP POSTPROCES: CPT

## 2020-11-03 NOTE — ED ADULT NURSE NOTE - RESPIRATORY RATE (BREATHS/MIN)
11/3/2020         RE: Jc Lei  [address]  [place] [address]        Dear Colleague,    Thank you for referring your patient, Jc Lei, to the [facility] [place]. Please see a copy of my visit note below.    BMT Teaching Flowsheet    Jc Lei is a 64 year old male  The encounter diagnosis was MDS (myelodysplastic syndrome) (H).    Teaching Topic: 2015-32    Person(s) involved in teaching: Patient and Caregiver  Motivation Level  Asks Questions: Yes  Eager to Learn: Yes  Cooperative: Yes  Receptive (willing/able to accept information): Yes  Any cultural factors/Confucianist beliefs that may influence understanding or compliance? No    Patient and Caregiver demonstrates understanding of the following:  - Reason for the appointment, diagnosis and treatment plan: Yes  - Knowledge of proper use of medications and conditions for which they are ordered (with special attention to potential side effects or drug interactions): Yes  - Which situations necessitate calling provider and whom to contact: Yes    Teaching concerns addressed: None    Proper use and care of (medical equipment, care aids, etc.) Yes  Pain management techniques: Yes  Patient instructed on hand hygiene: Yes  How and/when to access community resources: Yes    Infection Control:  Patient and Caregiver demonstrates understanding of the following:  Surgical procedure site care taught Yes  Signs and symptoms of infection taught Yes  Wound care taught NA  Central venous catheter care taught No, explain: will attend [facility]    Instructional Materials Used/Given:   [employer]ogenic BMT work up binder including sample transplant calendar, consents, information about outpatient clinic, inpatient unit, medications, when to call for help, when you leave the hospital, and GVHD. All questions answered. Teach back provided.   For  [facility] patient wallet card given. Patient  instructed to remain within close proximity (at least two hours) for at least four weeks post infusion.    Research participant Jc Lei was provided the information on the Research Participant Information Sheet by Monica Burden RN on November 3, 2020. This document contains information regarding the risks of participating in a research study during the COVID-19 pandemic. Receipt of the information sheet was confirmed by study personnel prior to the visit.  Time spent with patient: 110 minutes.    Specific Concerns: NA      Again, thank you for allowing me to participate in the care of your patient.        Sincerely,        BMT Nurse Coordinator     16

## 2021-01-18 NOTE — OB PROVIDER TRIAGE NOTE - NS_SPECEXAM_OBGYN_ALL_OB
Patient woke up with 103 fever.  With tylenol it went down to 100.8 but now is going up.  She stated she has a tummy ache.    Please call mother.   No

## 2021-03-22 NOTE — ED CDU PROVIDER NOTE - PMH
Craniosynostosis    Meniere disease  Pt  discontinued   triamterene/HCTZ,  stopped March. Pt states MD aware  Obstructive hydrocephalus   shunt placed at birth with multiple revisions for malfunction. LAST REVISION 02/2015  Personal History of Hydrocephalus (ICD9 V12.49)    Strabismus
Patient

## 2021-06-02 NOTE — ED PROVIDER NOTE - INPATIENT RESIDENT/ACP NOTIFIED DATE
INFECTIOUS DISEASES AND INTERNAL MEDICINE at [place]  =======================================================  Talon Charlton MD  Diplomates American Board of Internal Medicine and Infectious Diseases  Telephone [phone]  Fax            [phone]  =======================================================    LUCIA ROSS [id number]    Follow up: right  finger infection    Allergies:  No Known Allergies      Medications:  acetaminophen   Tablet .. 650 milliGRAM(s) Oral every 4 hours PRN  aluminum hydroxide/magnesium hydroxide/simethicone Suspension 30 milliLiter(s) Oral every 4 hours PRN  atorvastatin 80 milliGRAM(s) Oral at bedtime  cefTRIAXone   IVPB 1000 milliGRAM(s) IV Intermittent every 24 hours  dextrose 40% Gel 15 Gram(s) Oral once  dextrose 5%. 1000 milliLiter(s) IV Continuous <Continuous>  dextrose 5%. 1000 milliLiter(s) IV Continuous <Continuous>  dextrose 50% Injectable 25 Gram(s) IV Push once  dextrose 50% Injectable 12.5 Gram(s) IV Push once  dextrose 50% Injectable 25 Gram(s) IV Push once  enoxaparin Injectable 40 milliGRAM(s) SubCutaneous daily  glucagon  Injectable 1 milliGRAM(s) IntraMuscular once  HYDROmorphone  Injectable 0.5 milliGRAM(s) IV Push every 3 hours PRN  insulin lispro (ADMELOG) corrective regimen sliding scale   SubCutaneous three times a day before meals  ondansetron Injectable 4 milliGRAM(s) IV Push every 6 hours PRN  oxyCODONE    IR 5 milliGRAM(s) Oral every 4 hours PRN  saccharomyces boulardii 250 milliGRAM(s) Oral two times a day  vancomycin  IVPB 1000 milliGRAM(s) IV Intermittent every 12 hours    SOCIAL       FAMILY   FAMILY HISTORY:  Family history of emphysema (Mother, Sibling)  mother    Family history of heart failure (Sibling)  Fen Fen caused heart failure      REVIEW OF SYSTEMS:  CONSTITUTIONAL:  No Fever or chills  HEENT:   No diplopia or blurred vision.  No earache, sore throat or runny nose.  CARDIOVASCULAR:  No pressure, squeezing, strangling, tightness, heaviness or aching about the chest, neck, axilla or epigastrium.  RESPIRATORY:  No cough, shortness of breath, PND or orthopnea.  GASTROINTESTINAL:  No nausea, vomiting or diarrhea.  GENITOURINARY:  No dysuria, frequency or urgency. No Blood in urine  MUSCULOSKELETAL:   as per hpi  SKIN:  No change in skin, hair or nails.  NEUROLOGIC:  No paresthesias, fasciculations, seizures or weakness.  PSYCHIATRIC:  No disorder of thought or mood.  ENDOCRINE:  No heat or cold intolerance, polyuria or polydipsia.  HEMATOLOGICAL:  No easy bruising or bleeding.            Physical Exam:  ICU Vital Signs Last 24 Hrs  T(C): 37.2 (02 Jun 2021 07:49), Max: 37.4 (01 Jun 2021 20:39)  T(F): 98.9 (02 Jun 2021 07:49), Max: 99.4 (01 Jun 2021 20:39)  HR: 76 (02 Jun 2021 07:49) (75 - 88)  BP: 117/81 (02 Jun 2021 07:49) (113/76 - 155/80)  BP(mean): --  ABP: --  ABP(mean): --  RR: 18 (02 Jun 2021 07:49) (16 - 20)  SpO2: 93% (02 Jun 2021 07:49) (93% - 100%)    GEN: NAD,   HEENT: normocephalic and atraumatic. EOMI. NICK.    NECK: Supple. No carotid bruits.  No lymphadenopathy or thyromegaly.  LUNGS: Clear to auscultation.  HEART: Regular rate and rhythm without murmur.  ABDOMEN: Soft, nontender, and nondistended.  Positive bowel sounds.    : No CVA tenderness  EXTREMITIES: decreased edeam adn erythema  MSK: no joint swelling  NEUROLOGIC: Cranial nerves II through XII are grossly intact.  PSYCHIATRIC: Appropriate affect .  SKIN: No ulceration or induration present.        Labs:  Vitals:  ============  T(F): 98.9 (02 Jun 2021 07:49), Max: 99.4 (01 Jun 2021 20:39)  HR: 76 (02 Jun 2021 07:49)  BP: 117/81 (02 Jun 2021 07:49)  RR: 18 (02 Jun 2021 07:49)  SpO2: 93% (02 Jun 2021 07:49) (93% - 100%)  temp max in last 48H T(F): , Max: 99.4 (06-01-21 @ 20:39)    =======================================================  Current Antibiotics:  cefTRIAXone   IVPB 1000 milliGRAM(s) IV Intermittent every 24 hours  vancomycin  IVPB 1000 milliGRAM(s) IV Intermittent every 12 hours    Other medications:  atorvastatin 80 milliGRAM(s) Oral at bedtime  dextrose 40% Gel 15 Gram(s) Oral once  dextrose 5%. 1000 milliLiter(s) IV Continuous <Continuous>  dextrose 5%. 1000 milliLiter(s) IV Continuous <Continuous>  dextrose 50% Injectable 25 Gram(s) IV Push once  dextrose 50% Injectable 12.5 Gram(s) IV Push once  dextrose 50% Injectable 25 Gram(s) IV Push once  enoxaparin Injectable 40 milliGRAM(s) SubCutaneous daily  glucagon  Injectable 1 milliGRAM(s) IntraMuscular once  insulin lispro (ADMELOG) corrective regimen sliding scale   SubCutaneous three times a day before meals  saccharomyces boulardii 250 milliGRAM(s) Oral two times a day      =======================================================  Labs:                        15.7   8.99  )-----------( 218      ( 01 Jun 2021 11:13 )             47.3     06-01    137  |  102  |  19.5  ----------------------------<  128<H>  4.6   |  25.0  |  0.82    Ca    9.5      01 Jun 2021 11:13    TPro  7.4  /  Alb  4.5  /  TBili  0.5  /  DBili  x   /  AST  24  /  ALT  23  /  AlkPhos  76  06-01      Creatinine, Serum: 0.82 mg/dL (06-01-21 @ 11:13)          C-Reactive Protein, Serum: 7 mg/L (06-01-21 @ 11:15)    WBC Count: 8.99 K/uL (06-01-21 @ 11:13)      COVID-19 PCR: NotDetec (06-01-21 @ 11:15)      Alkaline Phosphatase, Serum: 76 U/L (06-01-21 @ 11:13)  Alanine Aminotransferase (ALT/SGPT): 23 U/L (06-01-21 @ 11:13)  Aspartate Aminotransferase (AST/SGOT): 24 U/L (06-01-21 @ 11:13)  Bilirubin Total, Serum: 0.5 mg/dL (06-01-21 @ 11:13)  
Ortho PA note    Name: LUCIA ROSS    MR #: [id number]    Patient being followed for right 3rd finger infection   Surgeon: Dr Leroy     Pt comfortable without complaints, pain controlled. States improvement   Denies CP, SOB, N/V, numbness/tingling     General Exam:  Vital Signs Last 24 Hrs  T(C): 37.2 (06-02-21 @ 07:49), Max: 37.2 (06-02-21 @ 07:49)  T(F): 98.9 (06-02-21 @ 07:49), Max: 98.9 (06-02-21 @ 07:49)  HR: 76 (06-02-21 @ 07:49) (76 - 76)  BP: 117/81 (06-02-21 @ 07:49) (117/81 - 117/81)  BP(mean): --  RR: 18 (06-02-21 @ 07:49) (18 - 18)  SpO2: 93% (06-02-21 @ 07:49) (93% - 93%)    General: Pt Alert and oriented, NAD, controlled pain.  Right middle finger has mild- moderate erythema diffusely. Dorsally noted more distally at nail bed. ROM is improving and now has full extension.   Cap refill < 2 sec.  Sensation: Grossly intact to light touch without deficit.          A/P: 65yMale with right 3rd finer infection/ paronychia- improving w Iv abx   - Stable  - Pain Control  -  abx: as per ID   - Ortho hand stable for dc with outpatient f/u in 5-7 days  
31-Aug-2017 13:38

## 2021-06-21 ENCOUNTER — APPOINTMENT (OUTPATIENT)
Dept: OBGYN | Facility: CLINIC | Age: 31
End: 2021-06-21

## 2021-08-26 NOTE — H&P PST ADULT - GASTROINTESTINAL DETAILS
Form complete. Please fax back   no distention/bowel sounds normal/soft/nontender/no masses palpable

## 2021-10-11 ENCOUNTER — OUTPATIENT (OUTPATIENT)
Dept: OUTPATIENT SERVICES | Facility: HOSPITAL | Age: 31
LOS: 1 days | End: 2021-10-11
Payer: MEDICAID

## 2021-10-11 ENCOUNTER — APPOINTMENT (OUTPATIENT)
Dept: MRI IMAGING | Facility: HOSPITAL | Age: 31
End: 2021-10-11
Payer: MEDICAID

## 2021-10-11 ENCOUNTER — APPOINTMENT (OUTPATIENT)
Dept: RADIOLOGY | Facility: HOSPITAL | Age: 31
End: 2021-10-11

## 2021-10-11 ENCOUNTER — OUTPATIENT (OUTPATIENT)
Dept: OUTPATIENT SERVICES | Facility: HOSPITAL | Age: 31
LOS: 1 days | End: 2021-10-11

## 2021-10-11 DIAGNOSIS — G91.9 HYDROCEPHALUS, UNSPECIFIED: ICD-10-CM

## 2021-10-11 DIAGNOSIS — Z98.890 OTHER SPECIFIED POSTPROCEDURAL STATES: Chronic | ICD-10-CM

## 2021-10-11 PROCEDURE — 71045 X-RAY EXAM CHEST 1 VIEW: CPT | Mod: 26

## 2021-10-11 PROCEDURE — 74018 RADEX ABDOMEN 1 VIEW: CPT | Mod: 26

## 2021-10-11 PROCEDURE — 70250 X-RAY EXAM OF SKULL: CPT | Mod: 26

## 2021-10-11 PROCEDURE — 70551 MRI BRAIN STEM W/O DYE: CPT | Mod: 26

## 2021-10-15 ENCOUNTER — EMERGENCY (EMERGENCY)
Facility: HOSPITAL | Age: 31
LOS: 1 days | Discharge: HOME | End: 2021-10-15
Attending: STUDENT IN AN ORGANIZED HEALTH CARE EDUCATION/TRAINING PROGRAM | Admitting: STUDENT IN AN ORGANIZED HEALTH CARE EDUCATION/TRAINING PROGRAM
Payer: MEDICAID

## 2021-10-15 VITALS
HEIGHT: 64 IN | SYSTOLIC BLOOD PRESSURE: 128 MMHG | HEART RATE: 97 BPM | DIASTOLIC BLOOD PRESSURE: 87 MMHG | RESPIRATION RATE: 16 BRPM | TEMPERATURE: 99 F | OXYGEN SATURATION: 99 %

## 2021-10-15 VITALS
OXYGEN SATURATION: 99 % | RESPIRATION RATE: 16 BRPM | HEART RATE: 86 BPM | TEMPERATURE: 98 F | SYSTOLIC BLOOD PRESSURE: 146 MMHG | DIASTOLIC BLOOD PRESSURE: 84 MMHG

## 2021-10-15 DIAGNOSIS — Z98.890 OTHER SPECIFIED POSTPROCEDURAL STATES: Chronic | ICD-10-CM

## 2021-10-15 LAB
ALBUMIN SERPL ELPH-MCNC: 4.8 G/DL — SIGNIFICANT CHANGE UP (ref 3.3–5)
ALP SERPL-CCNC: 106 U/L — SIGNIFICANT CHANGE UP (ref 40–120)
ALT FLD-CCNC: 24 U/L — SIGNIFICANT CHANGE UP (ref 4–33)
ANION GAP SERPL CALC-SCNC: 13 MMOL/L — SIGNIFICANT CHANGE UP (ref 7–14)
APPEARANCE UR: ABNORMAL
AST SERPL-CCNC: 15 U/L — SIGNIFICANT CHANGE UP (ref 4–32)
B PERT DNA SPEC QL NAA+PROBE: SIGNIFICANT CHANGE UP
B PERT+PARAPERT DNA PNL SPEC NAA+PROBE: SIGNIFICANT CHANGE UP
BACTERIA # UR AUTO: ABNORMAL
BASE EXCESS BLDV CALC-SCNC: -1 MMOL/L — SIGNIFICANT CHANGE UP (ref -2–3)
BASOPHILS # BLD AUTO: 0.01 K/UL — SIGNIFICANT CHANGE UP (ref 0–0.2)
BASOPHILS NFR BLD AUTO: 0.1 % — SIGNIFICANT CHANGE UP (ref 0–2)
BILIRUB SERPL-MCNC: 0.3 MG/DL — SIGNIFICANT CHANGE UP (ref 0.2–1.2)
BILIRUB UR-MCNC: NEGATIVE — SIGNIFICANT CHANGE UP
BORDETELLA PARAPERTUSSIS (RAPRVP): SIGNIFICANT CHANGE UP
BUN SERPL-MCNC: 11 MG/DL — SIGNIFICANT CHANGE UP (ref 7–23)
C PNEUM DNA SPEC QL NAA+PROBE: SIGNIFICANT CHANGE UP
CA-I SERPL-SCNC: 1.14 MMOL/L — LOW (ref 1.15–1.33)
CALCIUM SERPL-MCNC: 9.1 MG/DL — SIGNIFICANT CHANGE UP (ref 8.4–10.5)
CHLORIDE BLDV-SCNC: 108 MMOL/L — SIGNIFICANT CHANGE UP (ref 96–108)
CHLORIDE SERPL-SCNC: 106 MMOL/L — SIGNIFICANT CHANGE UP (ref 98–107)
CO2 BLDV-SCNC: 25.6 MMOL/L — SIGNIFICANT CHANGE UP (ref 22–26)
CO2 SERPL-SCNC: 22 MMOL/L — SIGNIFICANT CHANGE UP (ref 22–31)
COLOR SPEC: YELLOW — SIGNIFICANT CHANGE UP
CREAT SERPL-MCNC: 0.82 MG/DL — SIGNIFICANT CHANGE UP (ref 0.5–1.3)
DIFF PNL FLD: NEGATIVE — SIGNIFICANT CHANGE UP
EOSINOPHIL # BLD AUTO: 0.02 K/UL — SIGNIFICANT CHANGE UP (ref 0–0.5)
EOSINOPHIL NFR BLD AUTO: 0.2 % — SIGNIFICANT CHANGE UP (ref 0–6)
EPI CELLS # UR: 11 /HPF — HIGH (ref 0–5)
FLUAV SUBTYP SPEC NAA+PROBE: SIGNIFICANT CHANGE UP
FLUBV RNA SPEC QL NAA+PROBE: SIGNIFICANT CHANGE UP
GAS PNL BLDV: 139 MMOL/L — SIGNIFICANT CHANGE UP (ref 136–145)
GAS PNL BLDV: SIGNIFICANT CHANGE UP
GLUCOSE BLDV-MCNC: 98 MG/DL — SIGNIFICANT CHANGE UP (ref 70–99)
GLUCOSE SERPL-MCNC: 95 MG/DL — SIGNIFICANT CHANGE UP (ref 70–99)
GLUCOSE UR QL: NEGATIVE — SIGNIFICANT CHANGE UP
HADV DNA SPEC QL NAA+PROBE: SIGNIFICANT CHANGE UP
HCO3 BLDV-SCNC: 24 MMOL/L — SIGNIFICANT CHANGE UP (ref 22–29)
HCOV 229E RNA SPEC QL NAA+PROBE: SIGNIFICANT CHANGE UP
HCOV HKU1 RNA SPEC QL NAA+PROBE: SIGNIFICANT CHANGE UP
HCOV NL63 RNA SPEC QL NAA+PROBE: SIGNIFICANT CHANGE UP
HCOV OC43 RNA SPEC QL NAA+PROBE: SIGNIFICANT CHANGE UP
HCT VFR BLD CALC: 41.1 % — SIGNIFICANT CHANGE UP (ref 34.5–45)
HCT VFR BLDA CALC: 38 % — SIGNIFICANT CHANGE UP (ref 34.5–46.5)
HGB BLD CALC-MCNC: 12.5 G/DL — SIGNIFICANT CHANGE UP (ref 11.5–15.5)
HGB BLD-MCNC: 13.2 G/DL — SIGNIFICANT CHANGE UP (ref 11.5–15.5)
HMPV RNA SPEC QL NAA+PROBE: SIGNIFICANT CHANGE UP
HPIV1 RNA SPEC QL NAA+PROBE: SIGNIFICANT CHANGE UP
HPIV2 RNA SPEC QL NAA+PROBE: SIGNIFICANT CHANGE UP
HPIV3 RNA SPEC QL NAA+PROBE: SIGNIFICANT CHANGE UP
HPIV4 RNA SPEC QL NAA+PROBE: SIGNIFICANT CHANGE UP
HYALINE CASTS # UR AUTO: 4 /LPF — SIGNIFICANT CHANGE UP (ref 0–7)
IANC: 4.42 K/UL — SIGNIFICANT CHANGE UP (ref 1.5–8.5)
IMM GRANULOCYTES NFR BLD AUTO: 0.2 % — SIGNIFICANT CHANGE UP (ref 0–1.5)
KETONES UR-MCNC: NEGATIVE — SIGNIFICANT CHANGE UP
LACTATE BLDV-MCNC: 1.4 MMOL/L — SIGNIFICANT CHANGE UP (ref 0.5–2)
LEUKOCYTE ESTERASE UR-ACNC: ABNORMAL
LIDOCAIN IGE QN: 34 U/L — SIGNIFICANT CHANGE UP (ref 7–60)
LYMPHOCYTES # BLD AUTO: 3.51 K/UL — HIGH (ref 1–3.3)
LYMPHOCYTES # BLD AUTO: 39.7 % — SIGNIFICANT CHANGE UP (ref 13–44)
M PNEUMO DNA SPEC QL NAA+PROBE: SIGNIFICANT CHANGE UP
MCHC RBC-ENTMCNC: 23.3 PG — LOW (ref 27–34)
MCHC RBC-ENTMCNC: 32.1 GM/DL — SIGNIFICANT CHANGE UP (ref 32–36)
MCV RBC AUTO: 72.5 FL — LOW (ref 80–100)
MONOCYTES # BLD AUTO: 0.86 K/UL — SIGNIFICANT CHANGE UP (ref 0–0.9)
MONOCYTES NFR BLD AUTO: 9.7 % — SIGNIFICANT CHANGE UP (ref 2–14)
NEUTROPHILS # BLD AUTO: 4.42 K/UL — SIGNIFICANT CHANGE UP (ref 1.8–7.4)
NEUTROPHILS NFR BLD AUTO: 50.1 % — SIGNIFICANT CHANGE UP (ref 43–77)
NITRITE UR-MCNC: NEGATIVE — SIGNIFICANT CHANGE UP
NRBC # BLD: 0 /100 WBCS — SIGNIFICANT CHANGE UP
NRBC # FLD: 0 K/UL — SIGNIFICANT CHANGE UP
PCO2 BLDV: 42 MMHG — SIGNIFICANT CHANGE UP (ref 39–42)
PH BLDV: 7.37 — SIGNIFICANT CHANGE UP (ref 7.32–7.43)
PH UR: 6.5 — SIGNIFICANT CHANGE UP (ref 5–8)
PLATELET # BLD AUTO: 360 K/UL — SIGNIFICANT CHANGE UP (ref 150–400)
PO2 BLDV: 40 MMHG — SIGNIFICANT CHANGE UP
POTASSIUM BLDV-SCNC: 3.8 MMOL/L — SIGNIFICANT CHANGE UP (ref 3.5–5.1)
POTASSIUM SERPL-MCNC: 3.9 MMOL/L — SIGNIFICANT CHANGE UP (ref 3.5–5.3)
POTASSIUM SERPL-SCNC: 3.9 MMOL/L — SIGNIFICANT CHANGE UP (ref 3.5–5.3)
PROT SERPL-MCNC: 8 G/DL — SIGNIFICANT CHANGE UP (ref 6–8.3)
PROT UR-MCNC: ABNORMAL
RAPID RVP RESULT: SIGNIFICANT CHANGE UP
RBC # BLD: 5.67 M/UL — HIGH (ref 3.8–5.2)
RBC # FLD: 14.8 % — HIGH (ref 10.3–14.5)
RBC CASTS # UR COMP ASSIST: 5 /HPF — HIGH (ref 0–4)
RSV RNA SPEC QL NAA+PROBE: SIGNIFICANT CHANGE UP
RV+EV RNA SPEC QL NAA+PROBE: SIGNIFICANT CHANGE UP
SAO2 % BLDV: 67.1 % — SIGNIFICANT CHANGE UP
SARS-COV-2 RNA SPEC QL NAA+PROBE: SIGNIFICANT CHANGE UP
SODIUM SERPL-SCNC: 141 MMOL/L — SIGNIFICANT CHANGE UP (ref 135–145)
SP GR SPEC: 1.03 — SIGNIFICANT CHANGE UP (ref 1–1.05)
UROBILINOGEN FLD QL: SIGNIFICANT CHANGE UP
WBC # BLD: 8.84 K/UL — SIGNIFICANT CHANGE UP (ref 3.8–10.5)
WBC # FLD AUTO: 8.84 K/UL — SIGNIFICANT CHANGE UP (ref 3.8–10.5)
WBC UR QL: 24 /HPF — HIGH (ref 0–5)

## 2021-10-15 PROCEDURE — 74177 CT ABD & PELVIS W/CONTRAST: CPT | Mod: 26

## 2021-10-15 PROCEDURE — 93010 ELECTROCARDIOGRAM REPORT: CPT

## 2021-10-15 PROCEDURE — 71045 X-RAY EXAM CHEST 1 VIEW: CPT | Mod: 26

## 2021-10-15 PROCEDURE — 99285 EMERGENCY DEPT VISIT HI MDM: CPT | Mod: 25

## 2021-10-15 RX ORDER — FAMOTIDINE 10 MG/ML
20 INJECTION INTRAVENOUS ONCE
Refills: 0 | Status: COMPLETED | OUTPATIENT
Start: 2021-10-15 | End: 2021-10-15

## 2021-10-15 RX ORDER — KETOROLAC TROMETHAMINE 30 MG/ML
15 SYRINGE (ML) INJECTION ONCE
Refills: 0 | Status: DISCONTINUED | OUTPATIENT
Start: 2021-10-15 | End: 2021-10-15

## 2021-10-15 RX ORDER — ACETAMINOPHEN 500 MG
650 TABLET ORAL ONCE
Refills: 0 | Status: COMPLETED | OUTPATIENT
Start: 2021-10-15 | End: 2021-10-15

## 2021-10-15 RX ORDER — CEFTRIAXONE 500 MG/1
1000 INJECTION, POWDER, FOR SOLUTION INTRAMUSCULAR; INTRAVENOUS ONCE
Refills: 0 | Status: COMPLETED | OUTPATIENT
Start: 2021-10-15 | End: 2021-10-15

## 2021-10-15 RX ORDER — SODIUM CHLORIDE 9 MG/ML
1000 INJECTION INTRAMUSCULAR; INTRAVENOUS; SUBCUTANEOUS ONCE
Refills: 0 | Status: COMPLETED | OUTPATIENT
Start: 2021-10-15 | End: 2021-10-15

## 2021-10-15 RX ORDER — ACETAMINOPHEN 500 MG
1 TABLET ORAL
Qty: 28 | Refills: 0
Start: 2021-10-15 | End: 2021-10-21

## 2021-10-15 RX ORDER — CEPHALEXIN 500 MG
1 CAPSULE ORAL
Qty: 14 | Refills: 0
Start: 2021-10-15 | End: 2021-10-21

## 2021-10-15 RX ORDER — LIDOCAINE 4 G/100G
10 CREAM TOPICAL ONCE
Refills: 0 | Status: COMPLETED | OUTPATIENT
Start: 2021-10-15 | End: 2021-10-15

## 2021-10-15 RX ORDER — ONDANSETRON 8 MG/1
1 TABLET, FILM COATED ORAL
Qty: 21 | Refills: 0
Start: 2021-10-15 | End: 2021-10-21

## 2021-10-15 RX ADMIN — Medication 30 MILLILITER(S): at 20:47

## 2021-10-15 RX ADMIN — FAMOTIDINE 20 MILLIGRAM(S): 10 INJECTION INTRAVENOUS at 20:47

## 2021-10-15 RX ADMIN — SODIUM CHLORIDE 1000 MILLILITER(S): 9 INJECTION INTRAMUSCULAR; INTRAVENOUS; SUBCUTANEOUS at 18:56

## 2021-10-15 RX ADMIN — Medication 15 MILLIGRAM(S): at 20:47

## 2021-10-15 RX ADMIN — Medication 650 MILLIGRAM(S): at 20:37

## 2021-10-15 RX ADMIN — CEFTRIAXONE 100 MILLIGRAM(S): 500 INJECTION, POWDER, FOR SOLUTION INTRAMUSCULAR; INTRAVENOUS at 20:47

## 2021-10-15 RX ADMIN — Medication 650 MILLIGRAM(S): at 18:56

## 2021-10-15 RX ADMIN — LIDOCAINE 10 MILLILITER(S): 4 CREAM TOPICAL at 20:47

## 2021-10-15 NOTE — ED PROVIDER NOTE - PROGRESS NOTE DETAILS
JUSTIN ALFONSO: Patient signed out to me from JUSTIN Irving to f/u generalized abd pain, pending CT A/P, Ua+ for UTI, given Ceftriaxone 1g. Pt also received Tylenol, Toradol, Maalox, Pepcid, Viscous lidocaine 2% and 1L IVF bolus. CT A/P performed, pending results. Will continue to monitor and reassess. PA NATY: CT A/P show no acute intra-abd pathology. Discussed with attending, patient can be discharged home to f/u with PCP, a course of Keflex for UTI. The patient was given verbal and written discharge instructions. Specifically, instructions when to return to the ED and when to seek follow-up from their pcp was discussed. Any specialty follow-up was discussed, including how to make an appointment.  Instructions were discussed in simple, plain language and was understood by the patient. The patient understands that should their symptoms worsen or any new symptoms arise, they should return to the ED immediately for further evaluation. All pt's questions were answered. Patient verbalizes understanding.

## 2021-10-15 NOTE — ED PROVIDER NOTE - NSICDXPASTSURGICALHX_GEN_ALL_CORE_FT
PAST SURGICAL HISTORY:  H/O eye surgery Repair of lazy eyes 1995    S/P craniotomy cranial vault expansion    Ventriculo-Peritoneal Shunt Status (ICD9 V45.2)  shunt placed at birth with multiple revisions 1990 2002 x2 2003 2004 2007 2009 2011 2015 2016,2017

## 2021-10-15 NOTE — ED ADULT NURSE NOTE - OBJECTIVE STATEMENT
Pt arriving to intake room 5 A&OX4 ambulatory with a cane at baseline c/o dull constant abdominal pain x 3 days. Pt endorsing urinary incontinence. Hx  shunt. Pt states her neurologist told her the  shunt "looks OK." Pt denies changes in bowel movements, falls, trauma/injury to back. Endorsing nausea without vomiting. Unsure of LMP, Pt on IUD and has irregular periods. Pt appears in NAD. Resp even and unlabored. 20g IV placed in LAC. Labs drawn and sent. Medicated as per EMAR. Mother at bedside. Pt transported to XR.

## 2021-10-15 NOTE — ED PROVIDER NOTE - NSFOLLOWUPINSTRUCTIONS_ED_ALL_ED_FT
Follow up with your PMD within 48-72 hours.  Take Keflex 500 mg twice a day for 7 days -- finish this antibiotic. Increase fluids. Take Tylenol 650mg (Two 325 mg pills) every 4-6 hours as needed for pain. Worsening pain, new fever, chills, nausea, vomiting OR ANY NEW CONCERNING SYMPTOMS return to the Emergency department.

## 2021-10-15 NOTE — ED PROVIDER NOTE - CHIEF COMPLAINT
The patient is a 30y Female complaining of  The patient is a 30y Female complaining of abdominal pain.

## 2021-10-15 NOTE — ED PROVIDER NOTE - NSICDXPASTMEDICALHX_GEN_ALL_CORE_FT
PAST MEDICAL HISTORY:  Craniosynostosis     Neuropathy R side of the body    Obstructive hydrocephalus  shunt placed at birth with multiple revisions for malfunction. LAST REVISION 02/2015    Personal History of Hydrocephalus (ICD9 V12.49)     Strabismus     Vertigo

## 2021-10-15 NOTE — ED PROVIDER NOTE - PATIENT PORTAL LINK FT
You can access the FollowMyHealth Patient Portal offered by [facility] by registering at the following website: [email or web address]. By joining [employer]’s FollowMyHealth portal, you will also be able to view your health information using other applications (apps) compatible with our system.

## 2021-10-15 NOTE — ED PROVIDER NOTE - NS ED ROS FT
Constitutional: (-) Fever, (-) Chills, (+) Anorexia, (-) Weight Loss, (+) Fatigue, (-) Night sweats  CV: (-) Chest pain,  (-) Syncope  Resp: (-) Cough, (-) Shortness of breath  GI: (+) Abdominal pain, (+) Nausea, (-) Vomiting, (-) Diarrhea, (-) Constipation, (-) Melena, (-) BRBPR, (+) heartburn  : (-) Dysuria, (-) Hematuria, (+) Increased frequency, (+) Incontinence,   MSK: (+) Weakness  Neuro: (-) Loss of consciousness, (-) Headache, (-) Seizure, (-) tremors, (-) convulsions, (-) gait abnormalities, (+) paresthesia

## 2021-10-15 NOTE — ED PROVIDER NOTE - CLINICAL SUMMARY MEDICAL DECISION MAKING FREE TEXT BOX
29 y/o F w/ PMHx of postpartum depression and hydrocephalus w/  shunt presents today complaining of abdominal pain and incontinence for the last 2-3 weeks. Reports abdominal pain is constant, dull, aching 7/10 pain, which she rates as 7/10 and is associated with nausea. Reports daily urinary incontinence without fever, chills, painful urination or increased frequency. Denies aggravating or alleviating factors. Chronic headaches without new neuro changes. Vital signs stable. No focal neuro deficit. Will get labs, meds, CT, reassess. Dispo pending results.

## 2021-10-15 NOTE — ED PROVIDER NOTE - OBJECTIVE STATEMENT
29 y/o F w/ PMHx of postpartum depression and hydrocephalus w/  shunt presents today complaining of abdominal pain and incontinence for the last 2-3 weeks. States she developed incontinence followed by abdominal pain. Reports abdominal pain is constant, dull, aching 7/10 pain, which she rates as 7/10 and is associated with nausea. Reports daily urinary incontinence without fever, chills, painful urination or increased frequency. Denies aggravating or alleviating factors. Patient reports chronic headaches, citing that last week she had a "headache that would not let up" that was worse than usual and unimproved with Tylenol. She denies any LOC, paralysis, seizures, tremors, convulsions, new gait disturbances (pt walks with a cane at baseline), or new numbness/tingling (pt admits to R-sided neuropathy 2/2 one of her brain surgeries). Recent MR and shunt series showed no abnormalities with shunt.

## 2021-10-15 NOTE — ED ADULT TRIAGE NOTE - CHIEF COMPLAINT QUOTE
Pt presents to ED c/o stomach pain, nausea and incontinence x2-3wks. Pmhx of hydrocephalus. Pt states "I spoke to my neurosurgeon on the way here and he said he looked at the MRI of my shunt that I had on Monday and everything was okay so it could be something else." Denies fevers, chills.

## 2021-10-15 NOTE — ED PROVIDER NOTE - ATTENDING CONTRIBUTION TO CARE
29 y/o F with PMH of Hydrocephalus s/p  shunt revision 2/2015 p/w stomach pain worsened over 2-3 weeks. pt states she had mri which was completed on monday and showed no acute change. She denies fever chills.  She states she had a shunt series that was normal as well. Pt states pain is located to her lower abdomen and intermittent. LMP she is unsure of she has an IUD. She denies fever, chills, but reports dysuria. She states she feels like she has to go to the bathroom and has urge incontinence. She follows dr. ryan neuro   denies fever, chills, chest pain, SOB, +abdominal pain, diarrhea, +dysuria, syncope, bleeding, new rash,weakness, numbness, blurred vision    ROS  otherwise negative as per HPI  Gen: Awake, Alert, WD, WN, NAD  Head:  NC/AT  Eyes:  PERRL, EOMI, Conjunctiva pink, lids normal, no scleral icterus  ENT: OP clear, no exudates, no erythema,  moist mucus membranes  Neck: supple, nontender, no meningismus, no JVD, trachea midline  Cardiac/CV:  S1 S2, RRR, no M/G/R  Respiratory/Pulm:  CTAB, good air movement, normal resp effort, no wheezes/stridor/retractions/rales/rhonchi  Gastrointestinal/Abdomen:  Soft, nontender, nondistended, +BS, no rebound/guarding  Back:  no CVAT, no MLT  Ext:  warm, well perfused, moving all extremities spontaneously, no peripheral edema, distal pulses intact  Skin: intact, no rash  Neuro:  AAOx3, sensation intact, motor 5/5 x 4 extremities, normal gait, speech clear  MDM as above

## 2021-10-15 NOTE — ED PROVIDER NOTE - NEUROLOGICAL, MLM
Alert and oriented, no focal deficits, no motor or sensory deficits. Ambulates with cane at baseline.

## 2021-10-18 LAB
CULTURE RESULTS: SIGNIFICANT CHANGE UP
SPECIMEN SOURCE: SIGNIFICANT CHANGE UP

## 2021-10-19 NOTE — ED POST DISCHARGE NOTE - RESULT SUMMARY
culture grew 3 or more types of organisms  which indicate collection contamination, consider recollection only if clinically indicated. Patient discharged home with a prescription for Keflex. Discussed with patient UCX resulst and no sr . Discussed with patient to follow up with MD for repeat UA/UCX. Discussed with patient need to return to ED if symptoms don't continue to improve or recur or develops any new or worsening symptoms that are of concern.

## 2021-12-15 NOTE — ED ADULT TRIAGE NOTE - NS ED NURSE DIRECT TO ROOM YN
Problem: Mobility Impaired (Adult and Pediatric)  Goal: *Acute Goals and Plan of Care (Insert Text)  Outcome: Progressing Towards Goal  Note: GOALS (1-4 days):  (1.)Mr. Kevin Loving will move from supine to sit and sit to supine  in bed with STAND BY ASSIST.  (2.)Mr. Kevin Loving will transfer from bed to chair and chair to bed with STAND BY ASSIST using the least restrictive device. (3.)Mr. Kevin Loving will ambulate with STAND BY ASSIST for 350 feet with the least restrictive device. (4.)Mr. Kevin Loving will increase left knee ROM to 0°-90°.  ________________________________________________________________________________________________        PHYSICAL THERAPY JOINT CAMP TKA: Daily Note and PM 12/15/2021  INPATIENT: Hospital Day: 7  [place] / [place] / Product Type: Medicare /      NAME/AGE/GENDER: Aj Devlin is a 79 y.o. male   PRIMARY DIAGNOSIS:  Left knee infection   Procedure(s) and Anesthesia Type:     * INCISION AND DRAINAGE LEFT KNEE/ POLY EXCHANGE/ POSSIBLE PROSETHSIS EXPLANT W/ CEMENT SPACER - General     * POSS LEFT KNEE ARTHROPLASTY TOTAL - Spinal (Left  Left)  ICD-10: Treatment Diagnosis:    · Pain in Left Knee (M25.562)  · Stiffness of Left Knee, Not elsewhere classified (M25.662)  · Difficulty in walking, Not elsewhere classified (R26.2)      ASSESSMENT:     Mr. Kevin Loving presents with decreased strength and range of motion left lower extremity and with decreased independence with functional mobility s/p left TKA. Pt will benefit from skilled PT interventions to maximize independence with functional mobility and TKA management. Pt did fairly well with assessment. He is slow and needs extra time to complete tasks. He was supine on contact. He transferred out of bed and ambulated to the bathroom for toileting then ambulated 175 ft in the hallways with RW and CGA. He returned to the room and sat in the recliner to perform therex. Pt instructed not to get up without assistance.  Hope to progress mobility and exercises in the morning. Pt plans to discharge to rehab with continued therapy for follow up. He wants to go to [facility] and is wanting to talk with the SW.  12/11- up in chair with daughter present. He was more sore this afternoon but agreeable to therapy. Co treat with OT due to increased need for mobility. He ambulated 100 ft with RW and CGA then returned to the room and went to the bathroom and then showered. He practiced scooting, sitting/ standing balance and sit to stand while showering then returned to the chair to kaylin his shoes. Left up in room with OT for grooming. Progressing therapy today. 12/12- patient hallucinating bugs today. Supine on contact and agreeable to therapy. Patient is pleasant but extra time needed to complete all tasks with therapy due to being easily distracted and perseveration on tasks. He transferred supine to sit insisting on using the trapeze bar even though I advised he would do better without it. He discovered he was unable to get up with the bar so he used the rails and required Min assist. Sit to stand with Mod assist and then ambulated into the bathroom for brushing teeth and combing hair. Ambulated 130 ft with RW and CGA then returned to the room and transferred into the chair for therex. Needed frequent breaks with therex, as RN was changing his central line dressing during therapy. He completed therex and ice was applied. Left up in chair awaiting tele-neurology consult for dysarthria. Progressed with gait distance today. PM- sitting on EOB with nursing wanting to go to the bathroom. Ambulated with therapist to the bathroom where he toileted, washed hands and combed his hair. He is very short of breath this afternoon with activity. He ambulated 80 ft with RW and then returned to the room. Practiced sit to stand multiple times to kaylin brief then returned supine and positioned for comfort. Tele-neuro back on to see patient this afternoon.    12/13 sitting in the chair with alarm on. Therapist had to keep redirecting him during L knee exercises in the chair. Work on sit>stand with Mod A. Then walk 90 ft using RW with CGA and verbal cues. Rested few min then headed back to the room. Return to the chair with needs in reach, alarm on, ice to L knee and instructed to call for assist.  Going to rehab.  12/13 pm sitting in the chair upon arrival.  Performs B knee exercises with guidance. Then walk 30 ft to the stretcher using RW with CGA and verbal cues. Got on the stretcher with Min A with B LE. Left to go down stairs. 12/14 sitting in the chair upon arrival.    Performs L knee exercises with guidance, because he kept falling a sleep. Work on sit>stand with Mod A and (extra time). Did not walk, because we could not get to standing position. 12/14 sitting in the chair upon arrival.  Performs L knee exercises with guidance, to stay awake. Work on sit>stand x 2 with Mod A x 2 extra time. Took few steps forward/backward with Mod A x 2 extra time. Pt was left in chair with needs in reach and instructed to call for assist.  Daughter told therapist the reason why he need more assist, because he was exhausted and had no sleep. Daughter stated when you finish, he is going to take a nap.  12/15 lying on right side upon arrival.  Work on bed mobility as follows:supine>eob with Mod A x 2 (extra time). Work on sit>stand x 3 with Mod-Min A with verbal cues for hand placement and stay on task. Stood @ the walk to void in the urinal with Min A.  Nurse and therapist clean bottom and applied a new brief. Then pt started talking about his earring aides, therapist had to redirect to stay on task. Then went 12 ft to the chair using RW with Min A x 2 (extra time). Then work on L knee exercises with constant verbal verbal cues to stay on task. He kept falling a sleep during exercises.   Left in chair with needs in reach, alarm on and instructed to call for assist. Hopefully go to rehab today. 12/15 pm supine upon arrival.  Performs L knee exercises in bed with guidance. Pt remain in the bed with needs in reach, bed alarm on and instructed to call for assist.  Sade Montana will pick him for rehab today. This section established at most recent assessment   PROBLEM LIST (Impairments causing functional limitations):  1. Decreased Strength  2. Decreased ADL/Functional Activities  3. Decreased Transfer Abilities  4. Decreased Ambulation Ability/Technique  5. Decreased Flexibility/Joint Mobility  6. Edema/Girth  7. Decreased [place] with Home Exercise Program   INTERVENTIONS PLANNED: (Benefits and precautions of physical therapy have been discussed with the patient.)  1. Bed Mobility  2. Cold  3. Gait Training  4. Home Exercise Program (HEP)  5. Range of Motion (ROM)  6. Therapeutic Activites  7. Therapeutic Exercise/Strengthening  8. Transfer Training     TREATMENT PLAN: Frequency/Duration: Follow patient BID for duration of hospital stay to address above goals. Rehabilitation Potential For Stated Goals: Good     RECOMMENDED REHABILITATION/EQUIPMENT: (at time of discharge pending progress): Continue Skilled Therapy and Rehab.               HISTORY:   History of Present Injury/Illness (Reason for Referral):  Pt s/p total knee arthroplasty revision on L LE    Past Medical History/Comorbidities:   Mr. Abel Porter  has a past medical history of Acute encephalopathy (12/12/2021), Anemia (fall 2016), Arthritis, Atrial fibrillation ([address] (12/10/2021), CAD (coronary artery disease) (1997), Carotid stenosis (04/2017), Coagulopathy ([address] (12/10/2021), Diabetes ([address] (05/1997), Diverticulosis (12/2011), GERD (gastroesophageal reflux disease), Glaucoma, Heart murmur, Hiatal hernia, History of kidney stones, Hypercholesteremia, Hypertension, Hypothyroid, Infection of total joint prosthesis ([address] (12/9/2021), MI (myocardial infarction) ([address] TIA (transient ischemic attack) (07/2016), Unspecified sleep apnea, Urethral stenosis, and Vasovagal syncope. He has no past medical history of Adverse effect of anesthesia, Aneurysm ([address] Asthma, Autoimmune disease ([address] Cancer ([address] Chronic kidney disease, Chronic pain, COPD, Dementia, Difficult intubation, Endocarditis, Gastrointestinal disorder, Heart failure ([address] Ill-defined condition, Liver disease, Malignant hyperthermia due to anesthesia, Morbid obesity ([address] Nausea & vomiting, Other ill-defined conditions(799.89), Pseudocholinesterase deficiency, Psychiatric disorder, PUD (peptic ulcer disease), Rheumatic fever, Seizures ([address] Sleep disorder, Stroke ([address] or Thromboembolus ([address] Mr. Kassie Figueredo  has a past surgical history that includes hx heart catheterization (6643,7665, 2012, 2013); hx tonsillectomy (1938); hx cataract removal (1987 and 1990); hx lap cholecystectomy (2003); hx hernia repair (1944); hx urological (1996); hx orthopaedic (Left); hx orthopaedic (6057-1653); hx shoulder arthroscopy (Left, 2015); hx knee replacement (Bilateral, 2002, 2008); hx hemorrhoidectomy (1987); hx heent (Right); hx carpal tunnel release (Right, 1968); hx carpal tunnel release (Left, 2012); hx bunionectomy (Left); hx rotator cuff repair (Right, 1995); hx rotator cuff repair (Left, 2000); hx rotator cuff repair (Right, 2007); and hx total colectomy (01/13/2017). Social History/Living Environment:   Living Alone: Yes  Support Systems: Child(wojciech)  Patient Expects to be Discharged to[de-identified] Rehabilitation facility  Prior Level of Function/Work/Activity:  Independent prior to admit. Lives alone. May have caregivers that assist at home but unsure how frequently   Number of Personal Factors/Comorbidities that affect the Plan of Care: 0: LOW COMPLEXITY   EXAMINATION:   Most Recent Physical Functioning:                            Bed Mobility  Supine to Sit: Moderate assistance; Additional time;Assist x2    Transfers  Sit to Stand: Minimum assistance; Moderate No assistance; Additional time;Assist x2 (x 3)  Stand to Sit: Minimum assistance; Moderate assistance; Additional time;Assist x2 (x 3)    Balance  Sitting: Intact; High guard  Standing: Pull to stand; With support              Weight Bearing Status  Left Side Weight Bearing: As tolerated  Distance (ft): 12 Feet (ft)  Ambulation - Level of Assistance: Minimal assistance; Additional time;Assist x2 (with verbal cues to stay on track)  Assistive Device: Walker, rolling  Speed/Tracy: Pace decreased (<100 feet/min)  Step Length: Left shortened;Right shortened  Stance: Left decreased  Gait Abnormalities: Decreased step clearance  Interventions: Safety awareness training     Braces/Orthotics: none    Left Knee Cold  Type:  (does not like ice)      Body Structures Involved:  1. Joints  2. Muscles Body Functions Affected:  1. Movement Related Activities and Participation Affected:  1. Mobility  2. Self Care   Number of elements that affect the Plan of Care: 4+: HIGH COMPLEXITY   CLINICAL PRESENTATION:   Presentation: Stable and uncomplicated: LOW COMPLEXITY   CLINICAL DECISION MAKIN [facility] [address] [id number] AM-PAC 6 Clicks   Basic Mobility Inpatient Short Form  How much difficulty does the patient currently have. .. Unable A Lot A Little None   1. Turning over in bed (including adjusting bedclothes, sheets and blankets)? [] 1   [] 2   [x] 3   [] 4   2. Sitting down on and standing up from a chair with arms ( e.g., wheelchair, bedside commode, etc.)   [] 1   [] 2   [x] 3   [] 4   3. Moving from lying on back to sitting on the side of the bed? [] 1   [] 2   [x] 3   [] 4   How much help from another person does the patient currently need. .. Total A Lot A Little None   4. Moving to and from a bed to a chair (including a wheelchair)? [] 1   [] 2   [x] 3   [] 4   5. Need to walk in hospital room? [] 1   [] 2   [x] 3   [] 4   6. Climbing 3-5 steps with a railing?    [] 1   [] 2   [x] 3   [] 4   © , Trustees of [facility], under license to [employer]. All rights reserved     Score:  Initial: 18 Most Recent: X (Date: -- )    Interpretation of Tool:  Represents activities that are increasingly more difficult (i.e. Bed mobility, Transfers, Gait). Medical Necessity:     · Patient is expected to demonstrate progress in   · strength, range of motion, and functional technique  ·  to   · decrease assistance required with functional mobility and TKA managment  · .  Reason for Services/Other Comments:  · Patient continues to require skilled intervention due to   · Inability to complete functional mobility and TKA management independently  · . Use of outcome tool(s) and clinical judgement create a POC that gives a: Clear prediction of patient's progress: LOW COMPLEXITY            TREATMENT:   (In addition to Assessment/Re-Assessment sessions the following treatments were rendered)     Pre-treatment Symptoms/Complaints:  agreeable  Pain Initial:   Pain Intensity 1: 1 (more sore after therapy)  Post Session:      Therapeutic Activity: (  0 Minutes ):  Therapeutic activities including bed mobility, sit to stand, Chair transfers, standing balance,, and  Ambulation on level groundto improve mobility, strength, and balance. Required minimal Safety awareness training to promote dynamic balance in standing. Therapeutic Exercise: (15 Minutes):  Exercises per grid below to improve strength and balance. Required minimal verbal cues to promote proper body alignment. Progressed range as indicated. Gait Training ( ):  Gait training to improve and/or restore physical functioning as related to mobility. Ambulated 12 Feet (ft) with Minimal assistance; Additional time;Assist x2 (with verbal cues to stay on track) using a Walker, rolling and minimal Safety awareness training related to their knee position and motion to promote proper body alignment.            Date:  12/11 Date:  12/12 Date:  12/13   Date:  12/15 Date:  12/15 pm ACTIVITY/EXERCISE AM PM AM PM AM PM       []  []  []  []  []  []     Ankle Pumps 15  15  20 20 25 25   Quad Sets 15  15  20 20 25 25   Gluteal Sets 15  15  20 20 25 25   Hip ABd/ADduction 15  15  20 20 25 aa 25 aa   Straight Leg Raises 15  15  20 20 25 aa 25 aa   Knee Slides 15  15  20 20 25 aa 25 aa   Short Arc Quads 15  15  20 20 25 aa 25 aa   Chair Slides     20  25                          B = bilateral; AA = active assistive; A = active; P = passive      Treatment/Session Assessment:     Response to Treatment:  Pt kept falling a sleep, will leave for rehab    Education:  [x] Home Exercises  [x] Fall Precautions  [x] Use of Cold Therapy Unit [] D/C Instruction Review  [] Knee Prosthesis Review  [x] Walker Management/Safety [] Adaptive Equipment as Needed  [x] No pillow under knee       Interdisciplinary Collaboration:   o Physical Therapy Assistant  o Registered Nurse    After treatment position/precautions:   o Supine in bed  o RN notified    Compliance with Program/Exercises: Compliant all of the time. Recommendations/Intent for next treatment session:  Treatment next visit will focus on increasing Mr. Treva Quezada independence with bed mobility, transfers, gait training, strength/ROM exercises, modalities for pain, and patient education.       Total Treatment Duration:  PT Patient Time In/Time Out  Time In: 1345  Time Out: 920 Susy Ramey PTA

## 2022-03-04 NOTE — PROGRESS NOTE ADULT - REASON FOR ADMISSION
postpartum
postpartum
General Sunscreen Counseling: I recommended a broad spectrum sunscreen with a SPF of 30 or higher.  I explained that SPF 30 sunscreens block approximately 97 percent of the sun's harmful rays.  Sunscreens should be applied at least 15 minutes prior to expected sun exposure and then every 2 hours after that as long as sun exposure continues. If swimming or exercising sunscreen should be reapplied every 45 minutes to an hour after getting wet or sweating.  One ounce, or the equivalent of a shot glass full of sunscreen, is adequate to protect the skin not covered by a bathing suit. I also recommended a lip balm with a sunscreen as well. Sun protective clothing can be used in lieu of sunscreen but must be worn the entire time you are exposed to the sun's rays.
Detail Level: Generalized
Products Recommended: Mineral Based Sunscreens

## 2022-03-11 NOTE — OB RN TRIAGE NOTE - NS_OBACUITY_OBGYN_ALL_OB_DT
Eat healthy foods you enjoy. Rivaroxaban/Xarelto DOES NOT have a special diet. Limit your alcohol intake.
05-Feb-2020 06:14

## 2022-03-27 NOTE — ED PROVIDER NOTE - DATE/TIME 2
Use ibuprofen for pain and Ultram for breakthrough pain.  Zofran for nausea.  Follow up with her doctor 1-2 days for re-evaluation.  Return as needed for any worsening symptoms, problems, questions or concerns.   15-Oct-2021 21:58

## 2022-07-27 NOTE — ED PROVIDER NOTE - EYES, MLM
Patient states she does not want blood drawn for HIV/Hep   Clear bilaterally, pupils equal, round and reactive to light.

## 2022-10-19 ENCOUNTER — EMERGENCY (EMERGENCY)
Facility: HOSPITAL | Age: 32
LOS: 1 days | Discharge: HOME | End: 2022-10-19
Attending: EMERGENCY MEDICINE | Admitting: EMERGENCY MEDICINE

## 2022-10-19 VITALS
HEIGHT: 64 IN | SYSTOLIC BLOOD PRESSURE: 144 MMHG | HEART RATE: 97 BPM | DIASTOLIC BLOOD PRESSURE: 102 MMHG | TEMPERATURE: 99 F | RESPIRATION RATE: 18 BRPM | OXYGEN SATURATION: 100 %

## 2022-10-19 DIAGNOSIS — Z98.890 OTHER SPECIFIED POSTPROCEDURAL STATES: Chronic | ICD-10-CM

## 2022-10-19 PROCEDURE — 99285 EMERGENCY DEPT VISIT HI MDM: CPT

## 2022-10-19 PROCEDURE — 99282 EMERGENCY DEPT VISIT SF MDM: CPT

## 2022-10-19 NOTE — ED ADULT TRIAGE NOTE - CHIEF COMPLAINT QUOTE
pt c/o sensitivity to  shunt valve, headache, nausea, decreased PO, fever tMAX 100.8. PMHx hydrocephalus,  shunt

## 2022-10-19 NOTE — ED ADULT TRIAGE NOTE - AS TEMP SITE
Call to patient.  Made aware Dr. Gandhi placed order for pelvic ultrasound to check IUD placement.  Gave her scheduling number to schedule appointment [facility] imaging.  Leandra Slade RN     oral

## 2022-10-20 VITALS
TEMPERATURE: 99 F | HEART RATE: 78 BPM | RESPIRATION RATE: 18 BRPM | OXYGEN SATURATION: 100 % | DIASTOLIC BLOOD PRESSURE: 80 MMHG | SYSTOLIC BLOOD PRESSURE: 129 MMHG

## 2022-10-20 LAB
ALBUMIN SERPL ELPH-MCNC: 4.2 G/DL — SIGNIFICANT CHANGE UP (ref 3.3–5)
ALP SERPL-CCNC: 93 U/L — SIGNIFICANT CHANGE UP (ref 40–120)
ALT FLD-CCNC: 18 U/L — SIGNIFICANT CHANGE UP (ref 4–33)
ANION GAP SERPL CALC-SCNC: 10 MMOL/L — SIGNIFICANT CHANGE UP (ref 7–14)
APPEARANCE UR: CLEAR — SIGNIFICANT CHANGE UP
AST SERPL-CCNC: 12 U/L — SIGNIFICANT CHANGE UP (ref 4–32)
B PERT DNA SPEC QL NAA+PROBE: SIGNIFICANT CHANGE UP
B PERT+PARAPERT DNA PNL SPEC NAA+PROBE: SIGNIFICANT CHANGE UP
BASOPHILS # BLD AUTO: 0 K/UL — SIGNIFICANT CHANGE UP (ref 0–0.2)
BASOPHILS NFR BLD AUTO: 0 % — SIGNIFICANT CHANGE UP (ref 0–2)
BILIRUB SERPL-MCNC: 0.4 MG/DL — SIGNIFICANT CHANGE UP (ref 0.2–1.2)
BILIRUB UR-MCNC: NEGATIVE — SIGNIFICANT CHANGE UP
BLOOD GAS VENOUS COMPREHENSIVE RESULT: SIGNIFICANT CHANGE UP
BORDETELLA PARAPERTUSSIS (RAPRVP): SIGNIFICANT CHANGE UP
BUN SERPL-MCNC: 9 MG/DL — SIGNIFICANT CHANGE UP (ref 7–23)
C PNEUM DNA SPEC QL NAA+PROBE: SIGNIFICANT CHANGE UP
CALCIUM SERPL-MCNC: 8.9 MG/DL — SIGNIFICANT CHANGE UP (ref 8.4–10.5)
CHLORIDE SERPL-SCNC: 104 MMOL/L — SIGNIFICANT CHANGE UP (ref 98–107)
CO2 SERPL-SCNC: 23 MMOL/L — SIGNIFICANT CHANGE UP (ref 22–31)
COLOR SPEC: YELLOW — SIGNIFICANT CHANGE UP
CREAT SERPL-MCNC: 0.81 MG/DL — SIGNIFICANT CHANGE UP (ref 0.5–1.3)
DIFF PNL FLD: NEGATIVE — SIGNIFICANT CHANGE UP
EGFR: 98 ML/MIN/1.73M2 — SIGNIFICANT CHANGE UP
EOSINOPHIL # BLD AUTO: 0.08 K/UL — SIGNIFICANT CHANGE UP (ref 0–0.5)
EOSINOPHIL NFR BLD AUTO: 0.9 % — SIGNIFICANT CHANGE UP (ref 0–6)
FLUAV SUBTYP SPEC NAA+PROBE: SIGNIFICANT CHANGE UP
FLUBV RNA SPEC QL NAA+PROBE: SIGNIFICANT CHANGE UP
GLUCOSE SERPL-MCNC: 93 MG/DL — SIGNIFICANT CHANGE UP (ref 70–99)
GLUCOSE UR QL: NEGATIVE — SIGNIFICANT CHANGE UP
HADV DNA SPEC QL NAA+PROBE: SIGNIFICANT CHANGE UP
HCG SERPL-ACNC: <5 MIU/ML — SIGNIFICANT CHANGE UP
HCOV 229E RNA SPEC QL NAA+PROBE: SIGNIFICANT CHANGE UP
HCOV HKU1 RNA SPEC QL NAA+PROBE: SIGNIFICANT CHANGE UP
HCOV NL63 RNA SPEC QL NAA+PROBE: SIGNIFICANT CHANGE UP
HCOV OC43 RNA SPEC QL NAA+PROBE: SIGNIFICANT CHANGE UP
HCT VFR BLD CALC: 37.8 % — SIGNIFICANT CHANGE UP (ref 34.5–45)
HEMOLYSIS INDEX: 9 — SIGNIFICANT CHANGE UP
HGB BLD-MCNC: 11.9 G/DL — SIGNIFICANT CHANGE UP (ref 11.5–15.5)
HMPV RNA SPEC QL NAA+PROBE: SIGNIFICANT CHANGE UP
HPIV1 RNA SPEC QL NAA+PROBE: SIGNIFICANT CHANGE UP
HPIV2 RNA SPEC QL NAA+PROBE: SIGNIFICANT CHANGE UP
HPIV3 RNA SPEC QL NAA+PROBE: SIGNIFICANT CHANGE UP
HPIV4 RNA SPEC QL NAA+PROBE: SIGNIFICANT CHANGE UP
IANC: 4.51 K/UL — SIGNIFICANT CHANGE UP (ref 1.8–7.4)
KETONES UR-MCNC: ABNORMAL
LEUKOCYTE ESTERASE UR-ACNC: NEGATIVE — SIGNIFICANT CHANGE UP
LYMPHOCYTES # BLD AUTO: 3.85 K/UL — HIGH (ref 1–3.3)
LYMPHOCYTES # BLD AUTO: 41.2 % — SIGNIFICANT CHANGE UP (ref 13–44)
M PNEUMO DNA SPEC QL NAA+PROBE: SIGNIFICANT CHANGE UP
MCHC RBC-ENTMCNC: 22.8 PG — LOW (ref 27–34)
MCHC RBC-ENTMCNC: 31.5 GM/DL — LOW (ref 32–36)
MCV RBC AUTO: 72.3 FL — LOW (ref 80–100)
MONOCYTES # BLD AUTO: 0.5 K/UL — SIGNIFICANT CHANGE UP (ref 0–0.9)
MONOCYTES NFR BLD AUTO: 5.3 % — SIGNIFICANT CHANGE UP (ref 2–14)
NEUTROPHILS # BLD AUTO: 4.26 K/UL — SIGNIFICANT CHANGE UP (ref 1.8–7.4)
NEUTROPHILS NFR BLD AUTO: 45.6 % — SIGNIFICANT CHANGE UP (ref 43–77)
NITRITE UR-MCNC: NEGATIVE — SIGNIFICANT CHANGE UP
PH UR: 6.5 — SIGNIFICANT CHANGE UP (ref 5–8)
PLATELET # BLD AUTO: 326 K/UL — SIGNIFICANT CHANGE UP (ref 150–400)
POTASSIUM SERPL-MCNC: 2.9 MMOL/L — CRITICAL LOW (ref 3.5–5.3)
POTASSIUM SERPL-MCNC: 4 MMOL/L — SIGNIFICANT CHANGE UP (ref 3.5–5.3)
POTASSIUM SERPL-SCNC: 2.9 MMOL/L — CRITICAL LOW (ref 3.5–5.3)
POTASSIUM SERPL-SCNC: 4 MMOL/L — SIGNIFICANT CHANGE UP (ref 3.5–5.3)
PROT SERPL-MCNC: 6.9 G/DL — SIGNIFICANT CHANGE UP (ref 6–8.3)
PROT UR-MCNC: ABNORMAL
RAPID RVP RESULT: SIGNIFICANT CHANGE UP
RBC # BLD: 5.23 M/UL — HIGH (ref 3.8–5.2)
RBC # FLD: 14.4 % — SIGNIFICANT CHANGE UP (ref 10.3–14.5)
RSV RNA SPEC QL NAA+PROBE: SIGNIFICANT CHANGE UP
RV+EV RNA SPEC QL NAA+PROBE: SIGNIFICANT CHANGE UP
SARS-COV-2 RNA SPEC QL NAA+PROBE: SIGNIFICANT CHANGE UP
SODIUM SERPL-SCNC: 137 MMOL/L — SIGNIFICANT CHANGE UP (ref 135–145)
SP GR SPEC: 1.03 — SIGNIFICANT CHANGE UP (ref 1.01–1.05)
UROBILINOGEN FLD QL: SIGNIFICANT CHANGE UP
WBC # BLD: 9.34 K/UL — SIGNIFICANT CHANGE UP (ref 3.8–10.5)
WBC # FLD AUTO: 9.34 K/UL — SIGNIFICANT CHANGE UP (ref 3.8–10.5)

## 2022-10-20 PROCEDURE — 77011 CT SCAN FOR LOCALIZATION: CPT | Mod: 26

## 2022-10-20 PROCEDURE — 70250 X-RAY EXAM OF SKULL: CPT | Mod: 26

## 2022-10-20 PROCEDURE — 71046 X-RAY EXAM CHEST 2 VIEWS: CPT | Mod: 26

## 2022-10-20 PROCEDURE — 74019 RADEX ABDOMEN 2 VIEWS: CPT | Mod: 26

## 2022-10-20 RX ORDER — ONDANSETRON 8 MG/1
4 TABLET, FILM COATED ORAL ONCE
Refills: 0 | Status: COMPLETED | OUTPATIENT
Start: 2022-10-20 | End: 2022-10-20

## 2022-10-20 RX ORDER — POTASSIUM CHLORIDE 20 MEQ
40 PACKET (EA) ORAL ONCE
Refills: 0 | Status: COMPLETED | OUTPATIENT
Start: 2022-10-20 | End: 2022-10-20

## 2022-10-20 RX ORDER — MAGNESIUM SULFATE 500 MG/ML
1 VIAL (ML) INJECTION ONCE
Refills: 0 | Status: COMPLETED | OUTPATIENT
Start: 2022-10-20 | End: 2022-10-20

## 2022-10-20 RX ORDER — SODIUM CHLORIDE 9 MG/ML
1000 INJECTION INTRAMUSCULAR; INTRAVENOUS; SUBCUTANEOUS ONCE
Refills: 0 | Status: COMPLETED | OUTPATIENT
Start: 2022-10-20 | End: 2022-10-20

## 2022-10-20 RX ORDER — DIPHENHYDRAMINE HCL 50 MG
25 CAPSULE ORAL ONCE
Refills: 0 | Status: COMPLETED | OUTPATIENT
Start: 2022-10-20 | End: 2022-10-20

## 2022-10-20 RX ORDER — SODIUM CHLORIDE 9 MG/ML
1000 INJECTION, SOLUTION INTRAVENOUS ONCE
Refills: 0 | Status: COMPLETED | OUTPATIENT
Start: 2022-10-20 | End: 2022-10-20

## 2022-10-20 RX ORDER — ACETAMINOPHEN 500 MG
1000 TABLET ORAL ONCE
Refills: 0 | Status: DISCONTINUED | OUTPATIENT
Start: 2022-10-20 | End: 2022-10-23

## 2022-10-20 RX ORDER — METOCLOPRAMIDE HCL 10 MG
10 TABLET ORAL ONCE
Refills: 0 | Status: COMPLETED | OUTPATIENT
Start: 2022-10-20 | End: 2022-10-20

## 2022-10-20 RX ORDER — POTASSIUM CHLORIDE 20 MEQ
1 PACKET (EA) ORAL
Qty: 30 | Refills: 0
Start: 2022-10-20 | End: 2022-11-03

## 2022-10-20 RX ORDER — POTASSIUM CHLORIDE 20 MEQ
10 PACKET (EA) ORAL
Refills: 0 | Status: COMPLETED | OUTPATIENT
Start: 2022-10-20 | End: 2022-10-20

## 2022-10-20 RX ADMIN — Medication 100 MILLIEQUIVALENT(S): at 05:34

## 2022-10-20 RX ADMIN — Medication 100 MILLIEQUIVALENT(S): at 08:11

## 2022-10-20 RX ADMIN — Medication 100 GRAM(S): at 05:34

## 2022-10-20 RX ADMIN — Medication 100 MILLIEQUIVALENT(S): at 06:46

## 2022-10-20 RX ADMIN — Medication 10 MILLIGRAM(S): at 03:14

## 2022-10-20 RX ADMIN — ONDANSETRON 4 MILLIGRAM(S): 8 TABLET, FILM COATED ORAL at 10:40

## 2022-10-20 RX ADMIN — SODIUM CHLORIDE 1000 MILLILITER(S): 9 INJECTION, SOLUTION INTRAVENOUS at 04:09

## 2022-10-20 RX ADMIN — Medication 25 MILLIGRAM(S): at 03:14

## 2022-10-20 RX ADMIN — ONDANSETRON 4 MILLIGRAM(S): 8 TABLET, FILM COATED ORAL at 05:33

## 2022-10-20 RX ADMIN — Medication 40 MILLIEQUIVALENT(S): at 04:10

## 2022-10-20 RX ADMIN — SODIUM CHLORIDE 1000 MILLILITER(S): 9 INJECTION INTRAMUSCULAR; INTRAVENOUS; SUBCUTANEOUS at 05:33

## 2022-10-20 NOTE — ED ADULT NURSE REASSESSMENT NOTE - NS ED NURSE REASSESS COMMENT FT1
AAOx4, no signs of distress, pending discharge, fall precautions maintained
AAOx4, no signs of distress, pending optho consult, fall precautions maintained
Patient A&Ox4, resting in stretcher, respirations even and unlabored. Patient states improvement in condition. Vitals stable as noted. Patient awaiting results and dispo. Stretcher in lowest position, wheels locked, appropriate side rails in place, call bell in reach. Mother at bedside.
Patient resting in stretcher, respirations even and unlabored. Patient comfortable. Vitals stable as noted. Medications administered and running per orders. Awaiting dispo. Stretcher in lowest position, wheels locked, appropriate side rails in place, call bell in reach.

## 2022-10-20 NOTE — ED PROVIDER NOTE - PATIENT PORTAL LINK FT
You can access the FollowMyHealth Patient Portal offered by [facility] by registering at the following website: [email or web address]. By joining [employer]’s FollowMyHealth portal, you will also be able to view your health information using other applications (apps) compatible with our system.

## 2022-10-20 NOTE — ED PROVIDER NOTE - CARE PLAN
1 Principal Discharge DX:	Headache   Principal Discharge DX:	Headache  Secondary Diagnosis:	 (ventriculoperitoneal) shunt status

## 2022-10-20 NOTE — CONSULT NOTE ADULT - ASSESSMENT
INCOMPLETE NOTE, FINAL RECS TO FOLLOW    Assessment and Recommendations:  32y female w/ pmhx/ochx of  hydrocephalus with  shunt and multiple revisions  consulted for papilledema. Posterior segment exam with 20D lens after dilation revealed ***.   # Papilledema eval  - Vision intact, no sign of optic nerve dysfunction  - CT with stable  shunt  - Posterior segment exam shows ***  - ***  - Rest of care per primary team/neurosurgery  - Findings and plan discussed with patient and primary team.    Patient seen and discussed with  ***    Outpatient follow-up: Patient should follow-up with his/her ophthalmologist or with Health system Department of Ophthalmology at the address below     [address] [place] [address]. [address]  [place], [place] [id number] [phone]     INCOMPLETE NOTE, FINAL RECS TO FOLLOW    Assessment and Recommendations:  32y female w/ pmhx/ochx of  hydrocephalus with  shunt and multiple revisions  consulted for papilledema. Posterior segment exam with 20D lens after dilation revealed no evidence of optic nerve swelling.   # Papilledema eval  - Vision intact, no sign of optic nerve dysfunction  - CT with stable  shunt  - Posterior segment exam shows no evidence of optic nerve swelling  - Absence of disc swelling does not rule out elevated intracranial pressure   - ***  - Rest of care per primary team/neurosurgery  - Findings and plan discussed with patient and primary team.    Patient seen and discussed with  ***    Outpatient follow-up: Patient should follow-up with his/her ophthalmologist or with [facility] Department of Ophthalmology at the address below     [address] [place] [address]. [address]  [place] [address]  [phone]     Assessment and Recommendations:  32y female w/ pmhx/ochx of  hydrocephalus with  shunt and multiple revisions  consulted for papilledema. Posterior segment exam with 20D lens after dilation revealed no evidence of optic nerve swelling.   # Papilledema eval  - Vision intact, no sign of optic nerve dysfunction  - CT with stable  shunt  - Posterior segment exam shows no evidence of optic nerve swelling  - Absence of disc swelling does not rule out elevated intracranial pressure   - Rest of care per primary team/neurosurgery  - Findings and plan discussed with patient and primary team.    Patient seen and discussed with Dr. Brennan    Outpatient follow-up: Patient should follow-up with his/her ophthalmologist or with [facility] Department of Ophthalmology at the address below     [address] [place] [address]. [address]  [place] [address]  [phone]

## 2022-10-20 NOTE — ED PROVIDER NOTE - ATTENDING CONTRIBUTION TO CARE
32F h/o hydrocephalus,  shunt placed here, over past week having HA x 1 week a/w nausea and decr PO intake and fever to 100.8, as well as neck pain x 1 week along site of shunt course.  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  No syncope.  Tender along  shunt.  Firmness to R side of neck.  Prev h/o  malfunction.  (+) sick contact.  No visual change. plan check labs ct shuntogram nsx eval.  NSx saw pt no evidence of shunt malfunction, pt and family still concerned, Nsx recc ophtho for eval of papilledema.  If that is negative OK for d/c home f/u NSx as outpt.  VS:  unremarkable except HTN    GEN - mild distress HA, malaise;   A+O x3   HEAD - NC/AT   - shunt site c/d/i.    ENT - PEERL, EOMI, mucous membranes    moist , no discharge    (+)nystagmus - chronic.   NECK: Neck supple, non-tender without lymphadenopathy, no masses, no JVD.  R side of neck SCM mild spasm poss LAD, no redness or purulence or c-spine ttp.   PULM - CTA b/l,  symmetric breath sounds  COR -  normal heart sounds    ABD - , ND, NT, soft,  BACK - no CVA tenderness, nontender spine     EXTREMS - no edema, no deformity, warm and well perfused    SKIN - no rash    or bruising      NEUROLOGIC - alert, face symmetric, speech fluent, sensation nl, motor no focal deficit.

## 2022-10-20 NOTE — ED PROVIDER NOTE - PHYSICAL EXAMINATION
VITALS:   T(C): 36.9 (10-19-22 @ 22:53), Max: 37 (10-19-22 @ 21:53)  HR: 80 (10-20-22 @ 00:20) (80 - 97)  BP: 146/103 (10-20-22 @ 00:20) (136/86 - 146/103)  RR: 16 (10-20-22 @ 00:20) (16 - 18)  SpO2: 100% (10-20-22 @ 00:20) (100% - 100%)    GENERAL: NAD, lying in bed comfortably  HEAD:  Atraumatic, Normocephalic  EYES: EOMI, conjunctiva and sclera clear, nystagmus  ENT: Moist mucous membranes  NECK: Supple,  shunt palpable on R side of neck, firm mass palpated anteriorly to  shunt  CHEST/LUNG: Clear to auscultation bilaterally; No rales, rhonchi, wheezing, or rubs. Unlabored respirations  HEART: Regular rate and rhythm; No murmurs, rubs, or gallops  ABDOMEN: BSx4; Soft, nontender, nondistended  EXTREMITIES:  brisk capillary refill. No clubbing, cyanosis, or edema  NERVOUS SYSTEM:  A&Ox3, no focal deficits   SKIN: No rashes or lesions

## 2022-10-20 NOTE — ED PROVIDER NOTE - OBJECTIVE STATEMENT
32y F with PMH hydrocephalus with R  shunt, presenting with worsening HA for 1w. Pt reports HA is constant and diffuse. Associated with nausea and decreased PO intake. No vomiting. Reports worsening of known R-sided neuropathy in the arm, as well as R neck pain/pressure. Also reports fever last 2d, including today 100.8. Has sick contact daughter and mom who had URI symptoms earlier in the week. Pt has also had a cough. Denies any visual changes, hearing changes, motor weakness, CP, SOB.     Pt is followed by neurosurg here. Most recent  shunt revision was by Dr. Zuniga in 2017.

## 2022-10-20 NOTE — CONSULT NOTE ADULT - SUBJECTIVE AND OBJECTIVE BOX
[facility] DEPARTMENT OF OPHTHALMOLOGY - INITIAL ADULT CONSULT  -----------------------------------------------------------------------------------------------------------------  Anish White MD PGY 3  [phone]  -----------------------------------------------------------------------------------------------------------------    HPI: 32F with history of hydrocephalus with  shunt and multiple revisions presents to the ED for 1 week of headache associated with nausea. Ophtho consulted for papilledema evaluation. Patient denies any changes in vision. Denies blurry vision, flashes, floaters, FBS, erythema, discharge, double vision, OU. Denies pulsatile tinnitus, transient visual obscuration, new medications, weight gain/loss.      PAST MEDICAL & SURGICAL HISTORY:  Personal History of Hydrocephalus (ICD9 V12.49)      Strabismus      Obstructive hydrocephalus   shunt placed at birth with multiple revisions for malfunction. LAST REVISION 02/2015      Craniosynostosis      Vertigo      Neuropathy  R side of the body      Ventriculo-Peritoneal Shunt Status (ICD9 V45.2)   shunt placed at birth with multiple revisions 1990 2002 x2 2003 2004 2007 2009 2011 2015 2016,2017      S/P craniotomy  cranial vault expansion      H/O eye surgery  Repair of lazy eyes 1995        Past Ocular History: dry eyes  Ophthalmic Medications: TheraTears  FAMILY HISTORY:    Social History: denies etoh/tobacco    MEDICATIONS  (STANDING):  acetaminophen   IVPB .. 1000 milliGRAM(s) IV Intermittent once    MEDICATIONS  (PRN):    Allergies & Intolerances:     Review of Systems:  Constitutional: No fever, chills  Eyes: No blurry vision, flashes, floaters, FBS, erythema, discharge, double vision, OU  Neuro: No tremors  Cardiovascular: No chest pain, palpitations  Respiratory: No SOB, no cough  GI: No nausea, vomiting, abdominal pain  : No dysuria  Skin: no rash  Psych: no depression  Endocrine: no polyuria, polydipsia  Heme/lymph: no swelling    VITALS: T(C): 36.8 (10-20-22 @ 07:09)  T(F): 98.2 (10-20-22 @ 07:09), Max: 98.6 (10-19-22 @ 21:53)  HR: 87 (10-20-22 @ 07:09) (80 - 97)  BP: 124/73 (10-20-22 @ 07:09) (124/73 - 146/103)  RR:  (15 - 18)  SpO2:  (100% - 100%)  Wt(kg): --  General: AAO x 3, appropriate mood and affect    Ophthalmology Exam:  Visual acuity (sc): 20/25 OD, 20/30 OS  Pupils: PERRL OU, no APD  Ttono: 16 OD, 16 OS  Extraocular movements (EOMs): Full OU, no pain, no diplopia  Confrontational Visual Field (CVF): Full OD, full OS  Color Plates: 12/12 OD, 12/12 OS    Pen Light Exam (PLE)  External: Flat OU  Lids/Lashes/Lacrimal Ducts: Flat OU    Sclera/Conjunctiva: W+Q OU  Cornea: Cl OU  Anterior Chamber: D+F OU    Iris: Flat OU  Lens: Cl OU    Fundus Exam: dilated with 1% tropicamide and 2.5% phenylephrine  Approval obtained from primary team for dilation  Patient aware that pupils can remained dilated for at least 4-6 hours  Exam performed with 20D lens    Vitreous: wnl OU  Disc, cup/disc: sharp and pink, 0.4 OU  Macula: wnl OU  Vessels: wnl OU  Periphery: wnl OU    Labs/Imaging:      IMPRESSION:    Stable position of  shunt catheters with unchanged appearance of   dysmorphic ventricles. No hydrocephalus. No acute intracranial hemorrhage   or mass effect.      --- End of Report ---      ANJEL BARKER MD; Resident Radiologist  This document has been electronically signed.  BRANDY WONG MD; Attending Radiologist  This document has been electronically signed. Oct 20 2022  4:36AM     [facility] DEPARTMENT OF OPHTHALMOLOGY - INITIAL ADULT CONSULT  -----------------------------------------------------------------------------------------------------------------  Anish White MD PGY 3  [phone]  -----------------------------------------------------------------------------------------------------------------    HPI: 32F with history of hydrocephalus with  shunt and multiple revisions presents to the ED for 1 week of headache associated with nausea. Ophtho consulted for papilledema evaluation. Patient denies any changes in vision. Denies blurry vision, flashes, floaters, FBS, erythema, discharge, double vision, OU. Denies pulsatile tinnitus, transient visual obscuration, new medications, weight gain/loss.      PAST MEDICAL & SURGICAL HISTORY:  Personal History of Hydrocephalus (ICD9 V12.49)      Strabismus      Obstructive hydrocephalus   shunt placed at birth with multiple revisions for malfunction. LAST REVISION 02/2015      Craniosynostosis      Vertigo      Neuropathy  R side of the body      Ventriculo-Peritoneal Shunt Status (ICD9 V45.2)   shunt placed at birth with multiple revisions 1990 2002 x2 2003 2004 2007 2009 2011 2015 2016,2017      S/P craniotomy  cranial vault expansion      H/O eye surgery  Repair of lazy eyes 1995        Past Ocular History: dry eyes  Ophthalmic Medications: TheraTears  FAMILY HISTORY:    Social History: denies etoh/tobacco    MEDICATIONS  (STANDING):  acetaminophen   IVPB .. 1000 milliGRAM(s) IV Intermittent once    MEDICATIONS  (PRN):    Allergies & Intolerances:     Review of Systems:  Constitutional: No fever, chills  Eyes: No blurry vision, flashes, floaters, FBS, erythema, discharge, double vision, OU  Neuro: No tremors  Cardiovascular: No chest pain, palpitations  Respiratory: No SOB, no cough  GI: No nausea, vomiting, abdominal pain  : No dysuria  Skin: no rash  Psych: no depression  Endocrine: no polyuria, polydipsia  Heme/lymph: no swelling    VITALS: T(C): 36.8 (10-20-22 @ 07:09)  T(F): 98.2 (10-20-22 @ 07:09), Max: 98.6 (10-19-22 @ 21:53)  HR: 87 (10-20-22 @ 07:09) (80 - 97)  BP: 124/73 (10-20-22 @ 07:09) (124/73 - 146/103)  RR:  (15 - 18)  SpO2:  (100% - 100%)  Wt(kg): --  General: AAO x 3, appropriate mood and affect    Ophthalmology Exam:  Visual acuity (sc): 20/25 OD, 20/30 OS  Pupils: PERRL OU, no APD  Ttono: 16 OD, 16 OS  Extraocular movements (EOMs): Full OU, no pain, no diplopia  Confrontational Visual Field (CVF): Full OD, full OS  Color Plates: 12/12 OD, 12/12 OS    Pen Light Exam (PLE)  External: Flat OU  Lids/Lashes/Lacrimal Ducts: Flat OU    Sclera/Conjunctiva: W+Q OU  Cornea: Cl OU  Anterior Chamber: D+F OU    Iris: Flat OU  Lens: Cl OU    Fundus Exam: dilated with 1% tropicamide and 2.5% phenylephrine  Approval obtained from primary team for dilation  Patient aware that pupils can remained dilated for at least 4-6 hours  Exam performed with 20D lens    Vitreous: wnl OU  Disc, cup/disc: sharp and pink, 0.4 OU; no disc edema OU  Macula: wnl OU  Vessels: wnl OU  Periphery: wnl OU    Labs/Imaging:      IMPRESSION:    Stable position of  shunt catheters with unchanged appearance of   dysmorphic ventricles. No hydrocephalus. No acute intracranial hemorrhage   or mass effect.      --- End of Report ---      ANJEL BARKER MD; Resident Radiologist  This document has been electronically signed.  BRANDY ZOHRABIAN MD; Attending Radiologist  This document has been electronically signed. Oct 20 2022  4:36AM

## 2022-10-20 NOTE — ED PROVIDER NOTE - PROGRESS NOTE DETAILS
Malathi Cornelius: Seen by neurosurgery. CT shows no  obstruction. rec to f/u with ophtho Shayy Keating PGY3: Pt signed out to me pending ophtho evaluation. Pt seen by ophtho, there was no concern for papilledema. Pt okay to f/u outpt per them. Pt was re-evaluated at bedside, VSS, feeling better overall. Results were discussed with patient as well as return precautions and follow up plan with PCP and/or neurosurgery and ophtho. Time was taken to answer any questions that the patient had before providing them with discharge paperwork.

## 2022-10-20 NOTE — ED ADULT NURSE NOTE - OBJECTIVE STATEMENT
32 yof presents A&Ox4, c/o headache, decreased appetite and L shoulder pain that has been ongoing x1 month but worsened today. States temp max 100.8 at home. Pt has a  shunt jeff. PMHx hydrocephalus. Respirations even and unlabored, sating 100% on RA. Pt denies any chest pain, dyspnea, dizziness, visual disturbances, N/V/D. No neuro deficits noted. +pulse motor sensation and strength in all 4 exts. Pt well appearing, no acute distress. Vital signs stable. bed in lowest position, side rails up, call bell in hand, safety maintained.

## 2022-10-20 NOTE — ED ADULT NURSE NOTE - NSIMPLEMENTINTERV_GEN_ALL_ED
Implemented All Universal Safety Interventions:  [place] to call system. Call bell, personal items and telephone within reach. Instruct patient to call for assistance. Room bathroom lighting operational. Non-slip footwear when patient is off stretcher. Physically safe environment: no spills, clutter or unnecessary equipment. Stretcher in lowest position, wheels locked, appropriate side rails in place.

## 2022-10-20 NOTE — ED PROVIDER NOTE - NSFOLLOWUPINSTRUCTIONS_ED_ALL_ED_FT
Headache    A headache is pain or discomfort felt around the head or neck area. The specific cause of a headache may not be found as there are many types including tension headaches, migraine headaches, and cluster headaches. Watch your condition for any changes. Things you can do to manage your pain include taking over the counter and prescription medications as instructed by your health care provider, lying down in a dark quiet room, limiting stress, getting regular sleep, and refraining from alcohol and tobacco products.    Please follow up with your private neurosurgeon and ophthalmology within 2-3 days.    SEEK IMMEDIATE MEDICAL CARE IF YOU HAVE ANY OF THE FOLLOWING SYMPTOMS: fever, vomiting, stiff neck, loss of vision, problems with speech, muscle weakness, loss of balance, trouble walking, passing out, or confusion.

## 2022-10-20 NOTE — CONSULT NOTE ADULT - SUBJECTIVE AND OBJECTIVE BOX
32y F with PMH hydrocephalus with  shunt last revised 2017, presenting with worsening HA for 1w. Pt reports HA is constant and diffuse. Associated with nausea and decreased PO intake. No vomiting. Reports worsening of known R-sided neuropathy in the arm, as well as R neck pain/pressure. Also reports fever last 2d, including today 100.8. Has sick contact daughter and mom who had URI symptoms earlier in the week. Pt has also had a cough. Denies any visual changes, hearing changes, motor weakness, CP, SOB.     PAST MEDICAL & SURGICAL HISTORY:  Personal History of Hydrocephalus (ICD9 V12.49)  Strabismus  Obstructive hydrocephalus   shunt placed at birth with multiple revisions for malfunction. LAST REVISION 02/2017  Craniosynostosis  Vertigo  Neuropathy  R side of the body  Ventriculo-Peritoneal Shunt Status (ICD9 V45.2)   shunt placed at birth with multiple revisions 1990 2002 x2 2003 2004 2007 2009 2011 2015 2016,2017  S/P craniotomy  cranial vault expansion  H/O eye surgery  Repair of lazy eyes 1995    WDWN female in NAD  Vital Signs Last 24 Hrs  T(C): 36.9 (19 Oct 2022 22:53), Max: 37 (19 Oct 2022 21:53)  T(F): 98.4 (19 Oct 2022 22:53), Max: 98.6 (19 Oct 2022 21:53)  HR: 80 (20 Oct 2022 00:20) (80 - 97)  BP: 146/103 (20 Oct 2022 00:20) (136/86 - 146/103)  BP(mean): --  RR: 16 (20 Oct 2022 00:20) (16 - 18)  SpO2: 100% (20 Oct 2022 00:20) (100% - 100%)    Parameters below as of 20 Oct 2022 00:20  Patient On (Oxygen Delivery Method): room air    HEENT: Shunt reservoir palpable, nontender, pumps and fills  Neck: Small right sided mildly tender nodule, possible lymph node  Neurologic: AAO X 3  PERRLA, EOMI  CN 2-12 grossly intact  MCKENNA strength 5/5  No dysmetria or drift  SILT    Noncontrast head CT: Stable ventricular size and configuration compared with MRI performed 2021  Shunt series: No kinking or discontinuity

## 2022-10-20 NOTE — ED PROVIDER NOTE - CLINICAL SUMMARY MEDICAL DECISION MAKING FREE TEXT BOX
32y F with PMH hydrocephalus with R  shunt, presenting with worsening HA for 1w, associated with nausea and decreased PO intake, and worsening R arm neuropathy. Pt has had fever 100.8. Pt has sick contacts with URI. Possible  shunt obstruction. Work up for infection Plan: cbc. cmp, hcg, ua/uc, cxr, rvp shuntogram, CTH stereotactic

## 2022-10-21 LAB
CULTURE RESULTS: SIGNIFICANT CHANGE UP
SPECIMEN SOURCE: SIGNIFICANT CHANGE UP

## 2022-10-25 LAB
CULTURE RESULTS: SIGNIFICANT CHANGE UP
CULTURE RESULTS: SIGNIFICANT CHANGE UP
SPECIMEN SOURCE: SIGNIFICANT CHANGE UP
SPECIMEN SOURCE: SIGNIFICANT CHANGE UP

## 2022-10-27 NOTE — H&P PST ADULT - NS PRO ABUSE SCREEN SUSPICION NEGLECT YN
Purse String (Intermediate) Text: Given the location of the defect and the characteristics of the surrounding skin a purse string intermediate closure was deemed most appropriate.  Undermining was performed circumfirentially around the surgical defect.  A purse string suture was then placed and tightened. no

## 2022-10-31 NOTE — PATIENT PROFILE ADULT. - EXTENSIONS OF SELF_ADULT
[facility] [place] [address] [facility] [address] [place] [address]  Phone:  [phone] - Fax:  [phone]   [facility] Progress Report and Disability Certification  Date of Service: 10/31/2022   No Show:  No  Date / Time of Next Visit: 11/7/2022 @ 9:00 AM    Claim Information   Patient Name: Esperanza De Leon  Claim Number:     Employer:   "[employer] Date of Injury: 10/31/2022     Insurer / TPA: [employer]  ID / SSN:     Occupation: LEAD  Diagnosis: Diagnoses of Closed nondisplaced fracture of middle phalanx of left ring finger, initial encounter and Crushing injury of hand and fingers, left, initial encounter were pertinent to this visit.    Medical Information   Related to Industrial Injury? Yes    Subjective Complaints:  DOI: 10/31/22. Patient is 46 year old female presenting with crush injury to left index, middle and ring finger after having them slammed in large, heavy metal door. She reports pain with some distal paresthesia. She has pain with ROM of hand. Denies any other injury. She has not done any treatment for this issue. No second job and no prior injury history with this hand.   Objective Findings: Physical Exam  Constitutional:       Appearance: Normal appearance. She is not ill-appearing.   Cardiovascular:      Rate and Rhythm: Normal rate and regular rhythm.      Heart sounds: No murmur heard.  Pulmonary:      Effort: Pulmonary effort is normal.      Breath sounds: Normal breath sounds.   Musculoskeletal:      Left hand: Swelling, tenderness and bony tenderness present. Decreased range of motion. Decreased strength.        Arms:        Comments: To left index, middle and ring finger.   Skin:     General: Skin is warm and dry.   Neurological:      General: No focal deficit present.      Mental Status: She is alert.      Pre-Existing Condition(s):     Assessment:   Initial Visit    Status: Additional Care Required  Permanent Disability:No     Plan: Medication    Diagnostics: X-ray    Comments:  Fracture middle phalanx of left ring finger. Placed in splint.    Disability Information   Status: Released to Restricted Duty    From:  10/31/2022  Through: 2022 Restrictions are: Temporary   Physical Restrictions   Sitting:    Standing:    Stooping:    Bending:      Squatting:    Walking:    Climbing:    Pushing:      Pulling:    Other:    Reaching Above Shoulder (L):   Reaching Above Shoulder (R):       Reaching Below Shoulder (L):    Reaching Below Shoulder (R):      Not to exceed Weight Limits   Carrying(hrs):   Weight Limit(lb):   Lifting(hrs):   Weight  Limit(lb):     Comments:      Repetitive Actions   Hands: i.e. Fine Manipulations from [place] hrs/day  Comments:left hand   Feet: i.e. Operating Foot Controls:     Driving / Operate Machinery:     Health Care Provider’s Original or Electronic Signature  GABRIEL DuncanPJONAS Health Care Provider’s Original or Electronic Signature    Elian Monge MD         Clinic Name / Location: [address] [address] Clinic Phone Number: Dept: [phone]   Appointment Time: 1:15 Pm Visit Start Time: 2:25 PM   Check-In Time:  1:17 Pm Visit Discharge Time:  3:21 PM    Original-Treating Physician or Chiropractor    Page 2-Insurer/TPA    Page 3-Employer    Page 4-Employee     None

## 2022-12-19 NOTE — OB PROVIDER TRIAGE NOTE - NS_EFFACEMANT_OBGYN_ALL_OB_NU
ID cross coverage    Consult received for -liver abscess. Chart and labs reviewed at length. D/w hospitalist PA at length. Tissue culture 12/9+ for scant anaerobic GNR  Patient for US guided drainage today  Will await cultures. Patient was on cefepime/Flagyl IV  Changed to meropenem IV due to development of rash on 12/17  Patient doing well on meropenem, afebrile, improving WBC count  Continue same, await cultures, ID service to follow. Patient will require total of 4 to 6 weeks of antimicrobial therapy. 80

## 2023-05-29 ENCOUNTER — APPOINTMENT (OUTPATIENT)
Dept: AFTER HOURS CARE | Facility: EMERGENCY ROOM | Age: 33
End: 2023-05-29
Payer: MEDICAID

## 2023-05-30 ENCOUNTER — EMERGENCY (EMERGENCY)
Facility: HOSPITAL | Age: 33
LOS: 1 days | Discharge: HOME | End: 2023-05-30
Attending: EMERGENCY MEDICINE | Admitting: EMERGENCY MEDICINE
Payer: MEDICAID

## 2023-05-30 VITALS
TEMPERATURE: 98 F | SYSTOLIC BLOOD PRESSURE: 117 MMHG | DIASTOLIC BLOOD PRESSURE: 77 MMHG | HEART RATE: 111 BPM | OXYGEN SATURATION: 100 % | RESPIRATION RATE: 18 BRPM

## 2023-05-30 VITALS
DIASTOLIC BLOOD PRESSURE: 75 MMHG | OXYGEN SATURATION: 100 % | HEART RATE: 91 BPM | RESPIRATION RATE: 18 BRPM | SYSTOLIC BLOOD PRESSURE: 138 MMHG | TEMPERATURE: 98 F

## 2023-05-30 DIAGNOSIS — Z98.890 OTHER SPECIFIED POSTPROCEDURAL STATES: Chronic | ICD-10-CM

## 2023-05-30 LAB
ALBUMIN SERPL ELPH-MCNC: 4.3 G/DL — SIGNIFICANT CHANGE UP (ref 3.3–5)
ALP SERPL-CCNC: 115 U/L — SIGNIFICANT CHANGE UP (ref 40–120)
ALT FLD-CCNC: 26 U/L — SIGNIFICANT CHANGE UP (ref 4–33)
ANION GAP SERPL CALC-SCNC: 15 MMOL/L — HIGH (ref 7–14)
APPEARANCE UR: ABNORMAL
AST SERPL-CCNC: 18 U/L — SIGNIFICANT CHANGE UP (ref 4–32)
BASOPHILS # BLD AUTO: 0.01 K/UL — SIGNIFICANT CHANGE UP (ref 0–0.2)
BASOPHILS NFR BLD AUTO: 0.1 % — SIGNIFICANT CHANGE UP (ref 0–2)
BILIRUB SERPL-MCNC: 0.5 MG/DL — SIGNIFICANT CHANGE UP (ref 0.2–1.2)
BILIRUB UR-MCNC: NEGATIVE — SIGNIFICANT CHANGE UP
BUN SERPL-MCNC: 17 MG/DL — SIGNIFICANT CHANGE UP (ref 7–23)
CALCIUM SERPL-MCNC: 9 MG/DL — SIGNIFICANT CHANGE UP (ref 8.4–10.5)
CHLORIDE SERPL-SCNC: 109 MMOL/L — HIGH (ref 98–107)
CO2 SERPL-SCNC: 16 MMOL/L — LOW (ref 22–31)
COLOR SPEC: YELLOW — SIGNIFICANT CHANGE UP
CREAT SERPL-MCNC: 0.88 MG/DL — SIGNIFICANT CHANGE UP (ref 0.5–1.3)
DIFF PNL FLD: NEGATIVE — SIGNIFICANT CHANGE UP
EGFR: 89 ML/MIN/1.73M2 — SIGNIFICANT CHANGE UP
EOSINOPHIL # BLD AUTO: 0 K/UL — SIGNIFICANT CHANGE UP (ref 0–0.5)
EOSINOPHIL NFR BLD AUTO: 0 % — SIGNIFICANT CHANGE UP (ref 0–6)
GLUCOSE SERPL-MCNC: 110 MG/DL — HIGH (ref 70–99)
GLUCOSE UR QL: NEGATIVE — SIGNIFICANT CHANGE UP
HCG SERPL-ACNC: <1 MIU/ML — SIGNIFICANT CHANGE UP
HCT VFR BLD CALC: 41.9 % — SIGNIFICANT CHANGE UP (ref 34.5–45)
HGB BLD-MCNC: 12.4 G/DL — SIGNIFICANT CHANGE UP (ref 11.5–15.5)
IANC: 10.79 K/UL — HIGH (ref 1.8–7.4)
IMM GRANULOCYTES NFR BLD AUTO: 0.4 % — SIGNIFICANT CHANGE UP (ref 0–0.9)
KETONES UR-MCNC: NEGATIVE — SIGNIFICANT CHANGE UP
LEUKOCYTE ESTERASE UR-ACNC: ABNORMAL
LIDOCAIN IGE QN: 24 U/L — SIGNIFICANT CHANGE UP (ref 7–60)
LYMPHOCYTES # BLD AUTO: 0.71 K/UL — LOW (ref 1–3.3)
LYMPHOCYTES # BLD AUTO: 6 % — LOW (ref 13–44)
MCHC RBC-ENTMCNC: 23.1 PG — LOW (ref 27–34)
MCHC RBC-ENTMCNC: 29.6 GM/DL — LOW (ref 32–36)
MCV RBC AUTO: 78.2 FL — LOW (ref 80–100)
MONOCYTES # BLD AUTO: 0.37 K/UL — SIGNIFICANT CHANGE UP (ref 0–0.9)
MONOCYTES NFR BLD AUTO: 3.1 % — SIGNIFICANT CHANGE UP (ref 2–14)
NEUTROPHILS # BLD AUTO: 10.79 K/UL — HIGH (ref 1.8–7.4)
NEUTROPHILS NFR BLD AUTO: 90.4 % — HIGH (ref 43–77)
NITRITE UR-MCNC: NEGATIVE — SIGNIFICANT CHANGE UP
NRBC # BLD: 0 /100 WBCS — SIGNIFICANT CHANGE UP (ref 0–0)
NRBC # FLD: 0 K/UL — SIGNIFICANT CHANGE UP (ref 0–0)
PH UR: 6 — SIGNIFICANT CHANGE UP (ref 5–8)
PLATELET # BLD AUTO: 323 K/UL — SIGNIFICANT CHANGE UP (ref 150–400)
POTASSIUM SERPL-MCNC: 3.9 MMOL/L — SIGNIFICANT CHANGE UP (ref 3.5–5.3)
POTASSIUM SERPL-SCNC: 3.9 MMOL/L — SIGNIFICANT CHANGE UP (ref 3.5–5.3)
PROT SERPL-MCNC: 7.9 G/DL — SIGNIFICANT CHANGE UP (ref 6–8.3)
PROT UR-MCNC: ABNORMAL
RBC # BLD: 5.36 M/UL — HIGH (ref 3.8–5.2)
RBC # FLD: 14.6 % — HIGH (ref 10.3–14.5)
SODIUM SERPL-SCNC: 140 MMOL/L — SIGNIFICANT CHANGE UP (ref 135–145)
SP GR SPEC: 1.04 — SIGNIFICANT CHANGE UP (ref 1.01–1.05)
UROBILINOGEN FLD QL: SIGNIFICANT CHANGE UP
WBC # BLD: 11.93 K/UL — HIGH (ref 3.8–10.5)
WBC # FLD AUTO: 11.93 K/UL — HIGH (ref 3.8–10.5)

## 2023-05-30 PROCEDURE — 74177 CT ABD & PELVIS W/CONTRAST: CPT | Mod: 26,MA

## 2023-05-30 PROCEDURE — 99284 EMERGENCY DEPT VISIT MOD MDM: CPT

## 2023-05-30 PROCEDURE — 70450 CT HEAD/BRAIN W/O DYE: CPT | Mod: 26,MA

## 2023-05-30 PROCEDURE — 99205 OFFICE O/P NEW HI 60 MIN: CPT | Mod: 95

## 2023-05-30 RX ORDER — ONDANSETRON 8 MG/1
4 TABLET, FILM COATED ORAL ONCE
Refills: 0 | Status: COMPLETED | OUTPATIENT
Start: 2023-05-30 | End: 2023-05-30

## 2023-05-30 RX ORDER — SODIUM CHLORIDE 9 MG/ML
1000 INJECTION INTRAMUSCULAR; INTRAVENOUS; SUBCUTANEOUS ONCE
Refills: 0 | Status: COMPLETED | OUTPATIENT
Start: 2023-05-30 | End: 2023-05-30

## 2023-05-30 RX ORDER — ONDANSETRON 8 MG/1
1 TABLET, FILM COATED ORAL
Qty: 2 | Refills: 0
Start: 2023-05-30 | End: 2023-06-03

## 2023-05-30 RX ORDER — FAMOTIDINE 10 MG/ML
20 INJECTION INTRAVENOUS ONCE
Refills: 0 | Status: COMPLETED | OUTPATIENT
Start: 2023-05-30 | End: 2023-05-30

## 2023-05-30 RX ADMIN — FAMOTIDINE 20 MILLIGRAM(S): 10 INJECTION INTRAVENOUS at 09:13

## 2023-05-30 RX ADMIN — Medication 30 MILLILITER(S): at 09:13

## 2023-05-30 RX ADMIN — SODIUM CHLORIDE 1000 MILLILITER(S): 9 INJECTION INTRAMUSCULAR; INTRAVENOUS; SUBCUTANEOUS at 09:12

## 2023-05-30 RX ADMIN — SODIUM CHLORIDE 1000 MILLILITER(S): 9 INJECTION INTRAMUSCULAR; INTRAVENOUS; SUBCUTANEOUS at 10:23

## 2023-05-30 RX ADMIN — ONDANSETRON 4 MILLIGRAM(S): 8 TABLET, FILM COATED ORAL at 09:13

## 2023-05-30 NOTE — ED ADULT NURSE REASSESSMENT NOTE - NS ED NURSE REASSESS COMMENT FT1
Pt a&ox4, denies cp, sob, n/v, headache, dizziness, fever/chills. Breathing even, unlabored. Plan to dc home.

## 2023-05-30 NOTE — HISTORY OF PRESENT ILLNESS
[Home] : at home, [unfilled] , at the time of the visit. [Other Location: e.g. Home (Enter Location, City,State)___] : at [unfilled] [Verbal consent obtained from patient] : the patient, [unfilled] [FreeTextEntry8] : 33 yo female for evaluation of fever, myalgia, diarrhea, nausea, vomiting since around 9PM. Reports also with generalized abd pain, intermittent. Reports yesterday morning daughter with multiple episodes of vomiting, was diagnosed with virus at urgent care yesterday, improved with Zofran. Reports abd pain is crampy, mainly around umbilicus, unchanged with vomiting and diarrhea. Denies chest pain or shortness of breath. Has not taken any medications for pain.

## 2023-05-30 NOTE — ED PROVIDER NOTE - PROGRESS NOTE DETAILS
Tanja Grayson PGY1: CTs negative for acute intraabdominal or intracranial pathology. Pt endorses improvement in symptoms and pain. Pt able to tolerate PO intake. Pt okay for dc at this time. Tanja Grayson PGY1: CTs negative for acute intraabdominal or intracranial pathology. Pt endorses improvement in symptoms and pain. Pt able to tolerate PO intake. Pt okay for dc at this time. All results d/w her and copies given. Instructed to f/u with  her doctor and return precautions discussed.

## 2023-05-30 NOTE — ED PROVIDER NOTE - OBJECTIVE STATEMENT
32-year-old female with past medical history of hydrocephalus s/p  shunt, neuropathy presents to the ED for 2 days of nausea and vomiting and diarrhea.  Patient states her daughter was recently sick and diagnosed with norovirus.  Patient endorses multiple episodes of projectile vomiting along with watery diarrhea.  Patient denies blood in vomit or stool.  Patient endorsing headache but states it feels similar to her normal headaches.  Patient also endorses diffuse severe abdominal pain since the onset of her symptoms.  Patient has been unable to tolerate p.o. intake.  Patient denies fevers, cough, sore throat, vision changes, chest pain, trouble breathing, dysuria, hematuria.

## 2023-05-30 NOTE — PLAN
[No new medications perscribed] : Treat in place: No new medications prescribed [FreeTextEntry1] : Discussed the possibility of surgical abdomen and risk of waiting to seek medical attention. Patient states she want to try taking Zofran to see if this resolves her symptoms, if unimproved will go to ER immediately, otherwise will go to in the AM. I discussed possibility of delayed diagnosis, patient expresses full understanding and states she will accept those risks

## 2023-05-30 NOTE — ED PROVIDER NOTE - CLINICAL SUMMARY MEDICAL DECISION MAKING FREE TEXT BOX
32-year-old female with past medical history of hydrocephalus s/p  shunt, neuropathy presents to the ED for 2 days of nausea and vomiting and diarrhea. Pt's daughter had Norovirus 2 days ago. + diffuse abdominal TTP. Denies vision changes or worsening headache. Lower concern for worsening hydrocephalus or shunt malfunction in setting of known close sick contact. Most likely viral gastroenteritis. Other differentials include appendicitis, colitis, kidney stones. Plan for labs, CTs, IVF and antiemetics. Dispo pending lab, imaging and ability to tolerate PO.

## 2023-05-30 NOTE — ED ADULT NURSE REASSESSMENT NOTE - NS ED NURSE REASSESS COMMENT FT1
Report received from Chaparro Montaño. Pt PO challenged and denies n/v. c/o mild abdominal discomfort. Pt states "my stomach hurts, but I don't feel like Im going to throw up." Pt denies cp, sob, headache, dizziness. Breathing even, unlabored. Pending dispo.

## 2023-05-30 NOTE — ED PROVIDER NOTE - PATIENT PORTAL LINK FT
You can access the FollowMyHealth Patient Portal offered by [facility] by registering at the following website: [email or web address]. By joining [employer]’s FollowMyHealth portal, you will also be able to view your health information using other applications (apps) compatible with our system.

## 2023-05-30 NOTE — ED PROVIDER NOTE - PHYSICAL EXAMINATION
Constitutional: VS reviewed. Alert and orientedx3, well appearing, no apparent distress  HEENT: Atraumatic, EOMI, dry mucous membranes  CV: Tachycardic   Lungs: Clear and equal bilaterally, no wheezes, rales or crackles  Abdomen: Soft, nondistended, diffusely TTP   MSK: No deformities. No CVA TTP.   Skin: Warm and dry. As visualized no rashes, lesions, bruising or erythema  Neuro: Strength 5/5 in all extremities. Decreased sensation on right side of face (baseline for pt). No pronator drift. No facial droop.   Lymph: No pitting edema in extremities.

## 2023-05-30 NOTE — ASSESSMENT
[FreeTextEntry1] : likely infectious etiology (with sick contact with similar symptoms improving) but concern for possible surgical etiology of abdominal pain

## 2023-05-30 NOTE — ED ADULT TRIAGE NOTE - CHIEF COMPLAINT QUOTE
c/o diarrhea, vomiting since last night. endorses generalized abd pain. hx hydrocephalus, neuropathy, depression.

## 2023-05-30 NOTE — ED PROVIDER NOTE - NSFOLLOWUPINSTRUCTIONS_ED_ALL_ED_FT
You were seen in the ED today for abdominal pain and nausea/vomiting.    Your work up included lab work and cat scans. Your results are included in your paperwork.    Your symptoms are likely due to a viral illness.     Please follow up with your PCP within the next 1 week.     A prescription for ZOFRAN was sent to your pharmacy. Please take the medication as prescribed, as needed for nausea and vomiting.    If you experience any of the following please return to the ED:  - Chest pain  - Trouble breathing  - Blood in stool or vomit  - Lightheadedness or dizziness  - Passing out

## 2023-05-30 NOTE — ED ADULT NURSE NOTE - OBJECTIVE STATEMENT
received pt in intake room 7, 32 yr/o female A+OX4, ambulatory at baseline. hx of brain surgery with  shunt placed. pt presented to the ED C/O Nausea, vomiting, and diarrhea since last night. no episodes of vomiting while in the ED. abdomen is falt, soft, nontender to palpation. states her daughter was recently diagnosed and treated for norovirus. VSS, denies chest pain and SOB, RR even and unlabored. pt is pending US IV placement. PERRLA noted, denies weakness in extremities, pt has chronic neuropathy in right arm; denies any worsening numbness and tingling in extremity. pts speech is clear and she is able to follow commands. pt is stable at this time.

## 2023-05-30 NOTE — ED ADULT NURSE NOTE - NSFALLHARMRISKINTERV_ED_ALL_ED

## 2023-05-31 LAB
CULTURE RESULTS: SIGNIFICANT CHANGE UP
SPECIMEN SOURCE: SIGNIFICANT CHANGE UP

## 2023-06-04 NOTE — ED POST DISCHARGE NOTE - RESULT SUMMARY
culture grew 3 or more types of organisms  which indicate collection contamination, consider recollection only if clinically indicated. No antibiotic listed in ED provider note or prescription writer at time of discharge. Patient contacted. Discussed with patient to follow up with MD for repeat UA/UCX. Discussed with patient need to return to ED if symptoms don't continue to improve or recur or develops any new or worsening symptoms that are of concern.

## 2023-09-13 ENCOUNTER — APPOINTMENT (OUTPATIENT)
Dept: MRI IMAGING | Facility: CLINIC | Age: 33
End: 2023-09-13

## 2023-09-20 ENCOUNTER — EMERGENCY (EMERGENCY)
Facility: HOSPITAL | Age: 33
LOS: 1 days | Discharge: HOME | End: 2023-09-20
Attending: EMERGENCY MEDICINE | Admitting: EMERGENCY MEDICINE
Payer: MEDICAID

## 2023-09-20 VITALS
HEART RATE: 92 BPM | RESPIRATION RATE: 20 BRPM | OXYGEN SATURATION: 100 % | DIASTOLIC BLOOD PRESSURE: 84 MMHG | TEMPERATURE: 99 F | SYSTOLIC BLOOD PRESSURE: 126 MMHG

## 2023-09-20 VITALS
SYSTOLIC BLOOD PRESSURE: 147 MMHG | HEART RATE: 108 BPM | OXYGEN SATURATION: 100 % | TEMPERATURE: 99 F | DIASTOLIC BLOOD PRESSURE: 106 MMHG | RESPIRATION RATE: 20 BRPM

## 2023-09-20 DIAGNOSIS — Z98.890 OTHER SPECIFIED POSTPROCEDURAL STATES: Chronic | ICD-10-CM

## 2023-09-20 DIAGNOSIS — Z98.2 PRESENCE OF CEREBROSPINAL FLUID DRAINAGE DEVICE: ICD-10-CM

## 2023-09-20 LAB
ALBUMIN SERPL ELPH-MCNC: 4.6 G/DL — SIGNIFICANT CHANGE UP (ref 3.3–5)
ALP SERPL-CCNC: 122 U/L — HIGH (ref 40–120)
ALT FLD-CCNC: 24 U/L — SIGNIFICANT CHANGE UP (ref 4–33)
ANION GAP SERPL CALC-SCNC: 13 MMOL/L — SIGNIFICANT CHANGE UP (ref 7–14)
APPEARANCE UR: ABNORMAL
APTT BLD: 23.6 SEC — LOW (ref 24.5–35.6)
AST SERPL-CCNC: 15 U/L — SIGNIFICANT CHANGE UP (ref 4–32)
B PERT DNA SPEC QL NAA+PROBE: SIGNIFICANT CHANGE UP
B PERT+PARAPERT DNA PNL SPEC NAA+PROBE: SIGNIFICANT CHANGE UP
BACTERIA # UR AUTO: ABNORMAL /HPF
BASE EXCESS BLDV CALC-SCNC: -4.7 MMOL/L — LOW (ref -2–3)
BASOPHILS # BLD AUTO: 0.01 K/UL — SIGNIFICANT CHANGE UP (ref 0–0.2)
BASOPHILS NFR BLD AUTO: 0.1 % — SIGNIFICANT CHANGE UP (ref 0–2)
BILIRUB SERPL-MCNC: 0.3 MG/DL — SIGNIFICANT CHANGE UP (ref 0.2–1.2)
BILIRUB UR-MCNC: NEGATIVE — SIGNIFICANT CHANGE UP
BLOOD GAS VENOUS COMPREHENSIVE RESULT: SIGNIFICANT CHANGE UP
BORDETELLA PARAPERTUSSIS (RAPRVP): SIGNIFICANT CHANGE UP
BUN SERPL-MCNC: 14 MG/DL — SIGNIFICANT CHANGE UP (ref 7–23)
C PNEUM DNA SPEC QL NAA+PROBE: SIGNIFICANT CHANGE UP
CALCIUM SERPL-MCNC: 9.1 MG/DL — SIGNIFICANT CHANGE UP (ref 8.4–10.5)
CAST: 0 /LPF — SIGNIFICANT CHANGE UP (ref 0–4)
CHLORIDE BLDV-SCNC: 107 MMOL/L — SIGNIFICANT CHANGE UP (ref 96–108)
CHLORIDE SERPL-SCNC: 105 MMOL/L — SIGNIFICANT CHANGE UP (ref 98–107)
CO2 BLDV-SCNC: 21.5 MMOL/L — LOW (ref 22–26)
CO2 SERPL-SCNC: 21 MMOL/L — LOW (ref 22–31)
COLOR SPEC: YELLOW — SIGNIFICANT CHANGE UP
CREAT SERPL-MCNC: 1.02 MG/DL — SIGNIFICANT CHANGE UP (ref 0.5–1.3)
DIFF PNL FLD: NEGATIVE — SIGNIFICANT CHANGE UP
EGFR: 74 ML/MIN/1.73M2 — SIGNIFICANT CHANGE UP
EOSINOPHIL # BLD AUTO: 0.08 K/UL — SIGNIFICANT CHANGE UP (ref 0–0.5)
EOSINOPHIL NFR BLD AUTO: 0.8 % — SIGNIFICANT CHANGE UP (ref 0–6)
FLUAV SUBTYP SPEC NAA+PROBE: SIGNIFICANT CHANGE UP
FLUBV RNA SPEC QL NAA+PROBE: SIGNIFICANT CHANGE UP
GAS PNL BLDV: 134 MMOL/L — LOW (ref 136–145)
GAS PNL BLDV: SIGNIFICANT CHANGE UP
GLUCOSE BLDV-MCNC: 86 MG/DL — SIGNIFICANT CHANGE UP (ref 70–99)
GLUCOSE SERPL-MCNC: 89 MG/DL — SIGNIFICANT CHANGE UP (ref 70–99)
GLUCOSE UR QL: NEGATIVE MG/DL — SIGNIFICANT CHANGE UP
HADV DNA SPEC QL NAA+PROBE: SIGNIFICANT CHANGE UP
HCG SERPL-ACNC: <1 MIU/ML — SIGNIFICANT CHANGE UP
HCO3 BLDV-SCNC: 20 MMOL/L — LOW (ref 22–29)
HCOV 229E RNA SPEC QL NAA+PROBE: SIGNIFICANT CHANGE UP
HCOV HKU1 RNA SPEC QL NAA+PROBE: SIGNIFICANT CHANGE UP
HCOV NL63 RNA SPEC QL NAA+PROBE: SIGNIFICANT CHANGE UP
HCOV OC43 RNA SPEC QL NAA+PROBE: SIGNIFICANT CHANGE UP
HCT VFR BLD CALC: 41.7 % — SIGNIFICANT CHANGE UP (ref 34.5–45)
HCT VFR BLDA CALC: 35 % — SIGNIFICANT CHANGE UP (ref 34.5–46.5)
HGB BLD CALC-MCNC: 11.8 G/DL — SIGNIFICANT CHANGE UP (ref 11.7–16.1)
HGB BLD-MCNC: 13.1 G/DL — SIGNIFICANT CHANGE UP (ref 11.5–15.5)
HMPV RNA SPEC QL NAA+PROBE: SIGNIFICANT CHANGE UP
HPIV1 RNA SPEC QL NAA+PROBE: SIGNIFICANT CHANGE UP
HPIV2 RNA SPEC QL NAA+PROBE: SIGNIFICANT CHANGE UP
HPIV3 RNA SPEC QL NAA+PROBE: SIGNIFICANT CHANGE UP
HPIV4 RNA SPEC QL NAA+PROBE: SIGNIFICANT CHANGE UP
IANC: 5.26 K/UL — SIGNIFICANT CHANGE UP (ref 1.8–7.4)
IMM GRANULOCYTES NFR BLD AUTO: 0.2 % — SIGNIFICANT CHANGE UP (ref 0–0.9)
INR BLD: 0.98 RATIO — SIGNIFICANT CHANGE UP (ref 0.85–1.18)
KETONES UR-MCNC: 15 MG/DL
LACTATE BLDV-MCNC: 1.1 MMOL/L — SIGNIFICANT CHANGE UP (ref 0.5–2)
LEUKOCYTE ESTERASE UR-ACNC: ABNORMAL
LIDOCAIN IGE QN: 36 U/L — SIGNIFICANT CHANGE UP (ref 7–60)
LYMPHOCYTES # BLD AUTO: 3.55 K/UL — HIGH (ref 1–3.3)
LYMPHOCYTES # BLD AUTO: 36.9 % — SIGNIFICANT CHANGE UP (ref 13–44)
M PNEUMO DNA SPEC QL NAA+PROBE: SIGNIFICANT CHANGE UP
MCHC RBC-ENTMCNC: 22.9 PG — LOW (ref 27–34)
MCHC RBC-ENTMCNC: 31.4 GM/DL — LOW (ref 32–36)
MCV RBC AUTO: 72.9 FL — LOW (ref 80–100)
MONOCYTES # BLD AUTO: 0.69 K/UL — SIGNIFICANT CHANGE UP (ref 0–0.9)
MONOCYTES NFR BLD AUTO: 7.2 % — SIGNIFICANT CHANGE UP (ref 2–14)
NEUTROPHILS # BLD AUTO: 5.26 K/UL — SIGNIFICANT CHANGE UP (ref 1.8–7.4)
NEUTROPHILS NFR BLD AUTO: 54.8 % — SIGNIFICANT CHANGE UP (ref 43–77)
NITRITE UR-MCNC: NEGATIVE — SIGNIFICANT CHANGE UP
NRBC # BLD: 0 /100 WBCS — SIGNIFICANT CHANGE UP (ref 0–0)
NRBC # FLD: 0 K/UL — SIGNIFICANT CHANGE UP (ref 0–0)
PCO2 BLDV: 37 MMHG — LOW (ref 39–52)
PH BLDV: 7.35 — SIGNIFICANT CHANGE UP (ref 7.32–7.43)
PH UR: 6 — SIGNIFICANT CHANGE UP (ref 5–8)
PLATELET # BLD AUTO: 309 K/UL — SIGNIFICANT CHANGE UP (ref 150–400)
PO2 BLDV: 40 MMHG — SIGNIFICANT CHANGE UP (ref 25–45)
POTASSIUM BLDV-SCNC: 3.2 MMOL/L — LOW (ref 3.5–5.1)
POTASSIUM SERPL-MCNC: 3.3 MMOL/L — LOW (ref 3.5–5.3)
POTASSIUM SERPL-SCNC: 3.3 MMOL/L — LOW (ref 3.5–5.3)
PROT SERPL-MCNC: 8.5 G/DL — HIGH (ref 6–8.3)
PROT UR-MCNC: NEGATIVE MG/DL — SIGNIFICANT CHANGE UP
PROTHROM AB SERPL-ACNC: 11 SEC — SIGNIFICANT CHANGE UP (ref 9.5–13)
RAPID RVP RESULT: DETECTED
RBC # BLD: 5.72 M/UL — HIGH (ref 3.8–5.2)
RBC # FLD: 14.3 % — SIGNIFICANT CHANGE UP (ref 10.3–14.5)
RBC CASTS # UR COMP ASSIST: 2 /HPF — SIGNIFICANT CHANGE UP (ref 0–4)
REVIEW: SIGNIFICANT CHANGE UP
RSV RNA SPEC QL NAA+PROBE: SIGNIFICANT CHANGE UP
RV+EV RNA SPEC QL NAA+PROBE: DETECTED
SAO2 % BLDV: 72.1 % — SIGNIFICANT CHANGE UP (ref 67–88)
SARS-COV-2 RNA SPEC QL NAA+PROBE: SIGNIFICANT CHANGE UP
SODIUM SERPL-SCNC: 139 MMOL/L — SIGNIFICANT CHANGE UP (ref 135–145)
SP GR SPEC: 1.02 — SIGNIFICANT CHANGE UP (ref 1–1.03)
SQUAMOUS # UR AUTO: 9 /HPF — HIGH (ref 0–5)
UROBILINOGEN FLD QL: 0.2 MG/DL — SIGNIFICANT CHANGE UP (ref 0.2–1)
WBC # BLD: 9.61 K/UL — SIGNIFICANT CHANGE UP (ref 3.8–10.5)
WBC # FLD AUTO: 9.61 K/UL — SIGNIFICANT CHANGE UP (ref 3.8–10.5)
WBC UR QL: 9 /HPF — HIGH (ref 0–5)

## 2023-09-20 PROCEDURE — 71045 X-RAY EXAM CHEST 1 VIEW: CPT | Mod: 26

## 2023-09-20 PROCEDURE — 70450 CT HEAD/BRAIN W/O DYE: CPT | Mod: 26

## 2023-09-20 PROCEDURE — 93010 ELECTROCARDIOGRAM REPORT: CPT

## 2023-09-20 PROCEDURE — 74018 RADEX ABDOMEN 1 VIEW: CPT | Mod: 26

## 2023-09-20 PROCEDURE — 99285 EMERGENCY DEPT VISIT HI MDM: CPT

## 2023-09-20 PROCEDURE — 74177 CT ABD & PELVIS W/CONTRAST: CPT | Mod: 26,MA

## 2023-09-20 RX ORDER — METOCLOPRAMIDE HCL 10 MG
10 TABLET ORAL ONCE
Refills: 0 | Status: COMPLETED | OUTPATIENT
Start: 2023-09-20 | End: 2023-09-20

## 2023-09-20 RX ORDER — ONDANSETRON 8 MG/1
4 TABLET, FILM COATED ORAL ONCE
Refills: 0 | Status: COMPLETED | OUTPATIENT
Start: 2023-09-20 | End: 2023-09-20

## 2023-09-20 RX ORDER — ACETAMINOPHEN 500 MG
1000 TABLET ORAL ONCE
Refills: 0 | Status: COMPLETED | OUTPATIENT
Start: 2023-09-20 | End: 2023-09-20

## 2023-09-20 RX ORDER — ONDANSETRON 8 MG/1
4 TABLET, FILM COATED ORAL ONCE
Refills: 0 | Status: DISCONTINUED | OUTPATIENT
Start: 2023-09-20 | End: 2023-09-20

## 2023-09-20 RX ORDER — DIPHENHYDRAMINE HCL 50 MG
50 CAPSULE ORAL ONCE
Refills: 0 | Status: COMPLETED | OUTPATIENT
Start: 2023-09-20 | End: 2023-09-20

## 2023-09-20 RX ADMIN — Medication 50 MILLIGRAM(S): at 21:48

## 2023-09-20 RX ADMIN — Medication 10 MILLIGRAM(S): at 21:21

## 2023-09-20 RX ADMIN — Medication 400 MILLIGRAM(S): at 21:21

## 2023-09-20 RX ADMIN — ONDANSETRON 4 MILLIGRAM(S): 8 TABLET, FILM COATED ORAL at 23:10

## 2023-09-20 NOTE — CONSULT NOTE ADULT - PROBLEM SELECTOR RECOMMENDATION 9
- stereo CTH   - CT abd  - shunt series xrays  - ophtho consult to r/o pap  - if all negative, can give migraine cocktail       [id number]    Case discussed with attending neurosurgeon Dr. Jerez

## 2023-09-20 NOTE — ED PROVIDER NOTE - PATIENT PORTAL LINK FT
You can access the FollowMyHealth Patient Portal offered by [facility] by registering at the following website: [email or web address]. By joining [facility]’s FollowMyHealth portal, you will also be able to view your health information using other applications (apps) compatible with our system.

## 2023-09-20 NOTE — CONSULT NOTE ADULT - SUBJECTIVE AND OBJECTIVE BOX
NEUROSURGERY CONSULT    HPI: 31yo female hx hydrocephalus, VPS strata, last revised in 2017 proximal (many revisions prior), Anterior cranial vault expansion 2015, ICP monitors, who presents with HA and pain along shunt track x 2 weeks, reported fever at home (100.3), and abdominal pain that started yesterday. Last BM today. Usually relieves HA with medical marijuana. Has headaches at baseline, but this HA is worse in severity than usual.       Vital Signs Last 24 Hrs  T(C): 37.2 (20 Sep 2023 18:34), Max: 37.2 (20 Sep 2023 18:34)  T(F): 98.9 (20 Sep 2023 18:34), Max: 98.9 (20 Sep 2023 18:34)  HR: 108 (20 Sep 2023 18:34) (108 - 108)  BP: 147/106 (20 Sep 2023 18:34) (147/106 - 147/106)  BP(mean): --  RR: 20 (20 Sep 2023 18:34) (20 - 20)  SpO2: 100% (20 Sep 2023 18:34) (100% - 100%)    Parameters below as of 20 Sep 2023 18:34  Patient On (Oxygen Delivery Method): room air        LABS:                        13.1   9.61  )-----------( 309      ( 20 Sep 2023 20:51 )             41.7     09-20    139  |  105  |  14  ----------------------------<  89  3.3<L>   |  21<L>  |  1.02    Ca    9.1      20 Sep 2023 20:51    TPro  8.5<H>  /  Alb  4.6  /  TBili  0.3  /  DBili  x   /  AST  15  /  ALT  24  /  AlkPhos  122<H>  09-20    PT/INR - ( 20 Sep 2023 20:51 )   PT: 11.0 sec;   INR: 0.98 ratio         PTT - ( 20 Sep 2023 20:51 )  PTT:23.6 sec      PHYSICAL EXAM:    AOx3, appropriate, follows commands  PERRL, EOMI, face symmetrical   MCKENNA x 4 with good strength   Sensation intact to light touch   No pronator drift   shunt pumps and refills  abdomen soft, slightly tender

## 2023-09-20 NOTE — ED ADULT NURSE NOTE - NSFALLUNIVINTERV_ED_ALL_ED
Bed/Stretcher in lowest position, wheels locked, appropriate side rails in place/Call bell, personal items and telephone in reach/Instruct patient to call for assistance before getting out of bed/chair/stretcher/Non-slip footwear applied when patient is off stretcher/[place] to call system/Physically safe environment - no spills, clutter or unnecessary equipment/Purposeful proactive rounding/Room/bathroom lighting operational, light cord in reach

## 2023-09-20 NOTE — ED PROVIDER NOTE - PROGRESS NOTE DETAILS
Hussein Aguirre MD  CT without acute changes. Per NSGX, no intervention. Stable for DC with pain management and PCP follow up and strict return precautions

## 2023-09-20 NOTE — ED PROVIDER NOTE - CLINICAL SUMMARY MEDICAL DECISION MAKING FREE TEXT BOX
Patient is a 32y Female w/ PMHx of obstructive hydrocephalus s/p  shunt, vertigo, & neuropathy who presents to ED w/ complaints of headache & fever onset 4 days ago accompanied w/ pain along the course of  shunt. Will consult neurosurgery- reccs appreciated. Concerns for  shunt obstruction and infection of viral or bacterial etiology. Will order CT Head no contrast & abd/pelvis w/ IV contrast. Will order CBC, CMP, blood cx, HCG quant, Lipase,UA/Urine cx, PT/INR, & type and screen. Will order reglan for pain management & nausea.

## 2023-09-20 NOTE — ED PROVIDER NOTE - OBJECTIVE STATEMENT
Patient is a 32y Female w/ PMHx of obstructive hydrocephalus s/p  shunt, vertigo, & neuropathy who presents to ED w/ complaints of headache & fever onset 4 days ago. Patient stated headache is usually present at baseline but has increased in severity since the weekend and is accompanied with fever (highest recorded 100.3F) & abdominal pain. Patient stated that for 1-2 weeks she has been experiencing gradually worsening pain at the site of  shunt that travels down ipsilateral side to the abdomen and is not alleviated by usual pain regimen of medical marijuana. Patient also reports one episode of bowel incontinence when she had no sensation or urge to move her bowels. Patient denies changes in vision/hearing, chest pain, difficulty breathing, vomiting, diarrhea, & dysuria. Patient follows up w/ neurosurgeon, Dr. Cabral at [facility].

## 2023-09-20 NOTE — ED PROVIDER NOTE - NSFOLLOWUPINSTRUCTIONS_ED_ALL_ED_FT
1. You presented to the emergency department for: headaches. There was not found to be new changes to the  shunt. You were assessed by the neurosurgery team and did not require a procedure. You can continue to take Ibuprofen and Tylenol every 6-8 hours as needed for pain. You should follow up with the pain management clinic for your chronic pain symptoms.     2. Your evaluation in the emergency department included a physician evaluation. Your work-up did not reveal any findings indicating the need for admission to the hospital or any emergent interventions at this time.     3. It is recommended that you follow-up with pain management as arranged by the discharge center for a repeat evaluation, and potentially further testing and treatment.     If needed, to arrange an appointment with a primary care provider please call: [phone]    4. Please continue taking your regular medications as prescribed.     For pain you may take 400-600 mg IBUPROFEN or 500-1000mg ACETAMINOPHEN every 6-8 hours - as needed.  This is an over-the-counter medication - please read the instructions for use and warnings on the label. If you have any questions regarding its use, you may refer them to your local pharmacist.    5. PLEASE RETURN TO THE EMERGENCY DEPARTMENT IMMEDIATELY IF you develop any fevers not responding to over the counter medications, uncontrollable nausea and vomiting, an inability to tolerate eating and drinking, difficulty breathing, chest pain, a severe increase in your symptoms or pain, or any other new symptoms that concern you.

## 2023-09-20 NOTE — ED PROVIDER NOTE - PRINCIPAL DIAGNOSIS
Well Child Visit at 2 Years   AMBULATORY CARE:   A well child visit  is when your child sees a healthcare provider to prevent health problems  Well child visits are used to track your child's growth and development  It is also a time for you to ask questions and to get information on how to keep your child safe  Write down your questions so you remember to ask them  Your child should have regular well child visits from birth to 16 years  Development milestones your child may reach by 3 years:  Each child develops at his or her own pace  Your child might have already reached the following milestones, or he or she may reach them later:  Consistently use his or her right or left hand to draw or  objects    Use a toilet, and stop using diapers or only need them at night    Speak in short sentences that are easily understood    Copy simple shapes and draw a person who has at least 2 body parts    Identify self as a boy or a girl    Ride a tricycle    Play interactively with other children, take turns, and name friends    Balance or hop on 1 foot for a short period    Put objects into holes, and stack about 8 cubes    Keep your child safe in the car: Always place your child in a car seat  Choose a seat that meets the Federal Motor Vehicle Safety Standard 213  Make sure the child safety seat has a harness and clip  Also make sure that the harness and clip fit snugly against your child  There should be no more than a finger width of space between the strap and your child's chest  Ask your healthcare provider for more information on car safety seats  Always put your child's car seat in the back seat  Never put your child's car seat in the front  This will help prevent him or her from being injured in an accident  Keep your child safe at home:   Place guards over windows on the second floor or higher  This will prevent your child from falling out of the window  Keep furniture away from windows   Use cordless window shades, or get cords that do not have loops  You can also cut the loops  A child's head can fall through a looped cord, and the cord can become wrapped around his or her neck  Secure heavy or large items  This includes bookshelves, TVs, dressers, cabinets, and lamps  Make sure these items are held in place or nailed into the wall  Keep all medicines, car supplies, lawn supplies, and cleaning supplies out of your child's reach  Keep these items in a locked cabinet or closet  Call Poison Help (4-345.582.6157) if your child eats anything that could be harmful  Keep hot items away from your child  Turn pot handles toward the back on the stove  Keep hot food and liquid out of your child's reach  Do not hold your child while you have a hot item in your hand or are near a lit stove  Do not leave curling irons or similar items on a counter  Your child may grab for the item and burn his or her hand  Store and lock all guns and weapons  Make sure all guns are unloaded before you store them  Make sure your child cannot reach or find where weapons or bullets are kept  Never  leave a loaded gun unattended  Keep your child safe in the sun and near water:   Always keep your child within reach near water  This includes any time you are near ponds, lakes, pools, the ocean, or the bathtub  Never  leave your child alone in the bathtub or sink  A child can drown in less than 1 inch of water  Put sunscreen on your child  Ask your healthcare provider which sunscreen is safe for your child  Do not apply sunscreen to your child's eyes, mouth, or hands  Other ways to keep your child safe: Follow directions on the medicine label when you give your child medicine  Ask your child's healthcare provider for directions if you do not know how to give the medicine  If your child misses a dose, do not double the next dose  Ask how to make up the missed dose  Do not give aspirin to children under 18 years of age  Your child could develop Reye syndrome if he takes aspirin  Reye syndrome can cause life-threatening brain and liver damage  Check your child's medicine labels for aspirin, salicylates, or oil of wintergreen  Keep plastic bags, latex balloons, and small objects away from your child  This includes marbles or small toys  These items can cause choking or suffocation  Regularly check the floor for these objects  Never leave your child alone in a car, house, or yard  Make sure a responsible adult is always with your child  Begin to teach your child how to cross the street safely  Teach your child to stop at the curb, look left, then look right, and left again  Tell your child never to cross the street without an adult  Have your child wear a bicycle helmet  Make sure the helmet fits correctly  Do not buy a larger helmet for your child to grow into  Buy a helmet that fits him or her now  Do not use another kind of helmet, such as for sports  Your child needs to wear the helmet every time he or she rides his or her tricycle  He or she also needs it when he or she is a passenger in a child seat on an adult's bicycle  Ask your child's healthcare provider for more information on bicycle helmets  What you need to know about nutrition for your child:   Give your child a variety of healthy foods  Healthy foods include fruits, vegetables, lean meats, and whole grains  Cut all foods into small pieces  Ask your healthcare provider how much of each type of food your child needs  The following are examples of healthy foods:    Whole grains such as bread, hot or cold cereal, and cooked pasta or rice    Protein from lean meats, chicken, fish, beans, or eggs    Dairy such as whole milk, cheese, or yogurt    Vegetables such as carrots, broccoli, or spinach    Fruits such as strawberries, oranges, apples, or tomatoes       Make sure your child gets enough calcium    Calcium is needed to build strong bones and teeth  Children need about 2 to 3 servings of dairy each day to get enough calcium  Good sources of calcium are low-fat dairy foods (milk, cheese, and yogurt)  A serving of dairy is 8 ounces of milk or yogurt, or 1½ ounces of cheese  Other foods that contain calcium include tofu, kale, spinach, broccoli, almonds, and calcium-fortified orange juice  Ask your child's healthcare provider for more information about the serving sizes of these foods  Limit foods high in fat and sugar  These foods do not have the nutrients your child needs to be healthy  Food high in fat and sugar include snack foods (potato chips, candy, and other sweets), juice, fruit drinks, and soda  If your child eats these foods often, he or she may eat fewer healthy foods during meals  He or she may gain too much weight  Do not give your child foods that could cause him or her to choke  Examples include nuts, popcorn, and hard, raw vegetables  Cut round or hard foods into thin slices  Grapes and hotdogs are examples of round foods  Carrots are an example of hard foods  Give your child 3 meals and 2 to 3 snacks per day  Cut all food into small pieces  Examples of healthy snacks include applesauce, bananas, crackers, and cheese  Have your child eat with other family members  This gives your child the opportunity to watch and learn how others eat  Let your child decide how much to eat  Give your child small portions  Let your child have another serving if he or she asks for one  Your child will be very hungry on some days and want to eat more  For example, your child may want to eat more on days when he or she is more active  Your child may also eat more if he or she is going through a growth spurt  There may be days when your child eats less than usual          Know that picky eating is a normal behavior in children under 3years of age    Your child may like a certain food on one day and then decide he or she does not like it the next day  He or she may eat only 1 or 2 foods for a whole week or longer  Your child may not like mixed foods, or he or she may not want different foods on the plate to touch  These eating habits are all normal  Continue to offer 2 or 3 different foods at each meal, even if your child is going through this phase  Keep your child's teeth healthy:   Your child needs to brush his or her teeth with fluoride toothpaste 2 times each day  He or she also needs to floss 1 time each day  Help your child brush his or her teeth for at least 2 minutes  Apply a small amount of toothpaste the size of a pea on the toothbrush  Make sure your child spits all of the toothpaste out  Your child does not need to rinse his or her mouth with water  The small amount of toothpaste that stays in his or her mouth can help prevent cavities  Help your child brush and floss until he or she gets older and can do it properly  Take your child to the dentist regularly  A dentist can make sure your child's teeth and gums are developing properly  Your child may be given a fluoride treatment to prevent cavities  Ask your child's dentist how often he or she needs to visit  Create routines for your child:   Have your child take at least 1 nap each day  Plan the nap early enough in the day so your child is still tired at bedtime  At 3 years, your child might stop needing an afternoon nap  Create a bedtime routine  This may include 1 hour of calm and quiet activities before bed  You can read to your child or listen to music  Brush your child's teeth during his or her bedtime routine  Plan for family time  Start family traditions such as going for a walk, listening to music, or playing games  Do not watch TV during family time  Have your child play with other family members during family time  Other ways to support your child:   Do not punish your child with hitting, spanking, or yelling    Tell your child "no " Give your child short and simple rules  Do not allow him or her to hit, kick, or bite another person  Put your child in time-out for up to 3 minutes in a safe place  You can distract your child with a new activity when he or she behaves badly  Make sure everyone who cares for your child disciplines him or her the same way  Be firm and consistent with tantrums  Temper tantrums are normal at 3 years  Your child may cry, yell, kick, or refuse to do what he or she is told  Stay calm and be firm  Reward your child for good behavior  This will encourage him or her to behave well  Read to your child  This will comfort your child and help his or her brain develop  Point to pictures as you read  This will help your child make connections between pictures and words  Have other family members or caregivers read to your child  Read street and store signs when you are out with your child  Have your child say words he or she recognizes, such as "stop "         Play with your child  This will help your child develop social skills, motor skills, and speech  Take your child to play groups or activities  Let your child play with other children  This will help him or her grow and develop  Your child will start wanting to play more with other children at 3 years  He or she may also start learning how to take turns  Engage with your child if he or she watches TV  Do not let your child watch TV alone, if possible  You or another adult should watch with your child  Talk with your child about what he or she is watching  When TV time is done, try to apply what you and your child saw  For example, if your child saw someone stacking blocks, have your child stack his or her blocks  TV time should never replace active playtime  Turn the TV off when your child plays  Do not let your child watch TV during meals or within 1 hour of bedtime  Limit your child's screen time    Screen time is the amount of television, computer, smart phone, and video game time your child has each day  It is important to limit screen time  This helps your child get enough sleep, physical activity, and social interaction each day  Your child's pediatrician can help you create a screen time plan  The daily limit is usually 1 hour for children 2 to 5 years  The daily limit is usually 2 hours for children 6 years or older  You can also set limits on the kinds of devices your child can use, and where he or she can use them  Keep the plan where your child and anyone who takes care of him or her can see it  Create a plan for each child in your family  You can also go to [employer]/English/media/Pages/default  aspx#planview for more help creating a plan  Limit your child's inactivity  During the hours your child is awake, limit inactivity to 1 hour at a time  Encourage your child to ride his or her tricycle, play with a friend, or run around  Plan activities for your family to be active together  Activity will help your child develop muscles and coordination  Activity will also help him or her maintain a healthy weight  What you need to know about your child's next well child visit:  Your child's healthcare provider will tell you when to bring him or her in again  The next well child visit is usually at 4 years  Contact your child's healthcare provider if you have questions or concerns about your child's health or care before the next visit  All children aged 3 to 5 years should have at least one vision screening  Your child may need vaccines at the next well child visit  Your provider will tell you which vaccines your child needs and when your child should get them  © Copyright [address] 2022 Information is for End User's use only and may not be sold, redistributed or otherwise used for commercial purposes   All illustrations and images included in CareNotes® are the copyrighted property of [employer]  or Liya Bhatia  The above information is an  only  It is not intended as medical advice for individual conditions or treatments  Talk to your doctor, nurse or pharmacist before following any medical regimen to see if it is safe and effective for you  Headache

## 2023-09-20 NOTE — ED PROVIDER NOTE - ATTENDING CONTRIBUTION TO CARE
GEN - NAD; well appearing; A+O x3   HEAD - NC/AT   EYES- PERRL, EOMI, clear conjunctiva  ENT: Airway patent, mmm, Oral cavity and pharynx normal. No inflammation, swelling, exudate, or lesions.  NECK: Neck supple  PULMONARY - CTA b/l, symmetric breath sounds.   CARDIAC -s1s2, RRR, no M,G,R  ABDOMEN - +BS, ND, NT, soft, no guarding, no rebound, no masses   BACK - no CVA tenderness, Normal  spine   EXTREMITIES - FROM, no edema   SKIN - no rash or bruising   NEUROLOGIC - ao3, cn2-12 intact, 5/5 strength in all extremities, sensation grossly intact, f-n nl, no dysdiadochokinesia  PSYCH -nl mood/affect, nl insight.  agree with above hpi, on my exam she is nontoxic appearing, unlabored breathing, clear lungs, abd soft with generalized ttp, no edema, no localizing neuro deficits on her exam. Plan for labs, ct brain/abd/pelvis, ua/cx, rvp, shunt series, eval for diff including but not limited to shunt malfunction, ich, elec disturbance, organ dysfunction, infections, symptomatic tx. Patient informed and agreeable. Neurosurgery consulted and has already evaluated patient at bedside, agreeable with plan.

## 2023-09-20 NOTE — ED PROVIDER NOTE - ENMT NEGATIVE STATEMENT, MLM
Ears: no ear pain and no hearing problems. Nose: no nasal congestion and no nasal drainage. Mouth/Throat: no dysphagia, no hoarseness and no throat pain. Neck: no pain or stiffness

## 2023-09-20 NOTE — ED ADULT NURSE NOTE - OBJECTIVE STATEMENT
pt received in room 20. pt is AAOX4 and ambulatory with a cane. pt has hx of hydrocephalus. pt has  shunt. pt c/o headaches, dizziness, incontinence, nausea and fevers for 2 weeks, symptoms progressively getting worse and having pain travel from shunt down to abdomen. pt uses medical marijuana for pain. highest fever 100.3F, pt afebrile in ED. pt last surgery 2017. pt denies chest pain, SOB, changes in vision and urinary symptoms. pt RR are even and unlabored. pt has US guided IV in LAC, labs drawn and sent and pt medicated as ordered. Mother at bedside. Call bell within reach. Safety maintained.

## 2023-09-21 RX ORDER — KETOROLAC TROMETHAMINE 30 MG/ML
15 SYRINGE (ML) INJECTION ONCE
Refills: 0 | Status: DISCONTINUED | OUTPATIENT
Start: 2023-09-21 | End: 2023-09-21

## 2023-09-21 RX ADMIN — Medication 15 MILLIGRAM(S): at 00:59

## 2023-09-21 NOTE — CONSULT NOTE ADULT - SUBJECTIVE AND OBJECTIVE BOX
[facility] DEPARTMENT OF OPHTHALMOLOGY - INITIAL ADULT CONSULT  -----------------------------------------------------------------------------  Jagjit Russell MD, PGY-2  Contact: TEAMS  -----------------------------------------------------------------------------    HPI:  isaias is a 32y Female w/ PMHx of obstructive hydrocephalus s/p  shunt, vertigo, & neuropathy who presents to ED w/ complaints of headache & fever onset 4 days ago. Patient stated headache is usually present at baseline but has increased in severity since the weekend and is accompanied with fever (highest recorded 100.3F) & abdominal pain. Patient stated that for 1-2 weeks she has been experiencing gradually worsening pain at the site of  shunt that travels down ipsilateral side to the abdomen and is not alleviated by usual pain regimen of medical marijuana. Patient also reports one episode of bowel incontinence when she had no sensation or urge to move her bowels. Patient denies changes in vision/hearing, chest pain, difficulty breathing, vomiting, diarrhea, & dysuria. Patient follows up w/ neurosurgeon, Dr. Cabral at [facility].      Interval History: ophthalmology consulted for papilledema rule out. Patient without any acute vision complaints. Reports baseline poorer vision OS compared to OD.     PMH: as above   POcHx: denies surg/laser  FH: denies glc/amd  Social History: denies etoh/tobacco  Ophthalmic Medications: none  Allergies: NKDA    Review of Systems:  Constitutional: No fever, chills  Eyes: No blurry vision, flashes, floaters, FBS, erythema, discharge, double vision, OU  Neuro: No tremors  Cardiovascular: No chest pain, palpitations  Respiratory: No SOB, no cough  GI: No nausea, vomiting, abdominal pain  : No dysuria  Skin: no rash  Psych: no depression  Endocrine: no polyuria, polydipsia  Heme/lymph: no swelling    VITALS: T(C): 37 (09-20-23 @ 23:02)  T(F): 98.6 (09-20-23 @ 23:02), Max: 98.9 (09-20-23 @ 18:34)  HR: 92 (09-20-23 @ 23:02) (92 - 108)  BP: 126/84 (09-20-23 @ 23:02) (126/84 - 147/106)  RR:  (20 - 20)  SpO2:  (100% - 100%)  Wt(kg): --  General: AAO x 3, appropriate mood and affect    Ophthalmology Exam:  Visual acuity (sc): 20/20 OD, 20/30 OS  Pupils: PERRL OU, no APD  Ttono: 16 OD, 16 OS  Extraocular movements (EOMs): Full OU, no pain, no diplopia  Confrontational Visual Field (CVF): Full OD, full OS  Color Plates: 12/12 OD, 12/12 OS    Pen Light Exam (PLE)  External: Flat OU  Lids/Lashes/Lacrimal Ducts: Flat OU    Sclera/Conjunctiva: W+Q OU  Cornea: Cl OU  Anterior Chamber: D+F OU    Iris: Flat OU  Lens: Cl OU    Fundus Exam: dilated with 1% tropicamide and 2.5% phenylephrine  Approval obtained from primary team for dilation  Patient aware that pupils can remained dilated for at least 4-6 hours  Exam performed with 20D lens    Vitreous: wnl OU  Disc, cup/disc: sharp and pink, 0.4 OU; no disc edema OU  Macula: wnl OU  Vessels: wnl OU  Periphery: wnl OU    Labs/Imaging:  *** [facility] DEPARTMENT OF OPHTHALMOLOGY - INITIAL ADULT CONSULT  -----------------------------------------------------------------------------  Jagjit Russell MD, PGY-2  Contact: TEAMS  -----------------------------------------------------------------------------    HPI:  Patient is a 32y Female w/ PMHx of obstructive hydrocephalus s/p  shunt, vertigo, & neuropathy who presents to ED w/ complaints of headache & fever onset 4 days ago. Patient stated headache is usually present at baseline but has increased in severity since the weekend and is accompanied with fever (highest recorded 100.3F) & abdominal pain. Patient stated that for 1-2 weeks she has been experiencing gradually worsening pain at the site of  shunt that travels down ipsilateral side to the abdomen and is not alleviated by usual pain regimen of medical marijuana. Patient also reports one episode of bowel incontinence when she had no sensation or urge to move her bowels. Patient denies changes in vision/hearing, chest pain, difficulty breathing, vomiting, diarrhea, & dysuria. Patient follows up w/ neurosurgeon, Dr. Cabral at [facility].      Interval History: ophthalmology consulted for papilledema rule out. Patient without any acute vision complaints. Reports baseline poorer vision OS compared to OD.     PMH: as above   POcHx: denies surg/laser  FH: denies glc/amd  Social History: denies etoh/tobacco  Ophthalmic Medications: none  Allergies: NKDA    Review of Systems:  Constitutional: No fever, chills  Eyes: No blurry vision, flashes, floaters, FBS, erythema, discharge, double vision, OU  Neuro: No tremors  Cardiovascular: No chest pain, palpitations  Respiratory: No SOB, no cough  GI: No nausea, vomiting, abdominal pain  : No dysuria  Skin: no rash  Psych: no depression  Endocrine: no polyuria, polydipsia  Heme/lymph: no swelling    VITALS: T(C): 37 (09-20-23 @ 23:02)  T(F): 98.6 (09-20-23 @ 23:02), Max: 98.9 (09-20-23 @ 18:34)  HR: 92 (09-20-23 @ 23:02) (92 - 108)  BP: 126/84 (09-20-23 @ 23:02) (126/84 - 147/106)  RR:  (20 - 20)  SpO2:  (100% - 100%)  Wt(kg): --  General: AAO x 3, appropriate mood and affect    Ophthalmology Exam:  Visual acuity (sc): 20/20 OD, 20/30 OS  Pupils: PERRL OU, no APD  Ttono: 16 OD, 16 OS  Extraocular movements (EOMs): Full OU, no pain, no diplopia  Confrontational Visual Field (CVF): Full OD, full OS  Color Plates: 12/12 OD, 12/12 OS    Pen Light Exam (PLE)  External: Flat OU  Lids/Lashes/Lacrimal Ducts: Flat OU    Sclera/Conjunctiva: W+Q OU  Cornea: Cl OU  Anterior Chamber: D+F OU    Iris: Flat OU  Lens: Cl OU    Fundus Exam: dilated with 1% tropicamide and 2.5% phenylephrine  Approval obtained from primary team for dilation  Patient aware that pupils can remained dilated for at least 4-6 hours  Exam performed with 20D lens    Vitreous: wnl OU  Disc, cup/disc: sharp and pink, 0.4 OU; no disc edema OU  Macula: wnl OU  Vessels: wnl OU  Periphery: wnl OU    Labs/Imaging:  ***

## 2023-09-21 NOTE — CONSULT NOTE ADULT - ASSESSMENT
32y female w/ pmhx/ochx of  hydrocephalus with  shunt and multiple revisions consulted for papilledema. Posterior segment exam with 20D lens after dilation revealed no evidence of optic nerve swelling.     # Papilledema eval  - Vision intact, no sign of optic nerve dysfunction  - Posterior segment exam shows no evidence of optic nerve swelling  - Absence of disc swelling does not rule out elevated intracranial pressure   - Rest of care per primary team/neurosurgery  - Findings and plan discussed with patient and primary team.      Outpatient follow-up: Patient should follow-up with his/her ophthalmologist or with [facility] Department of Ophthalmology at the address below     [address]  [place], [place] [id number] [phone]  
33yo female hx hydrocephalus, VPS strata, last revised in 2017 proximal (many revisions prior), Anterior cranial vault expansion 2015, ICP monitors, who presents with HA and pain along shunt track x 2 weeks, reported fever at home (100.3), and abdominal pain that started yesterday.

## 2023-09-23 LAB
CULTURE RESULTS: SIGNIFICANT CHANGE UP
SPECIMEN SOURCE: SIGNIFICANT CHANGE UP

## 2023-09-26 ENCOUNTER — OUTPATIENT (OUTPATIENT)
Dept: OUTPATIENT SERVICES | Facility: HOSPITAL | Age: 33
LOS: 1 days | End: 2023-09-26

## 2023-09-26 ENCOUNTER — APPOINTMENT (OUTPATIENT)
Dept: MRI IMAGING | Facility: HOSPITAL | Age: 33
End: 2023-09-26
Payer: MEDICAID

## 2023-09-26 DIAGNOSIS — R42 DIZZINESS AND GIDDINESS: ICD-10-CM

## 2023-09-26 DIAGNOSIS — R51.9 HEADACHE, UNSPECIFIED: ICD-10-CM

## 2023-09-26 DIAGNOSIS — Z98.890 OTHER SPECIFIED POSTPROCEDURAL STATES: Chronic | ICD-10-CM

## 2023-09-26 PROCEDURE — 70551 MRI BRAIN STEM W/O DYE: CPT | Mod: 26

## 2023-11-03 ENCOUNTER — OUTPATIENT (OUTPATIENT)
Dept: OUTPATIENT SERVICES | Facility: HOSPITAL | Age: 33
LOS: 1 days | End: 2023-11-03

## 2023-11-03 ENCOUNTER — APPOINTMENT (OUTPATIENT)
Dept: MRI IMAGING | Facility: HOSPITAL | Age: 33
End: 2023-11-03
Payer: MEDICAID

## 2023-11-03 DIAGNOSIS — Z98.890 OTHER SPECIFIED POSTPROCEDURAL STATES: Chronic | ICD-10-CM

## 2023-11-03 DIAGNOSIS — M54.2 CERVICALGIA: ICD-10-CM

## 2023-11-03 PROCEDURE — 72141 MRI NECK SPINE W/O DYE: CPT | Mod: 26

## 2023-11-26 ENCOUNTER — INPATIENT (INPATIENT)
Facility: HOSPITAL | Age: 33
LOS: 1 days | Discharge: HOME HEALTH | End: 2023-11-28
Attending: STUDENT IN AN ORGANIZED HEALTH CARE EDUCATION/TRAINING PROGRAM | Admitting: STUDENT IN AN ORGANIZED HEALTH CARE EDUCATION/TRAINING PROGRAM
Payer: MEDICAID

## 2023-11-26 VITALS
TEMPERATURE: 99 F | OXYGEN SATURATION: 100 % | DIASTOLIC BLOOD PRESSURE: 86 MMHG | SYSTOLIC BLOOD PRESSURE: 136 MMHG | RESPIRATION RATE: 19 BRPM | HEART RATE: 108 BPM

## 2023-11-26 DIAGNOSIS — Z98.890 OTHER SPECIFIED POSTPROCEDURAL STATES: Chronic | ICD-10-CM

## 2023-11-26 DIAGNOSIS — R53.1 WEAKNESS: ICD-10-CM

## 2023-11-26 LAB
ALBUMIN SERPL ELPH-MCNC: 4.4 G/DL — SIGNIFICANT CHANGE UP (ref 3.3–5)
ALBUMIN SERPL ELPH-MCNC: 4.4 G/DL — SIGNIFICANT CHANGE UP (ref 3.3–5)
ALP SERPL-CCNC: 112 U/L — SIGNIFICANT CHANGE UP (ref 40–120)
ALP SERPL-CCNC: 112 U/L — SIGNIFICANT CHANGE UP (ref 40–120)
ALT FLD-CCNC: 21 U/L — SIGNIFICANT CHANGE UP (ref 4–33)
ALT FLD-CCNC: 21 U/L — SIGNIFICANT CHANGE UP (ref 4–33)
ANION GAP SERPL CALC-SCNC: 11 MMOL/L — SIGNIFICANT CHANGE UP (ref 7–14)
ANION GAP SERPL CALC-SCNC: 11 MMOL/L — SIGNIFICANT CHANGE UP (ref 7–14)
APPEARANCE UR: CLEAR — SIGNIFICANT CHANGE UP
APPEARANCE UR: CLEAR — SIGNIFICANT CHANGE UP
AST SERPL-CCNC: 12 U/L — SIGNIFICANT CHANGE UP (ref 4–32)
AST SERPL-CCNC: 12 U/L — SIGNIFICANT CHANGE UP (ref 4–32)
BASOPHILS # BLD AUTO: 0.02 K/UL — SIGNIFICANT CHANGE UP (ref 0–0.2)
BASOPHILS # BLD AUTO: 0.02 K/UL — SIGNIFICANT CHANGE UP (ref 0–0.2)
BASOPHILS NFR BLD AUTO: 0.2 % — SIGNIFICANT CHANGE UP (ref 0–2)
BASOPHILS NFR BLD AUTO: 0.2 % — SIGNIFICANT CHANGE UP (ref 0–2)
BILIRUB SERPL-MCNC: <0.2 MG/DL — SIGNIFICANT CHANGE UP (ref 0.2–1.2)
BILIRUB SERPL-MCNC: <0.2 MG/DL — SIGNIFICANT CHANGE UP (ref 0.2–1.2)
BILIRUB UR-MCNC: NEGATIVE — SIGNIFICANT CHANGE UP
BILIRUB UR-MCNC: NEGATIVE — SIGNIFICANT CHANGE UP
BUN SERPL-MCNC: 13 MG/DL — SIGNIFICANT CHANGE UP (ref 7–23)
BUN SERPL-MCNC: 13 MG/DL — SIGNIFICANT CHANGE UP (ref 7–23)
CALCIUM SERPL-MCNC: 9.2 MG/DL — SIGNIFICANT CHANGE UP (ref 8.4–10.5)
CALCIUM SERPL-MCNC: 9.2 MG/DL — SIGNIFICANT CHANGE UP (ref 8.4–10.5)
CHLORIDE SERPL-SCNC: 106 MMOL/L — SIGNIFICANT CHANGE UP (ref 98–107)
CHLORIDE SERPL-SCNC: 106 MMOL/L — SIGNIFICANT CHANGE UP (ref 98–107)
CO2 SERPL-SCNC: 24 MMOL/L — SIGNIFICANT CHANGE UP (ref 22–31)
CO2 SERPL-SCNC: 24 MMOL/L — SIGNIFICANT CHANGE UP (ref 22–31)
COLOR SPEC: YELLOW — SIGNIFICANT CHANGE UP
COLOR SPEC: YELLOW — SIGNIFICANT CHANGE UP
CREAT SERPL-MCNC: 0.86 MG/DL — SIGNIFICANT CHANGE UP (ref 0.5–1.3)
CREAT SERPL-MCNC: 0.86 MG/DL — SIGNIFICANT CHANGE UP (ref 0.5–1.3)
DIFF PNL FLD: NEGATIVE — SIGNIFICANT CHANGE UP
DIFF PNL FLD: NEGATIVE — SIGNIFICANT CHANGE UP
EGFR: 91 ML/MIN/1.73M2 — SIGNIFICANT CHANGE UP
EGFR: 91 ML/MIN/1.73M2 — SIGNIFICANT CHANGE UP
EOSINOPHIL # BLD AUTO: 0.02 K/UL — SIGNIFICANT CHANGE UP (ref 0–0.5)
EOSINOPHIL # BLD AUTO: 0.02 K/UL — SIGNIFICANT CHANGE UP (ref 0–0.5)
EOSINOPHIL NFR BLD AUTO: 0.2 % — SIGNIFICANT CHANGE UP (ref 0–6)
EOSINOPHIL NFR BLD AUTO: 0.2 % — SIGNIFICANT CHANGE UP (ref 0–6)
GLUCOSE SERPL-MCNC: 99 MG/DL — SIGNIFICANT CHANGE UP (ref 70–99)
GLUCOSE SERPL-MCNC: 99 MG/DL — SIGNIFICANT CHANGE UP (ref 70–99)
GLUCOSE UR QL: NEGATIVE MG/DL — SIGNIFICANT CHANGE UP
GLUCOSE UR QL: NEGATIVE MG/DL — SIGNIFICANT CHANGE UP
HCG SERPL-ACNC: <1 MIU/ML — SIGNIFICANT CHANGE UP
HCG SERPL-ACNC: <1 MIU/ML — SIGNIFICANT CHANGE UP
HCT VFR BLD CALC: 40.8 % — SIGNIFICANT CHANGE UP (ref 34.5–45)
HCT VFR BLD CALC: 40.8 % — SIGNIFICANT CHANGE UP (ref 34.5–45)
HGB BLD-MCNC: 13.4 G/DL — SIGNIFICANT CHANGE UP (ref 11.5–15.5)
HGB BLD-MCNC: 13.4 G/DL — SIGNIFICANT CHANGE UP (ref 11.5–15.5)
IANC: 8.57 K/UL — HIGH (ref 1.8–7.4)
IANC: 8.57 K/UL — HIGH (ref 1.8–7.4)
IMM GRANULOCYTES NFR BLD AUTO: 0.4 % — SIGNIFICANT CHANGE UP (ref 0–0.9)
IMM GRANULOCYTES NFR BLD AUTO: 0.4 % — SIGNIFICANT CHANGE UP (ref 0–0.9)
KETONES UR-MCNC: ABNORMAL MG/DL
KETONES UR-MCNC: ABNORMAL MG/DL
LEUKOCYTE ESTERASE UR-ACNC: NEGATIVE — SIGNIFICANT CHANGE UP
LEUKOCYTE ESTERASE UR-ACNC: NEGATIVE — SIGNIFICANT CHANGE UP
LYMPHOCYTES # BLD AUTO: 26.4 % — SIGNIFICANT CHANGE UP (ref 13–44)
LYMPHOCYTES # BLD AUTO: 26.4 % — SIGNIFICANT CHANGE UP (ref 13–44)
LYMPHOCYTES # BLD AUTO: 3.36 K/UL — HIGH (ref 1–3.3)
LYMPHOCYTES # BLD AUTO: 3.36 K/UL — HIGH (ref 1–3.3)
MAGNESIUM SERPL-MCNC: 2.3 MG/DL — SIGNIFICANT CHANGE UP (ref 1.6–2.6)
MAGNESIUM SERPL-MCNC: 2.3 MG/DL — SIGNIFICANT CHANGE UP (ref 1.6–2.6)
MCHC RBC-ENTMCNC: 24.2 PG — LOW (ref 27–34)
MCHC RBC-ENTMCNC: 24.2 PG — LOW (ref 27–34)
MCHC RBC-ENTMCNC: 32.8 GM/DL — SIGNIFICANT CHANGE UP (ref 32–36)
MCHC RBC-ENTMCNC: 32.8 GM/DL — SIGNIFICANT CHANGE UP (ref 32–36)
MCV RBC AUTO: 73.6 FL — LOW (ref 80–100)
MCV RBC AUTO: 73.6 FL — LOW (ref 80–100)
MONOCYTES # BLD AUTO: 0.69 K/UL — SIGNIFICANT CHANGE UP (ref 0–0.9)
MONOCYTES # BLD AUTO: 0.69 K/UL — SIGNIFICANT CHANGE UP (ref 0–0.9)
MONOCYTES NFR BLD AUTO: 5.4 % — SIGNIFICANT CHANGE UP (ref 2–14)
MONOCYTES NFR BLD AUTO: 5.4 % — SIGNIFICANT CHANGE UP (ref 2–14)
NEUTROPHILS # BLD AUTO: 8.57 K/UL — HIGH (ref 1.8–7.4)
NEUTROPHILS # BLD AUTO: 8.57 K/UL — HIGH (ref 1.8–7.4)
NEUTROPHILS NFR BLD AUTO: 67.4 % — SIGNIFICANT CHANGE UP (ref 43–77)
NEUTROPHILS NFR BLD AUTO: 67.4 % — SIGNIFICANT CHANGE UP (ref 43–77)
NITRITE UR-MCNC: NEGATIVE — SIGNIFICANT CHANGE UP
NITRITE UR-MCNC: NEGATIVE — SIGNIFICANT CHANGE UP
NRBC # BLD: 0 /100 WBCS — SIGNIFICANT CHANGE UP (ref 0–0)
NRBC # BLD: 0 /100 WBCS — SIGNIFICANT CHANGE UP (ref 0–0)
NRBC # FLD: 0 K/UL — SIGNIFICANT CHANGE UP (ref 0–0)
NRBC # FLD: 0 K/UL — SIGNIFICANT CHANGE UP (ref 0–0)
PH UR: 7.5 — SIGNIFICANT CHANGE UP (ref 5–8)
PH UR: 7.5 — SIGNIFICANT CHANGE UP (ref 5–8)
PHOSPHATE SERPL-MCNC: 2.2 MG/DL — LOW (ref 2.5–4.5)
PHOSPHATE SERPL-MCNC: 2.2 MG/DL — LOW (ref 2.5–4.5)
PLATELET # BLD AUTO: 370 K/UL — SIGNIFICANT CHANGE UP (ref 150–400)
PLATELET # BLD AUTO: 370 K/UL — SIGNIFICANT CHANGE UP (ref 150–400)
POTASSIUM SERPL-MCNC: 4.4 MMOL/L — SIGNIFICANT CHANGE UP (ref 3.5–5.3)
POTASSIUM SERPL-MCNC: 4.4 MMOL/L — SIGNIFICANT CHANGE UP (ref 3.5–5.3)
POTASSIUM SERPL-SCNC: 4.4 MMOL/L — SIGNIFICANT CHANGE UP (ref 3.5–5.3)
POTASSIUM SERPL-SCNC: 4.4 MMOL/L — SIGNIFICANT CHANGE UP (ref 3.5–5.3)
PROT SERPL-MCNC: 7.6 G/DL — SIGNIFICANT CHANGE UP (ref 6–8.3)
PROT SERPL-MCNC: 7.6 G/DL — SIGNIFICANT CHANGE UP (ref 6–8.3)
PROT UR-MCNC: SIGNIFICANT CHANGE UP MG/DL
PROT UR-MCNC: SIGNIFICANT CHANGE UP MG/DL
RBC # BLD: 5.54 M/UL — HIGH (ref 3.8–5.2)
RBC # BLD: 5.54 M/UL — HIGH (ref 3.8–5.2)
RBC # FLD: 14.1 % — SIGNIFICANT CHANGE UP (ref 10.3–14.5)
RBC # FLD: 14.1 % — SIGNIFICANT CHANGE UP (ref 10.3–14.5)
SODIUM SERPL-SCNC: 141 MMOL/L — SIGNIFICANT CHANGE UP (ref 135–145)
SODIUM SERPL-SCNC: 141 MMOL/L — SIGNIFICANT CHANGE UP (ref 135–145)
SP GR SPEC: 1.09 — HIGH (ref 1–1.03)
SP GR SPEC: 1.09 — HIGH (ref 1–1.03)
UROBILINOGEN FLD QL: 0.2 MG/DL — SIGNIFICANT CHANGE UP (ref 0.2–1)
UROBILINOGEN FLD QL: 0.2 MG/DL — SIGNIFICANT CHANGE UP (ref 0.2–1)
WBC # BLD: 12.71 K/UL — HIGH (ref 3.8–10.5)
WBC # BLD: 12.71 K/UL — HIGH (ref 3.8–10.5)
WBC # FLD AUTO: 12.71 K/UL — HIGH (ref 3.8–10.5)
WBC # FLD AUTO: 12.71 K/UL — HIGH (ref 3.8–10.5)

## 2023-11-26 PROCEDURE — 70450 CT HEAD/BRAIN W/O DYE: CPT | Mod: 26,MA

## 2023-11-26 PROCEDURE — 70496 CT ANGIOGRAPHY HEAD: CPT | Mod: 26,MA

## 2023-11-26 PROCEDURE — 71045 X-RAY EXAM CHEST 1 VIEW: CPT | Mod: 26

## 2023-11-26 PROCEDURE — 70498 CT ANGIOGRAPHY NECK: CPT | Mod: 26,MA

## 2023-11-26 PROCEDURE — 0042T: CPT

## 2023-11-26 PROCEDURE — 70250 X-RAY EXAM OF SKULL: CPT | Mod: 26

## 2023-11-26 PROCEDURE — 74018 RADEX ABDOMEN 1 VIEW: CPT | Mod: 26

## 2023-11-26 PROCEDURE — 99285 EMERGENCY DEPT VISIT HI MDM: CPT

## 2023-11-26 RX ORDER — SODIUM CHLORIDE 9 MG/ML
1000 INJECTION, SOLUTION INTRAVENOUS
Refills: 0 | Status: DISCONTINUED | OUTPATIENT
Start: 2023-11-26 | End: 2023-11-28

## 2023-11-26 RX ORDER — ACETAMINOPHEN 500 MG
1000 TABLET ORAL ONCE
Refills: 0 | Status: COMPLETED | OUTPATIENT
Start: 2023-11-26 | End: 2023-11-26

## 2023-11-26 RX ORDER — SODIUM CHLORIDE 9 MG/ML
1000 INJECTION, SOLUTION INTRAVENOUS ONCE
Refills: 0 | Status: COMPLETED | OUTPATIENT
Start: 2023-11-26 | End: 2023-11-26

## 2023-11-26 RX ORDER — DIPHENHYDRAMINE HCL 50 MG
50 CAPSULE ORAL ONCE
Refills: 0 | Status: COMPLETED | OUTPATIENT
Start: 2023-11-26 | End: 2023-11-26

## 2023-11-26 RX ADMIN — Medication 1000 MILLIGRAM(S): at 18:31

## 2023-11-26 RX ADMIN — Medication 400 MILLIGRAM(S): at 18:01

## 2023-11-26 RX ADMIN — SODIUM CHLORIDE 1000 MILLILITER(S): 9 INJECTION, SOLUTION INTRAVENOUS at 23:10

## 2023-11-26 RX ADMIN — Medication 50 MILLIGRAM(S): at 19:45

## 2023-11-26 NOTE — ED PROVIDER NOTE - OBJECTIVE STATEMENT
33 female chief complaint of obstructive hydrocephalus with a  shunt and neuropathy patient coming into the ED with a left upper and lower extremity weakness and whole body tingling patient at baseline walks with a cane but states that she cannot walk symptom started at 7 AM this morning and been persistent in nature.  Denies any fever nausea vomiting    Patient has had neuropathy worked up ultimately with neurology and neurosurgery and surgery patient states that it has been an inconclusive workup

## 2023-11-26 NOTE — ED PROVIDER NOTE - CLINICAL SUMMARY MEDICAL DECISION MAKING FREE TEXT BOX
given hx and pe concern for possible e- vs lower concern for meningitis given pt is mentating but cannot walk will ultimately need to be admitted will need neuro consult as well.

## 2023-11-26 NOTE — ED ADULT NURSE REASSESSMENT NOTE - NS ED NURSE REASSESS COMMENT FT1
Received report from day shift JUAN LUIS Dai. Pt lying in stretcher, in no acute distress. Pt C/O numbness and tingling sensation all throughout her body that began this morning. Pt states "I normally have this numbness feeling but today it is much worse." Pt states she was unable to walk which prompted her to come to ED. Sensation is equal on both sides. Pt unable to grasp my hands or press down on my hands with their feet. Pt able to perform passive ROM with assistance for all 4 extremities. IV is patent and intact. Pending bed assignment. Received report from day shift JUAN LUIS Dai. Pt lying in stretcher, in no acute distress. Pt C/O numbness and tingling sensation all throughout her body that began this morning. Pt states "I normally have this numbness feeling but today it is much worse." Pt states she was unable to walk which prompted her to come to ED. Sensation is equal on both sides. Pt able to perform passive ROM with assistance for all 4 extremities. IV is patent and intact. Pending bed assignment.

## 2023-11-26 NOTE — ED ADULT NURSE NOTE - NSICDXPASTMEDICALHX_GEN_ALL_CORE_FT
PAST MEDICAL HISTORY:  Craniosynostosis     Neuropathy R side of the body    Obstructive hydrocephalus  shunt placed at birth with multiple revisions for malfunction. LAST REVISION 02/2015    Personal History of Hydrocephalus (ICD9 V12.49)     Strabismus     Vertigo     
no

## 2023-11-26 NOTE — CONSULT NOTE ADULT - ATTENDING COMMENTS
She says that she periodically has these episodes of tingling of one side or the other and inability to move that resolve and her neurologist has not been able to find a cause.    Exam:  Motor: variable effort but 5/5 throughout.   Reflexes: 3 throughout, toes mute.  No Albina's sign, B.       < from: MR Cervical Spine No Cont (11.03.23 @ 14:53) >  Loss of the normal cervical lordosis seen.  Scoliosis is again seen  The vertebral body height alignment and marrow signal appears normal  Mild disc desiccation is seen involving the C4-5 and C5-6 level which is   secondary to chronic degenerative change  C2-3: Normal  C3-4: Normal  C4-5: Normal  C5-6: Normal  C6-7: Normal  C7-T1: Normal  T1-2: Normal  The spinal cord demonstrates normal signal and caliber.  Evaluation of the paraspinal soft tissues appear unremarkable.  There is evidence of a shunt seen involving the posterior right neck   region which does cause some artifact.  IMPRESSION: Mild degenerative changes as described above.  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  Small interhemispheric cyst is again seen.  The lateral ventricles appear slitlike without hydrocephalus identified  Right parietal cynthia hole and shunt catheter is again seen and unchanged   in position.  Encephalomalacia and gliosis involving the right posterior   temporal/occipital region is again seen and unchanged  Small area of gliosis involving the left frontal subcortical region is   identified which could be compatible with gliosis secondary to prior   shunt catheter. Please correlate clinically.  Evaluation of the brain parenchyma appears unchanged.  Small polyp versus retention seen involving posterior right ethmoid sinus  Both mastoid and middle ear regions appear clear.  IMPRESSION: Overall no significant change when allowing for differences   in technique.  < end of copied text >    < from: CT Angio Neck w/ IV Cont (11.26.23 @ 19:31) >  IMPRESSION:  CTA BRAIN AND NECK: Unremarkable. No arterial steno-occlusive disease.  CT PERFUSION: No perfusion deficit per parameters above. Mild Tmax   (between 4-6 seconds) corresponds to posterior cerebral sites of known   congential anomaly.  < end of copied text >    < from: CT Head No Cont (11.26.23 @ 17:24) >  IMPRESSION:  No change compared to the 09/20/2023.  < end of copied text >    A/P  Ms. Villalobos is a 32 yo woman with a h/o hydrocephalus with  Shunt with a h/o multiple episodes of unilateral paresthesias and weakness of uncertain etiology.  She has a normal neurological examination with variable effort on motor exam.    If she is able to be discharged home safely - can do MRI brain as outpatient.   Thank you She says that she periodically has these episodes of tingling of one side or the other and inability to move that resolve and her neurologist has not been able to find a cause.    Exam:  Motor: variable effort but 5/5 throughout.   Reflexes: 3 throughout, toes mute.  No Albina's sign, B.       < from: MR Cervical Spine No Cont (11.03.23 @ 14:53) >  Loss of the normal cervical lordosis seen.  Scoliosis is again seen  The vertebral body height alignment and marrow signal appears normal  Mild disc desiccation is seen involving the C4-5 and C5-6 level which is   secondary to chronic degenerative change  C2-3: Normal  C3-4: Normal  C4-5: Normal  C5-6: Normal  C6-7: Normal  C7-T1: Normal  T1-2: Normal  The spinal cord demonstrates normal signal and caliber.  Evaluation of the paraspinal soft tissues appear unremarkable.  There is evidence of a shunt seen involving the posterior right neck   region which does cause some artifact.  IMPRESSION: Mild degenerative changes as described above.  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  Small interhemispheric cyst is again seen.  The lateral ventricles appear slitlike without hydrocephalus identified  Right parietal cynthia hole and shunt catheter is again seen and unchanged   in position.  Encephalomalacia and gliosis involving the right posterior   temporal/occipital region is again seen and unchanged  Small area of gliosis involving the left frontal subcortical region is   identified which could be compatible with gliosis secondary to prior   shunt catheter. Please correlate clinically.  Evaluation of the brain parenchyma appears unchanged.  Small polyp versus retention seen involving posterior right ethmoid sinus  Both mastoid and middle ear regions appear clear.  IMPRESSION: Overall no significant change when allowing for differences   in technique.  < end of copied text >    < from: CT Angio Neck w/ IV Cont (11.26.23 @ 19:31) >  IMPRESSION:  CTA BRAIN AND NECK: Unremarkable. No arterial steno-occlusive disease.  CT PERFUSION: No perfusion deficit per parameters above. Mild Tmax   (between 4-6 seconds) corresponds to posterior cerebral sites of known   congential anomaly.  < end of copied text >    < from: CT Head No Cont (11.26.23 @ 17:24) >  IMPRESSION:  No change compared to the 09/20/2023.  < end of copied text >    A/P  Ms. Villalobos is a 34 yo woman with a h/o hydrocephalus with  Shunt with a h/o multiple episodes of unilateral paresthesias and weakness of uncertain etiology.  She has a normal neurological examination with variable effort on motor exam.  This episode is not consistent with stroke, clinically.   If she is able to be discharged home safely - can do MRI brain as outpatient.   Thank you

## 2023-11-26 NOTE — CONSULT NOTE ADULT - ASSESSMENT
SHANNA 7 AM on 11/26/2023  NIHSS 0  preMRS 3  Pt not IV tenecteplase candidate because outside of time window  Pt not thrombectomy candidate because no disabling deficit     Impression: New subjective L hemiparesis and numbness/tingling. Exam nonfocal, strength is 4+ to 5 diffusely, and sensation to vibration, LT, and pain intact diffusely. New weakness and numbness/tingling could be functional in origin, although given acute onset and pt's decreased functional status (at baseline walks with cane, now not walking), would be reasonable to r/o acute intracranial pathology    Recommendations: To be discussed with neurology attending SHANNA 7 AM on 11/26/2023  NIHSS 0  preMRS 3  Pt not IV tenecteplase candidate because outside of time window  Pt not thrombectomy candidate because no disabling deficit     Impression: New subjective L hemiparesis and numbness/tingling. Exam nonfocal, strength is 4+ to 5 diffusely, and sensation to vibration, LT, and pain intact diffusely. New weakness and numbness/tingling could be functional in origin, although given acute onset and pt's decreased functional status (at baseline walks with cane, now not walking), would be reasonable to r/o acute intracranial pathology    Recommendations:   [ ] Given low suspicion for stroke, no need for secondary stroke prevention at this time. Can reassess after MRI  [ ] MRI Brain w/wo IV contrast to evaluate for infarction or other acute intracranial pathology that could explain patient's symptoms  [ ] PT/OT eval  [ ] Rest of care per primary team    Case discussed with neurology attending Dr. Holbrook

## 2023-11-26 NOTE — ED PROVIDER NOTE - ATTENDING CONTRIBUTION TO CARE
Dr. Escudero, Attending Physician-  I performed a face to face bedside interview with patient regarding history of present illness, review of symptoms and past medical history. I completed an independent physical exam.  I have discussed patient's plan of care with the resident.    33F, hx of hydrocephalus s/p  shunt, neuropathy who presents with LUE/LLE weakness. Reports that at baseline, she has RLE/RUE weakness. However since this morning has had difficulty ambulating secondary to her L sided symptoms. No fall. No blood thinners. Has had prior MRIs and NSGY evaluation without a clear etiology of her neurologic sxs. Physical: afebrile, non-toxic appearing, rrr, ctabl, abdomen soft, unable to lift LE or UE against gravity. Plan: labs, imaging to assess shunt, neurology c/s, anticipate admission.

## 2023-11-26 NOTE — ED PROVIDER NOTE - PHYSICAL EXAMINATION
GENERAL: Awake, alert, NAD  LUNGS: CTAB, no wheezes or crackles   CARDIAC: RRR, no m/r/g  ABDOMEN: Soft, non tender, non distended, no rebound, no guarding  BACK: No midline spinal tenderness, no CVA tenderness  EXT: No edema, no calf tenderness, 2+ DP pulses bilaterally, no deformities.  NEURO: A&Ox3. decreased sensation in upper and lower extremities decreased strength cannot raise up against gravity   SKIN: Warm and dry. No rash.  PSYCH: Normal affect.

## 2023-11-26 NOTE — ED ADULT NURSE NOTE - NSFALLRISKINTERV_ED_ALL_ED

## 2023-11-26 NOTE — ED ADULT TRIAGE NOTE - CHIEF COMPLAINT QUOTE
Pt arrives in wheelchair to triage c/o "neuropathy pain to my entire body" x1 day. States her pharmacy lost her neuropathy medication. PMHx: craniosynostosis, neuropathy, obstructive hydrocephalus,  shunt.

## 2023-11-26 NOTE — CONSULT NOTE ADULT - SUBJECTIVE AND OBJECTIVE BOX
Neurology - Consult Note    -  Spectra: [id number] ([facility]), [id number] ([facility])  -    HPI: 33F pmhx hydrocephalus with  Shunt, baseline R hemiparesis and numbness/tingling (etiology unknown, follows outpatient NSGY, neuro, and pain management, walks with cane at baseline), and vertigo at baseline (etiology unknown) presents with new L weakness and numbness/tingling. Pt noticed acute onset of this around 7 AM (pt woke up without the new L sided symptoms).  Of note, pt had a similar episode of L sided symptoms 1 month ago (workup unrevealing, symptoms resolved), although this time they are noticeably worse compared to last time. Because of these symptoms, pt is currently unable to walk. Pt denies any bowel/bladder incontinence, tongue biting, or episodes of LoC. Pt endorses a chronic holocephalic headache, but no new change in headache quality. Pt follows outpatient neurologist Dr. Bowers. Cannabis generally helps with patient's numbness/tingling and pain, both in her head and her R-side. No visual changes, no speech changes.       Allergies:  No Known Allergies      PMHx/PSHx/Family Hx: As above, otherwise see below   Personal History of Hydrocephalus (ICD9 V12.49)    Strabismus    Obstructive hydrocephalus    Meniere disease    Craniosynostosis    Vertigo    Neuropathy        Social Hx:  No current use of tobacco, alcohol, or illicit drugs  Lives with ***    Medications:  MEDICATIONS  (STANDING):    MEDICATIONS  (PRN):      Vitals:  T(C): 37.3 (11-26-23 @ 15:40), Max: 37.3 (11-26-23 @ 15:40)  HR: 108 (11-26-23 @ 15:40) (108 - 108)  BP: 136/86 (11-26-23 @ 15:40) (136/86 - 136/86)  RR: 19 (11-26-23 @ 15:40) (19 - 19)  SpO2: 100% (11-26-23 @ 15:40) (100% - 100%)    Physical Examination:  General - NAD  Cardiovascular -  no edema  Eyes - Fundoscopy not well visualized    Neurologic Exam:  Mental status - Awake, Alert, Oriented to person, place, and time. Speech fluent, repetition and naming intact. Follows simple and complex commands. Attention/concentration, recent and remote memory (including registration and recall), and fund of knowledge intact    Cranial nerves - PERRLA, VFF, EOMI, V1-V3 symmetric bilaterally, face symmetric, hearing intact b/l, palate with symmetric elevation, trapezius OR sternocleidomastoid 5/5 strength b/l, tongue midline on protrusion with full lateral movement    Motor - Normal bulk and tone throughout. No pronator drift.    Strength testing            Deltoid      Biceps      Triceps     Wrist Extension    Wrist Flexion     Interossei         R            5                 5               5                     5                     4+                4+              4+  L             5                 5               5                     5                   4+                  4+              4+              Hip Flexion    Hip Extension    Knee Flexion    Knee Extension    Dorsiflexion    Plantar Flexion  R              4+                  5                 5                         5                            4+                          5  L              4+                    5                5                         5                            4+                         5    Sensation - Light touch, pain, and vibration intact throughout. However, when light touch applied, pt endorses increase in tingling sensation     DTR's -             Biceps      Triceps     Brachioradialis      Patellar    Ankle    Toes/plantar response  R            3+             2+                 2+                 2+          3+                Mute  L            3+             2+                 2+                 2+           3+               Mute    Coordination - No dysmetria on FTN b/l    Gait and station - Pt deferred     Labs:                        13.4   12.71 )-----------( 370      ( 26 Nov 2023 16:20 )             40.8     11-26    141  |  106  |  13  ----------------------------<  99  4.4   |  24  |  0.86    Ca    9.2      26 Nov 2023 16:20  Phos  2.2     11-26  Mg     2.30     11-26    TPro  7.6  /  Alb  4.4  /  TBili  <0.2  /  DBili  x   /  AST  12  /  ALT  21  /  AlkPhos  112  11-26    CAPILLARY BLOOD GLUCOSE        LIVER FUNCTIONS - ( 26 Nov 2023 16:20 )  Alb: 4.4 g/dL / Pro: 7.6 g/dL / ALK PHOS: 112 U/L / ALT: 21 U/L / AST: 12 U/L / GGT: x               CSF:                  Radiology:  CT Head No Cont:  (26 Nov 2023 17:24)

## 2023-11-27 DIAGNOSIS — D72.829 ELEVATED WHITE BLOOD CELL COUNT, UNSPECIFIED: ICD-10-CM

## 2023-11-27 DIAGNOSIS — E83.39 OTHER DISORDERS OF PHOSPHORUS METABOLISM: ICD-10-CM

## 2023-11-27 DIAGNOSIS — Z29.9 ENCOUNTER FOR PROPHYLACTIC MEASURES, UNSPECIFIED: ICD-10-CM

## 2023-11-27 DIAGNOSIS — R53.1 WEAKNESS: ICD-10-CM

## 2023-11-27 DIAGNOSIS — Z79.899 OTHER LONG TERM (CURRENT) DRUG THERAPY: ICD-10-CM

## 2023-11-27 DIAGNOSIS — R26.9 UNSPECIFIED ABNORMALITIES OF GAIT AND MOBILITY: ICD-10-CM

## 2023-11-27 DIAGNOSIS — R82.71 BACTERIURIA: ICD-10-CM

## 2023-11-27 DIAGNOSIS — E86.0 DEHYDRATION: ICD-10-CM

## 2023-11-27 LAB
ANION GAP SERPL CALC-SCNC: 12 MMOL/L — SIGNIFICANT CHANGE UP (ref 7–14)
ANION GAP SERPL CALC-SCNC: 12 MMOL/L — SIGNIFICANT CHANGE UP (ref 7–14)
APPEARANCE UR: CLEAR — SIGNIFICANT CHANGE UP
APPEARANCE UR: CLEAR — SIGNIFICANT CHANGE UP
BACTERIA # UR AUTO: ABNORMAL /HPF
BACTERIA # UR AUTO: ABNORMAL /HPF
BASOPHILS # BLD AUTO: 0.01 K/UL — SIGNIFICANT CHANGE UP (ref 0–0.2)
BASOPHILS # BLD AUTO: 0.01 K/UL — SIGNIFICANT CHANGE UP (ref 0–0.2)
BASOPHILS NFR BLD AUTO: 0.1 % — SIGNIFICANT CHANGE UP (ref 0–2)
BASOPHILS NFR BLD AUTO: 0.1 % — SIGNIFICANT CHANGE UP (ref 0–2)
BILIRUB UR-MCNC: NEGATIVE — SIGNIFICANT CHANGE UP
BILIRUB UR-MCNC: NEGATIVE — SIGNIFICANT CHANGE UP
BUN SERPL-MCNC: 8 MG/DL — SIGNIFICANT CHANGE UP (ref 7–23)
BUN SERPL-MCNC: 8 MG/DL — SIGNIFICANT CHANGE UP (ref 7–23)
CALCIUM SERPL-MCNC: 9.2 MG/DL — SIGNIFICANT CHANGE UP (ref 8.4–10.5)
CALCIUM SERPL-MCNC: 9.2 MG/DL — SIGNIFICANT CHANGE UP (ref 8.4–10.5)
CAST: 1 /LPF — SIGNIFICANT CHANGE UP (ref 0–4)
CAST: 1 /LPF — SIGNIFICANT CHANGE UP (ref 0–4)
CHLORIDE SERPL-SCNC: 104 MMOL/L — SIGNIFICANT CHANGE UP (ref 98–107)
CHLORIDE SERPL-SCNC: 104 MMOL/L — SIGNIFICANT CHANGE UP (ref 98–107)
CO2 SERPL-SCNC: 23 MMOL/L — SIGNIFICANT CHANGE UP (ref 22–31)
CO2 SERPL-SCNC: 23 MMOL/L — SIGNIFICANT CHANGE UP (ref 22–31)
COLOR SPEC: YELLOW — SIGNIFICANT CHANGE UP
COLOR SPEC: YELLOW — SIGNIFICANT CHANGE UP
CREAT SERPL-MCNC: 0.76 MG/DL — SIGNIFICANT CHANGE UP (ref 0.5–1.3)
CREAT SERPL-MCNC: 0.76 MG/DL — SIGNIFICANT CHANGE UP (ref 0.5–1.3)
DIFF PNL FLD: NEGATIVE — SIGNIFICANT CHANGE UP
DIFF PNL FLD: NEGATIVE — SIGNIFICANT CHANGE UP
EGFR: 105 ML/MIN/1.73M2 — SIGNIFICANT CHANGE UP
EGFR: 105 ML/MIN/1.73M2 — SIGNIFICANT CHANGE UP
EOSINOPHIL # BLD AUTO: 0.02 K/UL — SIGNIFICANT CHANGE UP (ref 0–0.5)
EOSINOPHIL # BLD AUTO: 0.02 K/UL — SIGNIFICANT CHANGE UP (ref 0–0.5)
EOSINOPHIL NFR BLD AUTO: 0.2 % — SIGNIFICANT CHANGE UP (ref 0–6)
EOSINOPHIL NFR BLD AUTO: 0.2 % — SIGNIFICANT CHANGE UP (ref 0–6)
FERRITIN SERPL-MCNC: 148 NG/ML — SIGNIFICANT CHANGE UP (ref 15–150)
FERRITIN SERPL-MCNC: 148 NG/ML — SIGNIFICANT CHANGE UP (ref 15–150)
FERRITIN SERPL-MCNC: 157 NG/ML — HIGH (ref 15–150)
FERRITIN SERPL-MCNC: 157 NG/ML — HIGH (ref 15–150)
GLUCOSE SERPL-MCNC: 92 MG/DL — SIGNIFICANT CHANGE UP (ref 70–99)
GLUCOSE SERPL-MCNC: 92 MG/DL — SIGNIFICANT CHANGE UP (ref 70–99)
GLUCOSE UR QL: NEGATIVE MG/DL — SIGNIFICANT CHANGE UP
GLUCOSE UR QL: NEGATIVE MG/DL — SIGNIFICANT CHANGE UP
HCT VFR BLD CALC: 40.6 % — SIGNIFICANT CHANGE UP (ref 34.5–45)
HCT VFR BLD CALC: 40.6 % — SIGNIFICANT CHANGE UP (ref 34.5–45)
HGB BLD-MCNC: 12.7 G/DL — SIGNIFICANT CHANGE UP (ref 11.5–15.5)
HGB BLD-MCNC: 12.7 G/DL — SIGNIFICANT CHANGE UP (ref 11.5–15.5)
IANC: 5.32 K/UL — SIGNIFICANT CHANGE UP (ref 1.8–7.4)
IANC: 5.32 K/UL — SIGNIFICANT CHANGE UP (ref 1.8–7.4)
IMM GRANULOCYTES NFR BLD AUTO: 0.2 % — SIGNIFICANT CHANGE UP (ref 0–0.9)
IMM GRANULOCYTES NFR BLD AUTO: 0.2 % — SIGNIFICANT CHANGE UP (ref 0–0.9)
IRON SATN MFR SERPL: 11 % — LOW (ref 14–50)
IRON SATN MFR SERPL: 11 % — LOW (ref 14–50)
IRON SATN MFR SERPL: 33 UG/DL — SIGNIFICANT CHANGE UP (ref 30–160)
IRON SATN MFR SERPL: 33 UG/DL — SIGNIFICANT CHANGE UP (ref 30–160)
IRON SATN MFR SERPL: 34 % — SIGNIFICANT CHANGE UP (ref 14–50)
IRON SATN MFR SERPL: 34 % — SIGNIFICANT CHANGE UP (ref 14–50)
IRON SATN MFR SERPL: 88 UG/DL — SIGNIFICANT CHANGE UP (ref 30–160)
IRON SATN MFR SERPL: 88 UG/DL — SIGNIFICANT CHANGE UP (ref 30–160)
KETONES UR-MCNC: NEGATIVE MG/DL — SIGNIFICANT CHANGE UP
KETONES UR-MCNC: NEGATIVE MG/DL — SIGNIFICANT CHANGE UP
LACTATE SERPL-SCNC: 1 MMOL/L — SIGNIFICANT CHANGE UP (ref 0.5–2)
LACTATE SERPL-SCNC: 1 MMOL/L — SIGNIFICANT CHANGE UP (ref 0.5–2)
LEUKOCYTE ESTERASE UR-ACNC: NEGATIVE — SIGNIFICANT CHANGE UP
LEUKOCYTE ESTERASE UR-ACNC: NEGATIVE — SIGNIFICANT CHANGE UP
LYMPHOCYTES # BLD AUTO: 2.84 K/UL — SIGNIFICANT CHANGE UP (ref 1–3.3)
LYMPHOCYTES # BLD AUTO: 2.84 K/UL — SIGNIFICANT CHANGE UP (ref 1–3.3)
LYMPHOCYTES # BLD AUTO: 32.8 % — SIGNIFICANT CHANGE UP (ref 13–44)
LYMPHOCYTES # BLD AUTO: 32.8 % — SIGNIFICANT CHANGE UP (ref 13–44)
MAGNESIUM SERPL-MCNC: 2.2 MG/DL — SIGNIFICANT CHANGE UP (ref 1.6–2.6)
MAGNESIUM SERPL-MCNC: 2.2 MG/DL — SIGNIFICANT CHANGE UP (ref 1.6–2.6)
MCHC RBC-ENTMCNC: 22.8 PG — LOW (ref 27–34)
MCHC RBC-ENTMCNC: 22.8 PG — LOW (ref 27–34)
MCHC RBC-ENTMCNC: 31.3 GM/DL — LOW (ref 32–36)
MCHC RBC-ENTMCNC: 31.3 GM/DL — LOW (ref 32–36)
MCV RBC AUTO: 73 FL — LOW (ref 80–100)
MCV RBC AUTO: 73 FL — LOW (ref 80–100)
MONOCYTES # BLD AUTO: 0.46 K/UL — SIGNIFICANT CHANGE UP (ref 0–0.9)
MONOCYTES # BLD AUTO: 0.46 K/UL — SIGNIFICANT CHANGE UP (ref 0–0.9)
MONOCYTES NFR BLD AUTO: 5.3 % — SIGNIFICANT CHANGE UP (ref 2–14)
MONOCYTES NFR BLD AUTO: 5.3 % — SIGNIFICANT CHANGE UP (ref 2–14)
NEUTROPHILS # BLD AUTO: 5.32 K/UL — SIGNIFICANT CHANGE UP (ref 1.8–7.4)
NEUTROPHILS # BLD AUTO: 5.32 K/UL — SIGNIFICANT CHANGE UP (ref 1.8–7.4)
NEUTROPHILS NFR BLD AUTO: 61.4 % — SIGNIFICANT CHANGE UP (ref 43–77)
NEUTROPHILS NFR BLD AUTO: 61.4 % — SIGNIFICANT CHANGE UP (ref 43–77)
NITRITE UR-MCNC: NEGATIVE — SIGNIFICANT CHANGE UP
NITRITE UR-MCNC: NEGATIVE — SIGNIFICANT CHANGE UP
NRBC # BLD: 0 /100 WBCS — SIGNIFICANT CHANGE UP (ref 0–0)
NRBC # BLD: 0 /100 WBCS — SIGNIFICANT CHANGE UP (ref 0–0)
NRBC # FLD: 0 K/UL — SIGNIFICANT CHANGE UP (ref 0–0)
NRBC # FLD: 0 K/UL — SIGNIFICANT CHANGE UP (ref 0–0)
PH UR: 8 — SIGNIFICANT CHANGE UP (ref 5–8)
PH UR: 8 — SIGNIFICANT CHANGE UP (ref 5–8)
PHOSPHATE SERPL-MCNC: 2.7 MG/DL — SIGNIFICANT CHANGE UP (ref 2.5–4.5)
PHOSPHATE SERPL-MCNC: 2.7 MG/DL — SIGNIFICANT CHANGE UP (ref 2.5–4.5)
PLATELET # BLD AUTO: 354 K/UL — SIGNIFICANT CHANGE UP (ref 150–400)
PLATELET # BLD AUTO: 354 K/UL — SIGNIFICANT CHANGE UP (ref 150–400)
POTASSIUM SERPL-MCNC: 3.5 MMOL/L — SIGNIFICANT CHANGE UP (ref 3.5–5.3)
POTASSIUM SERPL-MCNC: 3.5 MMOL/L — SIGNIFICANT CHANGE UP (ref 3.5–5.3)
POTASSIUM SERPL-SCNC: 3.5 MMOL/L — SIGNIFICANT CHANGE UP (ref 3.5–5.3)
POTASSIUM SERPL-SCNC: 3.5 MMOL/L — SIGNIFICANT CHANGE UP (ref 3.5–5.3)
PROCALCITONIN SERPL-MCNC: 0.03 NG/ML — SIGNIFICANT CHANGE UP (ref 0.02–0.1)
PROCALCITONIN SERPL-MCNC: 0.03 NG/ML — SIGNIFICANT CHANGE UP (ref 0.02–0.1)
PROT UR-MCNC: NEGATIVE MG/DL — SIGNIFICANT CHANGE UP
PROT UR-MCNC: NEGATIVE MG/DL — SIGNIFICANT CHANGE UP
RBC # BLD: 5.56 M/UL — HIGH (ref 3.8–5.2)
RBC # FLD: 13.9 % — SIGNIFICANT CHANGE UP (ref 10.3–14.5)
RBC # FLD: 13.9 % — SIGNIFICANT CHANGE UP (ref 10.3–14.5)
RBC CASTS # UR COMP ASSIST: 1 /HPF — SIGNIFICANT CHANGE UP (ref 0–4)
RBC CASTS # UR COMP ASSIST: 1 /HPF — SIGNIFICANT CHANGE UP (ref 0–4)
RETICS #: 73.9 K/UL — SIGNIFICANT CHANGE UP (ref 25–125)
RETICS #: 73.9 K/UL — SIGNIFICANT CHANGE UP (ref 25–125)
RETICS #: 86.4 K/UL — SIGNIFICANT CHANGE UP (ref 25–125)
RETICS #: 86.4 K/UL — SIGNIFICANT CHANGE UP (ref 25–125)
RETICS/RBC NFR: 1.3 % — SIGNIFICANT CHANGE UP (ref 0.5–2.5)
RETICS/RBC NFR: 1.3 % — SIGNIFICANT CHANGE UP (ref 0.5–2.5)
RETICS/RBC NFR: 1.5 % — SIGNIFICANT CHANGE UP (ref 0.5–2.5)
RETICS/RBC NFR: 1.5 % — SIGNIFICANT CHANGE UP (ref 0.5–2.5)
SODIUM SERPL-SCNC: 139 MMOL/L — SIGNIFICANT CHANGE UP (ref 135–145)
SODIUM SERPL-SCNC: 139 MMOL/L — SIGNIFICANT CHANGE UP (ref 135–145)
SP GR SPEC: 1.01 — SIGNIFICANT CHANGE UP (ref 1–1.03)
SP GR SPEC: 1.01 — SIGNIFICANT CHANGE UP (ref 1–1.03)
SQUAMOUS # UR AUTO: 6 /HPF — HIGH (ref 0–5)
SQUAMOUS # UR AUTO: 6 /HPF — HIGH (ref 0–5)
TIBC SERPL-MCNC: 260 UG/DL — SIGNIFICANT CHANGE UP (ref 220–430)
TIBC SERPL-MCNC: 260 UG/DL — SIGNIFICANT CHANGE UP (ref 220–430)
TIBC SERPL-MCNC: 297 UG/DL — SIGNIFICANT CHANGE UP (ref 220–430)
TIBC SERPL-MCNC: 297 UG/DL — SIGNIFICANT CHANGE UP (ref 220–430)
TSH SERPL-MCNC: 0.86 UIU/ML — SIGNIFICANT CHANGE UP (ref 0.27–4.2)
TSH SERPL-MCNC: 0.86 UIU/ML — SIGNIFICANT CHANGE UP (ref 0.27–4.2)
UIBC SERPL-MCNC: 172 UG/DL — SIGNIFICANT CHANGE UP (ref 110–370)
UIBC SERPL-MCNC: 172 UG/DL — SIGNIFICANT CHANGE UP (ref 110–370)
UIBC SERPL-MCNC: 264 UG/DL — SIGNIFICANT CHANGE UP (ref 110–370)
UIBC SERPL-MCNC: 264 UG/DL — SIGNIFICANT CHANGE UP (ref 110–370)
UROBILINOGEN FLD QL: 0.2 MG/DL — SIGNIFICANT CHANGE UP (ref 0.2–1)
UROBILINOGEN FLD QL: 0.2 MG/DL — SIGNIFICANT CHANGE UP (ref 0.2–1)
WBC # BLD: 8.67 K/UL — SIGNIFICANT CHANGE UP (ref 3.8–10.5)
WBC # BLD: 8.67 K/UL — SIGNIFICANT CHANGE UP (ref 3.8–10.5)
WBC # FLD AUTO: 8.67 K/UL — SIGNIFICANT CHANGE UP (ref 3.8–10.5)
WBC # FLD AUTO: 8.67 K/UL — SIGNIFICANT CHANGE UP (ref 3.8–10.5)
WBC UR QL: 1 /HPF — SIGNIFICANT CHANGE UP (ref 0–5)
WBC UR QL: 1 /HPF — SIGNIFICANT CHANGE UP (ref 0–5)

## 2023-11-27 PROCEDURE — 93010 ELECTROCARDIOGRAM REPORT: CPT

## 2023-11-27 PROCEDURE — 99223 1ST HOSP IP/OBS HIGH 75: CPT

## 2023-11-27 PROCEDURE — 99253 IP/OBS CNSLTJ NEW/EST LOW 45: CPT

## 2023-11-27 RX ORDER — ONDANSETRON 8 MG/1
4 TABLET, FILM COATED ORAL ONCE
Refills: 0 | Status: COMPLETED | OUTPATIENT
Start: 2023-11-27 | End: 2023-11-27

## 2023-11-27 RX ORDER — TOPIRAMATE 25 MG
100 TABLET ORAL AT BEDTIME
Refills: 0 | Status: DISCONTINUED | OUTPATIENT
Start: 2023-11-27 | End: 2023-11-28

## 2023-11-27 RX ORDER — CYCLOBENZAPRINE HYDROCHLORIDE 10 MG/1
10 TABLET, FILM COATED ORAL THREE TIMES A DAY
Refills: 0 | Status: DISCONTINUED | OUTPATIENT
Start: 2023-11-27 | End: 2023-11-28

## 2023-11-27 RX ORDER — FAMOTIDINE 10 MG/ML
1 INJECTION INTRAVENOUS
Refills: 0 | DISCHARGE

## 2023-11-27 RX ORDER — POTASSIUM PHOSPHATE, MONOBASIC POTASSIUM PHOSPHATE, DIBASIC 236; 224 MG/ML; MG/ML
15 INJECTION, SOLUTION INTRAVENOUS ONCE
Refills: 0 | Status: COMPLETED | OUTPATIENT
Start: 2023-11-27 | End: 2023-11-27

## 2023-11-27 RX ORDER — INFLUENZA VIRUS VACCINE 15; 15; 15; 15 UG/.5ML; UG/.5ML; UG/.5ML; UG/.5ML
0.5 SUSPENSION INTRAMUSCULAR ONCE
Refills: 0 | Status: DISCONTINUED | OUTPATIENT
Start: 2023-11-27 | End: 2023-11-28

## 2023-11-27 RX ORDER — ENOXAPARIN SODIUM 100 MG/ML
40 INJECTION SUBCUTANEOUS EVERY 24 HOURS
Refills: 0 | Status: DISCONTINUED | OUTPATIENT
Start: 2023-11-27 | End: 2023-11-28

## 2023-11-27 RX ORDER — ESCITALOPRAM OXALATE 10 MG/1
20 TABLET, FILM COATED ORAL DAILY
Refills: 0 | Status: DISCONTINUED | OUTPATIENT
Start: 2023-11-27 | End: 2023-11-28

## 2023-11-27 RX ORDER — ESCITALOPRAM OXALATE 10 MG/1
1 TABLET, FILM COATED ORAL
Refills: 0 | DISCHARGE

## 2023-11-27 RX ORDER — HEPARIN SODIUM 5000 [USP'U]/ML
5000 INJECTION INTRAVENOUS; SUBCUTANEOUS EVERY 12 HOURS
Refills: 0 | Status: DISCONTINUED | OUTPATIENT
Start: 2023-11-27 | End: 2023-11-27

## 2023-11-27 RX ADMIN — ONDANSETRON 4 MILLIGRAM(S): 8 TABLET, FILM COATED ORAL at 14:05

## 2023-11-27 RX ADMIN — HEPARIN SODIUM 5000 UNIT(S): 5000 INJECTION INTRAVENOUS; SUBCUTANEOUS at 09:34

## 2023-11-27 RX ADMIN — CYCLOBENZAPRINE HYDROCHLORIDE 10 MILLIGRAM(S): 10 TABLET, FILM COATED ORAL at 18:37

## 2023-11-27 RX ADMIN — Medication 1 DROP(S): at 23:38

## 2023-11-27 RX ADMIN — POTASSIUM PHOSPHATE, MONOBASIC POTASSIUM PHOSPHATE, DIBASIC 62.5 MILLIMOLE(S): 236; 224 INJECTION, SOLUTION INTRAVENOUS at 09:34

## 2023-11-27 RX ADMIN — ESCITALOPRAM OXALATE 20 MILLIGRAM(S): 10 TABLET, FILM COATED ORAL at 18:37

## 2023-11-27 RX ADMIN — SODIUM CHLORIDE 125 MILLILITER(S): 9 INJECTION, SOLUTION INTRAVENOUS at 00:36

## 2023-11-27 RX ADMIN — ENOXAPARIN SODIUM 40 MILLIGRAM(S): 100 INJECTION SUBCUTANEOUS at 19:31

## 2023-11-27 NOTE — H&P ADULT - ASSESSMENT
PATIENTS ONCOLOGY RECORD LOCATED IN [facility]      Subjective     Name:  JEFFREY MCGOVERN     Date:  2018  Address:  [facility] [address] [place] [facility]  Home: [unfilled]  :  1946 AGE:  72 y.o.        RECORDS OBTAINED:  Patients Oncology Record is located in [facility]   REVIEW OF SYSTEMS:    CONSTITUTIONAL: No weakness, fever, chills or sweating  EYES/ENT: No visual changes.  No dysphagia  NECK: No pain or stiffness  RESPIRATORY: No cough or hemoptysis.  No shortness of breath  CARDIOVASCULAR: No chest pain or palpitations.  No lower extremity edema  GASTROINTESTINAL: No epigastric or abdominal pain. No nausea, vomiting or hematemesis.  No diarrhea or constipation. No melena or hematochezia.  GENITOURINARY: No dysuria, frequency or hematuria  MUSCULOSKELETAL: No joint pain, swelling, decreased ROM, erythema, warmth  NEUROLOGICAL: No numbness or weakness  PSYCHIATRY: No anxiety, or depression.  SKIN: No itching, burning, rashes, or lesions   All other review of systems is negative unless indicated above. [ x ]  Lab studies personally reviewed  [ x ]  Radiology personally reviewed  [ x ]  Old records personally reviewed    33 year old  female, with past history significant for Craniosynostosis, R-sided neuropathy, Obstructive hydrocephalus, Vertigo, Strabismus, Right sided weakness, and Gait difficulty (uses walker), presented to the ED secondary to weakness of the left upper and lower extremities.  Diagnosed with Weakness in the ED.

## 2023-11-27 NOTE — H&P ADULT - PROBLEM SELECTOR PLAN 1
- sudden onset in the AM with inability to move the LUE and LLE; has baseline weakness on the right - necessitating ambulation with a cane.  Now unable to ambulate  - unclear etiology, and not thought to be due to CVA.  Could be complicated by missed medication dosages ([facility] closed prescriptions transferred to [facility], but patient still unable to get all meds)  - seen by Neurology Team (appreciated); suggestion for "MRI Brain w/wo IV contrast to evaluate for infarction or other acute intracranial pathology that could explain patient's symptoms..."  - f/u MRI (ordered)  - fall precautions  - Physical Therapy eval  - f/u with Neurology Team for further recommendations

## 2023-11-27 NOTE — PATIENT PROFILE ADULT - FUNCTIONAL ASSESSMENT - DAILY ACTIVITY 6.
Impression: Other secondary cataract, bilateral: H26.493. Plan: Discussed diagnosis in detail with patient. Discussed treatment options with patient. Discussed risks and benefits and patient understands. Will continue to observe condition and or symptoms. Call if [address] worsens. Patient instructed to call if condition gets worse. 4 = No assist / stand by assistance

## 2023-11-27 NOTE — PHYSICAL THERAPY INITIAL EVALUATION ADULT - NSPTDISCHREC_GEN_A_CORE
to address gait and strength deficits. Patient also educated on vestibular therapy for vertigo./Outpatient PT

## 2023-11-27 NOTE — H&P ADULT - PROBLEM SELECTOR PLAN 2
- very dry oral mucosa, hemoconcentrated lab-work, increased urine specific gravity  - prescribing IVF of LR one liter bolus, followed by LR at 125 mL/Hr x 8 hours; eval need for additional IVF  - encourage oral hydration, as tolerated (may need assistance with same, due to weakness)  - f/u electrolytes, renal function

## 2023-11-27 NOTE — H&P ADULT - PROBLEM SELECTOR PLAN 3
- at baseline, ambulates with cane - due to right sided weakness  - now with acute onset of left sided weakness and inability to ambulate  - has regained some strength on the left  - Physical Therapy eval  - fall precautions

## 2023-11-27 NOTE — PROGRESS NOTE ADULT - PROBLEM SELECTOR PLAN 6
-DVT ppx: will change HSQ to lovenox -DVT ppx: will change HSQ to lovenox  f/u PT recommendations regarding dispo

## 2023-11-27 NOTE — OCCUPATIONAL THERAPY INITIAL EVALUATION ADULT - PLANNED THERAPY INTERVENTIONS, OT EVAL
ADL retraining/bed mobility training/parent/caregiver training.../ROM/strengthening/transfer training

## 2023-11-27 NOTE — H&P ADULT - NSICDXPASTMEDICALHX_GEN_ALL_CORE_FT
PAST MEDICAL HISTORY:  Craniosynostosis     Neuropathy R side of the body    Obstructive hydrocephalus  shunt placed at birth with multiple revisions for malfunction. LAST REVISION 02/2015    Personal History of Hydrocephalus (ICD9 V12.49)     Strabismus     Vertigo      PAST MEDICAL HISTORY:  Ambulates with cane     Craniosynostosis     History of depression     Neuropathy R side of the body    Obstructive hydrocephalus  shunt placed at birth with multiple revisions for malfunction. LAST REVISION 02/2015    Personal History of Hydrocephalus (ICD9 V12.49)     Strabismus     Vertigo

## 2023-11-27 NOTE — PROGRESS NOTE ADULT - PROBLEM SELECTOR PLAN 4
- WBC = 12.71 yesterday, f/u repeat level  - patient otherwise hemodynamically stable, afebrile  - monitor CBC - WBC = 12.7->8.6, resolved  - patient otherwise hemodynamically stable, afebrile  - monitor CBC

## 2023-11-27 NOTE — OCCUPATIONAL THERAPY INITIAL EVALUATION ADULT - GENERAL OBSERVATIONS, REHAB EVAL
Upon entry, patient semi-supine in bed, agreeable to OT eval. Patient left seated at edge of bed to eat, call bell in reach, all lines/tubes intact +IV, +primafit, vitals stable. Vitals: /102 mmHg.

## 2023-11-27 NOTE — OCCUPATIONAL THERAPY INITIAL EVALUATION ADULT - ADDITIONAL COMMENTS
As per patient, typically is independent with ADLs, but sometimes needs assistance from her mother when she is experiencing flair ups with a lot of pain, mainly with childcare and home care taking. Uses a cane for ambulation.

## 2023-11-27 NOTE — PHYSICAL THERAPY INITIAL EVALUATION ADULT - PLANNED THERAPY INTERVENTIONS, PT EVAL
balance training/bed mobility training/gait training/motor coordination training/neuromuscular re-education/strengthening/stretching/transfer training

## 2023-11-27 NOTE — PHYSICAL THERAPY INITIAL EVALUATION ADULT - ADDITIONAL COMMENTS
Patient lives in a first floor apartment with young daughter,+3 steps to enter. Patient uses cane for ambulation and requires assistance for certain ADLs from mother.     Patient left sitting at edge of bed, eating, in no apparent distress, lines intact, +call bell.

## 2023-11-27 NOTE — H&P ADULT - NSHPLABSRESULTS_GEN_ALL_CORE
13.4   12 )-----------( 370      ( 2023 16:20 )             40.8     141  |  106  |  13  ----------------------------<  99       4.4   |  24  |  0.86    Ca    9.2      2023 16:20  Phos  2.2       Mg     2.30         TPro  7.6  /  Alb  4.4  /  TBili  <0.2  /  DBili  x   /  AST  12  /  ALT  21  /  AlkPhos  112          Urinalysis Basic - ( 2023 23:30 )  Color: Yellow / Appearance: Clear / S.090 / pH: 7.5  Gluc: Negative mg/dL / Ketone: Trace mg/dL  / Bili: Negative / Urobili: 0.2 mg/dL   Blood: Negative / Protein: Trace mg/dL / Nitrite: Negative   Leuk Esterase: Negative / RBC: 1 /HPF / WBC 1 /HPF   Sq Epi: x / Non Sq Epi: x / Bacteria: Moderate /HPF    ===================================================================        ===================================================================  .

## 2023-11-27 NOTE — PHYSICAL THERAPY INITIAL EVALUATION ADULT - PHYSICAL ASSIST/NONPHYSICAL ASSIST: GAIT, REHAB EVAL
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

## 2023-11-27 NOTE — OCCUPATIONAL THERAPY INITIAL EVALUATION ADULT - PERTINENT HX OF CURRENT PROBLEM, REHAB EVAL
Patient is a 33 year old female, Patient is a 33 year old female, presented to the ED secondary to weakness of the left upper and lower extremities. Patient reports suffering with chronic pain/discomfort - including neurological (neuropathic, tingling, numbness, cramping, ), and involves multiple areas of the body. Past history significant for Craniosynostosis, R-sided neuropathy, Obstructive hydrocephalus, -shunt, Vertigo, Strabismus, Right sided weakness, and Gait difficulty (uses walker),

## 2023-11-27 NOTE — H&P ADULT - PROBLEM SELECTOR PLAN 4
- phosphorus = 2.2  - could be contributing toward weakness; energy level  - K-phos prescribed  - f/u for improvement/resolution

## 2023-11-27 NOTE — H&P ADULT - NSHPREVIEWOFSYSTEMS_GEN_ALL_CORE
REVIEW OF SYSTEMS:    CONSTITUTIONAL: Generalized weakness with difficulty moving (LLE and LLE worse than the right).  No fever, chills or sweating  EYES/ENT: Poor vision at baseline - especially on the left.  No dysphagia  NECK: No pain or stiffness  RESPIRATORY: No cough or hemoptysis.  No shortness of breath  CARDIOVASCULAR: No chest pain or palpitations.  No lower extremity edema  GASTROINTESTINAL: No epigastric or abdominal pain. No nausea, vomiting or hematemesis.  No diarrhea or constipation. No melena or hematochezia.  GENITOURINARY: No dysuria, frequency or hematuria  MUSCULOSKELETAL: Inability to ambulate due to sudden onset of weakness of the LUE and LLE; chronic weakness and neuropathic discomfort of the RUE and RLE.  No erythema, warmth. Uses cane at baseline  NEUROLOGICAL: Sudden onset of weakness of the LUE and LLE; chronic weakness and neuropathic discomfort of the RUE and RLE.  Frequent sensations of numbness, tingling, cramping over different body areas  PSYCHIATRY: Occasional anxiety and depression - managed with medication and therapy  SKIN: No itching, burning, rashes, or lesions   All other review of systems is negative unless indicated above.

## 2023-11-27 NOTE — H&P ADULT - PROBLEM SELECTOR PLAN 6
- WBC = 12.71  - although UA with bacteria; no symptoms suggestive of UTI  - no other signs of possible infection appreciated presently  - elevated WBC could be due to stress demargination  - f/u for any signs of emerging infection

## 2023-11-27 NOTE — PATIENT PROFILE ADULT - FALL HARM RISK - HARM RISK INTERVENTIONS

## 2023-11-27 NOTE — H&P ADULT - PROBLEM SELECTOR PLAN 5
- with increased urine specific gravity  - being hydrated  - no antibiotic prescribed  - f/u for any emerging signs/symptoms

## 2023-11-27 NOTE — H&P ADULT - NSHPPHYSICALEXAM_GEN_ALL_CORE
Vital Signs Last 24 Hrs  T(C): 37 (26 Nov 2023 23:49), Max: 37.3 (26 Nov 2023 15:40)  T(F): 98.6 (26 Nov 2023 23:49), Max: 99.2 (26 Nov 2023 15:40)  HR: 87 (26 Nov 2023 23:49) (84 - 108)  BP: 136/97 (26 Nov 2023 23:49) (136/86 - 148/81)  BP(mean): --  RR: 18 (26 Nov 2023 23:49) (17 - 20)  SpO2: 98% (26 Nov 2023 23:49) (98% - 100%)    Parameters below as of 26 Nov 2023 23:49  Patient On (Oxygen Delivery Method): room air    =================================================== Vital Signs Last 24 Hrs  T(C): 37 (26 Nov 2023 23:49), Max: 37.3 (26 Nov 2023 15:40)  T(F): 98.6 (26 Nov 2023 23:49), Max: 99.2 (26 Nov 2023 15:40)  HR: 87 (26 Nov 2023 23:49) (84 - 108)  BP: 136/97 (26 Nov 2023 23:49) (136/86 - 148/81)  BP(mean): --  RR: 18 (26 Nov 2023 23:49) (17 - 20)  SpO2: 98% (26 Nov 2023 23:49) (98% - 100%)    Parameters below as of 26 Nov 2023 23:49  Patient On (Oxygen Delivery Method): room air    ===================================================    PHYSICAL EXAMINATION:    APPEARANCE: Adequately groomed, adequately nourished female, lying propped up in stretcher in NAD.  Coughing intermittently  HEENT: Very dry oral mucosa.  Pupils reactive to light.  EOMI  LYMPHATIC: No lymphadenopathy appreciated  CARDIOVASCULAR: (+) S1 S2.  No JVD.  No murmurs.  No edema  RESPIRATORY: No wheezing, rhonchi, crackles appreciated  GASTROINTESTINAL: Soft.  Non-tender.  (+) BS  GENITOURINARY: No suprapubic tenderness.  No CVA tenderness B/L  EXTREMITIES: Unequal strength of the upper extremities with LUE stronger than the right.  Able to push against resistance with the lower extremities.  No clubbing, cyanosis or edema  SKIN: No rashes. No ecchymoses.  No cyanosis  PSYCHIATRIC: A&O x 3.  Mood & affect appropriate to situation  NEUROLOGICAL: Non-focal, MCKENNA x 4 against gravity  VASCULAR: Peripheral pulses palpable

## 2023-11-27 NOTE — H&P ADULT - PROBLEM SELECTOR PLAN 7
- patient unable to recall medications/dosages  - Med-Hx Pharmacist e-mailed; please f/u and complete Med-Rec

## 2023-11-27 NOTE — PHARMACOTHERAPY INTERVENTION NOTE - COMMENTS
Medication history is complete. Medication list updated in Outpatient Medication Record (OMR) per patient and medical record. Patient used [facility], which closed. Scripts to be transferred to [facility], however  has no dispensing history for this patient. Please call spectra [phone] if you have any questions.  Medication history is complete. Medication list updated in Outpatient Medication Record (OMR) per patient and medical record. Patient used [facility], which closed. Scripts to be transferred to [facility], however  has no dispensing history for this patient. Please call spectra [phone] if you have any questions.   Spoke with ACP provider to notify OMR updated.

## 2023-11-27 NOTE — H&P ADULT - NSICDXFAMILYHX_GEN_ALL_CORE_FT
FAMILY HISTORY:  Mother  Still living? Unknown  Family history of hypertension, Age at diagnosis: Age Unknown  Family history of peptic ulcer, Age at diagnosis: Age Unknown    Grandparent  Still living? Unknown  Family history of malignant neoplasm of endometrium, Age at diagnosis: Age Unknown    Aunt  Still living? Unknown  Family history of colon cancer, Age at diagnosis: Age Unknown

## 2023-11-27 NOTE — PHYSICAL THERAPY INITIAL EVALUATION ADULT - PERTINENT HX OF CURRENT PROBLEM, REHAB EVAL
Patient is a 33 year old female, presented to the ED secondary to weakness of the left upper and lower extremities. Patient reports suffering with chronic pain/discomfort - including neurological (neuropathic, tingling, numbness, cramping), and involves multiple areas of the body. Past history significant for Craniosynostosis, R-sided neuropathy, Obstructive hydrocephalus, -shunt, Vertigo, Strabismus, Right sided weakness, and Gait difficulty.

## 2023-11-27 NOTE — PHYSICAL THERAPY INITIAL EVALUATION ADULT - GENERAL OBSERVATIONS, REHAB EVAL
Patient received semi-supine in no apparent distress, agreeable to participate in PT session, +IV. /102.

## 2023-11-27 NOTE — H&P ADULT - NSHPSOCIALHISTORY_GEN_ALL_CORE
SOCIAL HISTORY:    Marital Status:  (  )    ( x ) Single        (  )        (  )        (  )   Lives with:          (  ) Alone      (  ) Spouse     ( x ) Children         ( x ) Parents/Mother lives with patient             (  ) Other    No history of smoking  No history of alcohol abuse  No history of illegal drug use    Occupation: SOCIAL HISTORY:    Marital Status:  (  )    ( x ) Single        (  )        (  )        (  )   Lives with:          (  ) Alone      (  ) Spouse     ( x ) Children/Daughter         ( x ) Parents/Mother lives with patient             (  ) Other    No history of smoking  No history of alcohol abuse  No history of illegal drug use    Occupation:

## 2023-11-27 NOTE — H&P ADULT - HISTORY OF PRESENT ILLNESS
33 year old  female, with past history significant for Craniosynostosis, R-sided neuropathy, Obstructive hydrocephalus, Vertigo, Strabismus and Gait difficulty (uses walker), presented to the ED secondary to weakness of the left upper and lower extremities.  Seen and evaluated at bedside;    Vital signs upon ED presentation as follows: BP = 136/86, HR = 108, RR = 19, T = 37.3 C (99.2 F), O2 Sat = 100% on RA.  Diagnosed with Weakness and prescribed Tylenol 1 gram and Benadryl 50 mg PO x one in the ED. 33 year old  female, with past history significant for Craniosynostosis, R-sided neuropathy, Obstructive hydrocephalus, Vertigo, Strabismus and Gait difficulty (uses walker), presented to the ED secondary to weakness of the left upper and lower extremities.  Seen and evaluated at bedside with mother present; asleep, but wakens easily to name being called.  Patient reports suffering with chronic pain/discomfort - including neurological (neuropathic, tingling, numbness, cramping, ), and involves multiple areas of the body.  On the day of coming to the ED, patient woke up     Vital signs upon ED presentation as follows: BP = 136/86, HR = 108, RR = 19, T = 37.3 C (99.2 F), O2 Sat = 100% on RA.  Diagnosed with Weakness and prescribed Tylenol 1 gram and Benadryl 50 mg PO x one in the ED. 33 year old  female, with past history significant for Craniosynostosis, R-sided neuropathy, Obstructive hydrocephalus, Vertigo, Strabismus, Right sided weakness, and Gait difficulty (uses walker), presented to the ED secondary to weakness of the left upper and lower extremities.  Seen and evaluated at bedside with mother present; asleep, but wakens easily to name being called.  Patient reports suffering with chronic pain/discomfort - including neurological (neuropathic, tingling, numbness, cramping, ), and involves multiple areas of the body.  On the day of coming to the ED, patient woke up and had after completing some morning chores, went to lie down.  However, patient realized that she was unable to get up afterwards; had sudden onset of profound weakness of the LUE and LLE, numbness, tingling sensations at multiple areas of the body.  Trials of massages, application of heat/warm pads were ineffective.  Patient became unable to move the LUE and LLE.  Attempt was made to contact the OP Neurologist, but this was unsuccessful.  Per mother, patient then requested to be taken to the ED, which is out of character for her.    Mother commented that patient usually gets her medication from the [facility].  However, with its closure, patient's prescriptions were being transferred from there [facility].  However, despite multiple contacts with [facility], patient has not been able to get some of her medications, including Flexeril.  Consequently, she has missed doses of some medications.      Vital signs upon ED presentation as follows: BP = 136/86, HR = 108, RR = 19, T = 37.3 C (99.2 F), O2 Sat = 100% on RA.  Diagnosed with Weakness and prescribed Tylenol 1 gram and Benadryl 50 mg PO x one in the ED. 33 year old  female, with past history significant for Craniosynostosis, R-sided neuropathy, Obstructive hydrocephalus, -shunt, Vertigo, Strabismus, Right sided weakness, and Gait difficulty (uses walker), presented to the ED secondary to weakness of the left upper and lower extremities.  Seen and evaluated at bedside with mother present; asleep, but wakens easily to name being called.  Patient reports suffering with chronic pain/discomfort - including neurological (neuropathic, tingling, numbness, cramping, ), and involves multiple areas of the body.  On the day of coming to the ED, patient woke up and had after completing some early morning chores, went to lie down.  However, patient realized that she was unable to get up afterwards; had sudden onset of profound weakness of the LUE and LLE, numbness, tingling sensations at multiple areas of the body.  Onset at approximately 7:00 AM.  Trials of massages, application of heat/warm pads were ineffective.  Patient became unable to move the LUE and LLE.  Attempt was made to contact the OP Neurologist, but this was unsuccessful.  Per mother, patient then requested to be taken to the ED, which is out of character for her.    Mother commented that patient usually gets her medication from the [facility].  However, with its closure, patient's prescriptions were being transferred from there [facility].  However, despite multiple contacts with [facility], patient has not been able to get some of her medications, including Flexeril.  Consequently, she has missed doses of some medications.  Mother also states that even though radiological studies do not always reflect an abnormality relative to the VPS and patient's complaints, but during revisions, issues have been found at times.    Vital signs upon ED presentation as follows: BP = 136/86, HR = 108, RR = 19, T = 37.3 C (99.2 F), O2 Sat = 100% on RA.  Diagnosed with Weakness and prescribed Tylenol 1 gram and Benadryl 50 mg PO x one in the ED.

## 2023-11-27 NOTE — PROGRESS NOTE ADULT - PROBLEM SELECTOR PLAN 1
- sudden onset 11/26 with inability to move the LUE and LLE; has baseline weakness on the right  - unclear etiology, and not thought to be due to CVA. Consider in setting of missed doses of medication vs electrolyte abnormality  - appreciate neuro recommendations, f/u MRI brain  - appreciate PT eval, OT recommending outpatient OT  - maintain fall precautions - sudden onset 11/26 with inability to move the LUE and LLE; has baseline weakness on the right  - unclear etiology, and not thought to be due to CVA. Consider in setting of missed doses of medication vs electrolyte abnormality  - appreciate neuro recommendations, f/u MRI brain, per neuro can pursue as outpatient  - appreciate PT eval, OT recommending outpatient OT  - maintain fall precautions

## 2023-11-27 NOTE — PHYSICAL THERAPY INITIAL EVALUATION ADULT - NSPTDMEREC_GEN_A_CORE
Patient will require a rolling walker to be able to perform their MRDALs within their home and for fall prevention./rolling walker

## 2023-11-27 NOTE — PHYSICAL THERAPY INITIAL EVALUATION ADULT - IMPAIRMENTS FOUND, PT EVAL
gait, locomotion, and balance/gross motor/muscle strength/neuromotor development and sensory integration

## 2023-11-27 NOTE — PHYSICAL THERAPY INITIAL EVALUATION ADULT - GAIT DEVIATIONS NOTED, PT EVAL
decreased veronica/increased time in double stance/decreased velocity of limb motion/decreased step length/decreased stride length/decreased weight-shifting ability

## 2023-11-28 ENCOUNTER — TRANSCRIPTION ENCOUNTER (OUTPATIENT)
Age: 33
End: 2023-11-28

## 2023-11-28 VITALS
TEMPERATURE: 98 F | OXYGEN SATURATION: 100 % | RESPIRATION RATE: 18 BRPM | DIASTOLIC BLOOD PRESSURE: 70 MMHG | HEART RATE: 75 BPM | SYSTOLIC BLOOD PRESSURE: 108 MMHG

## 2023-11-28 DIAGNOSIS — E55.9 VITAMIN D DEFICIENCY, UNSPECIFIED: ICD-10-CM

## 2023-11-28 DIAGNOSIS — F32.9 MAJOR DEPRESSIVE DISORDER, SINGLE EPISODE, UNSPECIFIED: ICD-10-CM

## 2023-11-28 DIAGNOSIS — G62.9 POLYNEUROPATHY, UNSPECIFIED: ICD-10-CM

## 2023-11-28 LAB
24R-OH-CALCIDIOL SERPL-MCNC: 14.9 NG/ML — LOW (ref 30–80)
24R-OH-CALCIDIOL SERPL-MCNC: 14.9 NG/ML — LOW (ref 30–80)
24R-OH-CALCIDIOL SERPL-MCNC: 16.2 NG/ML — LOW (ref 30–80)
24R-OH-CALCIDIOL SERPL-MCNC: 16.2 NG/ML — LOW (ref 30–80)
ANION GAP SERPL CALC-SCNC: 10 MMOL/L — SIGNIFICANT CHANGE UP (ref 7–14)
ANION GAP SERPL CALC-SCNC: 10 MMOL/L — SIGNIFICANT CHANGE UP (ref 7–14)
BASOPHILS # BLD AUTO: 0.01 K/UL — SIGNIFICANT CHANGE UP (ref 0–0.2)
BASOPHILS # BLD AUTO: 0.01 K/UL — SIGNIFICANT CHANGE UP (ref 0–0.2)
BASOPHILS NFR BLD AUTO: 0.2 % — SIGNIFICANT CHANGE UP (ref 0–2)
BASOPHILS NFR BLD AUTO: 0.2 % — SIGNIFICANT CHANGE UP (ref 0–2)
BILIRUB SERPL-MCNC: 0.5 MG/DL — SIGNIFICANT CHANGE UP (ref 0.2–1.2)
BILIRUB SERPL-MCNC: 0.5 MG/DL — SIGNIFICANT CHANGE UP (ref 0.2–1.2)
BUN SERPL-MCNC: 8 MG/DL — SIGNIFICANT CHANGE UP (ref 7–23)
BUN SERPL-MCNC: 8 MG/DL — SIGNIFICANT CHANGE UP (ref 7–23)
CALCIUM SERPL-MCNC: 8.9 MG/DL — SIGNIFICANT CHANGE UP (ref 8.4–10.5)
CALCIUM SERPL-MCNC: 8.9 MG/DL — SIGNIFICANT CHANGE UP (ref 8.4–10.5)
CHLORIDE SERPL-SCNC: 102 MMOL/L — SIGNIFICANT CHANGE UP (ref 98–107)
CHLORIDE SERPL-SCNC: 102 MMOL/L — SIGNIFICANT CHANGE UP (ref 98–107)
CO2 SERPL-SCNC: 26 MMOL/L — SIGNIFICANT CHANGE UP (ref 22–31)
CO2 SERPL-SCNC: 26 MMOL/L — SIGNIFICANT CHANGE UP (ref 22–31)
CREAT SERPL-MCNC: 0.88 MG/DL — SIGNIFICANT CHANGE UP (ref 0.5–1.3)
CREAT SERPL-MCNC: 0.88 MG/DL — SIGNIFICANT CHANGE UP (ref 0.5–1.3)
EGFR: 88 ML/MIN/1.73M2 — SIGNIFICANT CHANGE UP
EGFR: 88 ML/MIN/1.73M2 — SIGNIFICANT CHANGE UP
EOSINOPHIL # BLD AUTO: 0.05 K/UL — SIGNIFICANT CHANGE UP (ref 0–0.5)
EOSINOPHIL # BLD AUTO: 0.05 K/UL — SIGNIFICANT CHANGE UP (ref 0–0.5)
EOSINOPHIL NFR BLD AUTO: 0.8 % — SIGNIFICANT CHANGE UP (ref 0–6)
EOSINOPHIL NFR BLD AUTO: 0.8 % — SIGNIFICANT CHANGE UP (ref 0–6)
GLUCOSE SERPL-MCNC: 81 MG/DL — SIGNIFICANT CHANGE UP (ref 70–99)
GLUCOSE SERPL-MCNC: 81 MG/DL — SIGNIFICANT CHANGE UP (ref 70–99)
HCT VFR BLD CALC: 41.3 % — SIGNIFICANT CHANGE UP (ref 34.5–45)
HCT VFR BLD CALC: 41.3 % — SIGNIFICANT CHANGE UP (ref 34.5–45)
HGB BLD-MCNC: 13.1 G/DL — SIGNIFICANT CHANGE UP (ref 11.5–15.5)
HGB BLD-MCNC: 13.1 G/DL — SIGNIFICANT CHANGE UP (ref 11.5–15.5)
IANC: 2.34 K/UL — SIGNIFICANT CHANGE UP (ref 1.8–7.4)
IANC: 2.34 K/UL — SIGNIFICANT CHANGE UP (ref 1.8–7.4)
IMM GRANULOCYTES NFR BLD AUTO: 0.2 % — SIGNIFICANT CHANGE UP (ref 0–0.9)
IMM GRANULOCYTES NFR BLD AUTO: 0.2 % — SIGNIFICANT CHANGE UP (ref 0–0.9)
INR BLD: 1.06 RATIO — SIGNIFICANT CHANGE UP (ref 0.85–1.18)
INR BLD: 1.06 RATIO — SIGNIFICANT CHANGE UP (ref 0.85–1.18)
LYMPHOCYTES # BLD AUTO: 3.48 K/UL — HIGH (ref 1–3.3)
LYMPHOCYTES # BLD AUTO: 3.48 K/UL — HIGH (ref 1–3.3)
LYMPHOCYTES # BLD AUTO: 55.7 % — HIGH (ref 13–44)
LYMPHOCYTES # BLD AUTO: 55.7 % — HIGH (ref 13–44)
MAGNESIUM SERPL-MCNC: 2.3 MG/DL — SIGNIFICANT CHANGE UP (ref 1.6–2.6)
MAGNESIUM SERPL-MCNC: 2.3 MG/DL — SIGNIFICANT CHANGE UP (ref 1.6–2.6)
MCHC RBC-ENTMCNC: 23.4 PG — LOW (ref 27–34)
MCHC RBC-ENTMCNC: 23.4 PG — LOW (ref 27–34)
MCHC RBC-ENTMCNC: 31.7 GM/DL — LOW (ref 32–36)
MCHC RBC-ENTMCNC: 31.7 GM/DL — LOW (ref 32–36)
MCV RBC AUTO: 73.8 FL — LOW (ref 80–100)
MCV RBC AUTO: 73.8 FL — LOW (ref 80–100)
MELD SCORE WITH DIALYSIS: 20 POINTS — SIGNIFICANT CHANGE UP
MELD SCORE WITH DIALYSIS: 20 POINTS — SIGNIFICANT CHANGE UP
MELD SCORE WITHOUT DIALYSIS: 7 POINTS — SIGNIFICANT CHANGE UP
MELD SCORE WITHOUT DIALYSIS: 7 POINTS — SIGNIFICANT CHANGE UP
MONOCYTES # BLD AUTO: 0.36 K/UL — SIGNIFICANT CHANGE UP (ref 0–0.9)
MONOCYTES # BLD AUTO: 0.36 K/UL — SIGNIFICANT CHANGE UP (ref 0–0.9)
MONOCYTES NFR BLD AUTO: 5.8 % — SIGNIFICANT CHANGE UP (ref 2–14)
MONOCYTES NFR BLD AUTO: 5.8 % — SIGNIFICANT CHANGE UP (ref 2–14)
NEUTROPHILS # BLD AUTO: 2.34 K/UL — SIGNIFICANT CHANGE UP (ref 1.8–7.4)
NEUTROPHILS # BLD AUTO: 2.34 K/UL — SIGNIFICANT CHANGE UP (ref 1.8–7.4)
NEUTROPHILS NFR BLD AUTO: 37.3 % — LOW (ref 43–77)
NEUTROPHILS NFR BLD AUTO: 37.3 % — LOW (ref 43–77)
NRBC # BLD: 0 /100 WBCS — SIGNIFICANT CHANGE UP (ref 0–0)
NRBC # BLD: 0 /100 WBCS — SIGNIFICANT CHANGE UP (ref 0–0)
NRBC # FLD: 0 K/UL — SIGNIFICANT CHANGE UP (ref 0–0)
NRBC # FLD: 0 K/UL — SIGNIFICANT CHANGE UP (ref 0–0)
PHOSPHATE SERPL-MCNC: 3.2 MG/DL — SIGNIFICANT CHANGE UP (ref 2.5–4.5)
PHOSPHATE SERPL-MCNC: 3.2 MG/DL — SIGNIFICANT CHANGE UP (ref 2.5–4.5)
PLATELET # BLD AUTO: 305 K/UL — SIGNIFICANT CHANGE UP (ref 150–400)
PLATELET # BLD AUTO: 305 K/UL — SIGNIFICANT CHANGE UP (ref 150–400)
POTASSIUM SERPL-MCNC: 3.7 MMOL/L — SIGNIFICANT CHANGE UP (ref 3.5–5.3)
POTASSIUM SERPL-MCNC: 3.7 MMOL/L — SIGNIFICANT CHANGE UP (ref 3.5–5.3)
POTASSIUM SERPL-SCNC: 3.7 MMOL/L — SIGNIFICANT CHANGE UP (ref 3.5–5.3)
POTASSIUM SERPL-SCNC: 3.7 MMOL/L — SIGNIFICANT CHANGE UP (ref 3.5–5.3)
PROTHROM AB SERPL-ACNC: 12 SEC — SIGNIFICANT CHANGE UP (ref 9.5–13)
PROTHROM AB SERPL-ACNC: 12 SEC — SIGNIFICANT CHANGE UP (ref 9.5–13)
RBC # BLD: 5.6 M/UL — HIGH (ref 3.8–5.2)
RBC # BLD: 5.6 M/UL — HIGH (ref 3.8–5.2)
RBC # FLD: 14.1 % — SIGNIFICANT CHANGE UP (ref 10.3–14.5)
RBC # FLD: 14.1 % — SIGNIFICANT CHANGE UP (ref 10.3–14.5)
SODIUM SERPL-SCNC: 138 MMOL/L — SIGNIFICANT CHANGE UP (ref 135–145)
SODIUM SERPL-SCNC: 138 MMOL/L — SIGNIFICANT CHANGE UP (ref 135–145)
WBC # BLD: 6.25 K/UL — SIGNIFICANT CHANGE UP (ref 3.8–10.5)
WBC # BLD: 6.25 K/UL — SIGNIFICANT CHANGE UP (ref 3.8–10.5)
WBC # FLD AUTO: 6.25 K/UL — SIGNIFICANT CHANGE UP (ref 3.8–10.5)
WBC # FLD AUTO: 6.25 K/UL — SIGNIFICANT CHANGE UP (ref 3.8–10.5)

## 2023-11-28 PROCEDURE — 99239 HOSP IP/OBS DSCHRG MGMT >30: CPT

## 2023-11-28 PROCEDURE — 71045 X-RAY EXAM CHEST 1 VIEW: CPT | Mod: 26

## 2023-11-28 RX ORDER — ERGOCALCIFEROL 1.25 MG/1
1 CAPSULE ORAL
Qty: 4 | Refills: 0
Start: 2023-11-28 | End: 2023-12-27

## 2023-11-28 RX ORDER — CYCLOBENZAPRINE HYDROCHLORIDE 10 MG/1
1 TABLET, FILM COATED ORAL
Qty: 90 | Refills: 0
Start: 2023-11-28 | End: 2023-12-27

## 2023-11-28 RX ORDER — TOPIRAMATE 25 MG
1 TABLET ORAL
Qty: 30 | Refills: 0
Start: 2023-11-28 | End: 2023-12-27

## 2023-11-28 RX ORDER — ERGOCALCIFEROL 1.25 MG/1
50000 CAPSULE ORAL
Refills: 0 | Status: DISCONTINUED | OUTPATIENT
Start: 2023-11-28 | End: 2023-11-28

## 2023-11-28 RX ORDER — TOPIRAMATE 25 MG
2 TABLET ORAL
Refills: 0 | DISCHARGE

## 2023-11-28 RX ORDER — GABAPENTIN 400 MG/1
100 CAPSULE ORAL THREE TIMES A DAY
Refills: 0 | Status: DISCONTINUED | OUTPATIENT
Start: 2023-11-28 | End: 2023-11-28

## 2023-11-28 RX ORDER — GABAPENTIN 400 MG/1
1 CAPSULE ORAL
Qty: 90 | Refills: 0
Start: 2023-11-28 | End: 2023-12-27

## 2023-11-28 RX ORDER — CYCLOBENZAPRINE HYDROCHLORIDE 10 MG/1
10 TABLET, FILM COATED ORAL THREE TIMES A DAY
Refills: 0 | Status: DISCONTINUED | OUTPATIENT
Start: 2023-11-28 | End: 2023-11-28

## 2023-11-28 RX ORDER — CYCLOBENZAPRINE HYDROCHLORIDE 10 MG/1
1 TABLET, FILM COATED ORAL
Refills: 0 | DISCHARGE

## 2023-11-28 RX ORDER — CHOLECALCIFEROL (VITAMIN D3) 125 MCG
50000 CAPSULE ORAL
Qty: 30 | Refills: 0
Start: 2023-11-28 | End: 2023-12-27

## 2023-11-28 RX ORDER — CHOLECALCIFEROL (VITAMIN D3) 125 MCG
50000 CAPSULE ORAL
Refills: 0 | Status: DISCONTINUED | OUTPATIENT
Start: 2023-11-28 | End: 2023-11-28

## 2023-11-28 RX ADMIN — Medication 1 DROP(S): at 12:19

## 2023-11-28 RX ADMIN — Medication 1 DROP(S): at 05:47

## 2023-11-28 RX ADMIN — ESCITALOPRAM OXALATE 20 MILLIGRAM(S): 10 TABLET, FILM COATED ORAL at 12:18

## 2023-11-28 RX ADMIN — GABAPENTIN 100 MILLIGRAM(S): 400 CAPSULE ORAL at 12:18

## 2023-11-28 RX ADMIN — CYCLOBENZAPRINE HYDROCHLORIDE 10 MILLIGRAM(S): 10 TABLET, FILM COATED ORAL at 12:18

## 2023-11-28 NOTE — DISCHARGE NOTE PROVIDER - ATTENDING DISCHARGE PHYSICAL EXAMINATION:
Vital Signs Last 24 Hrs  T(C): 36.7 (28 Nov 2023 17:12), Max: 36.8 (27 Nov 2023 22:39)  T(F): 98.1 (28 Nov 2023 17:12), Max: 98.3 (27 Nov 2023 22:39)  HR: 75 (28 Nov 2023 17:12) (72 - 88)  BP: 108/70 (28 Nov 2023 17:12) (108/70 - 132/88)  BP(mean): --  RR: 18 (28 Nov 2023 17:12) (18 - 18)  SpO2: 100% (28 Nov 2023 17:12) (100% - 100%)    Parameters below as of 28 Nov 2023 17:12  Patient On (Oxygen Delivery Method): room air        CONSTITUTIONAL: Well-groomed, in no apparent distress  EYES: No conjunctival or scleral injection, non-icteric;   ENMT: No external nasal lesions; MMM  NECK: Trachea midline without palpable neck mass; thyroid not enlarged and non-tender  RESPIRATORY: Breathing comfortably; no dullness to percussion; lungs CTA without wheeze/rhonchi/rales  CARDIOVASCULAR: +S1S2, RRR, no M/G/R; pedal pulses full and symmetric; no lower extremity edema  GASTROINTESTINAL: No palpable masses or tenderness, +BS throughout, no rebound/guarding; no hepatosplenomegaly; no hernia palpated  LYMPHATIC: No cervical LAD or tenderness  SKIN: No rashes or ulcers noted  NEUROLOGIC: CN II-XII intact; sensation intact in LEs b/l to light touch  PSYCHIATRIC: A+O x 3; mood and affect appropriate; appropriate insight and judgment

## 2023-11-28 NOTE — PROGRESS NOTE ADULT - ASSESSMENT
34 yo F PMH craniosynostosis, R-sided neuropathy, obstructive hydrocephalus, vertigo, strabismus, right sided weakness, and gait difficulty presented to the ED secondary to weakness of the left upper and lower extremities.  Patient had CT which did not show any acute changes from prior study.
32 yo F PMH craniosynostosis, R-sided neuropathy, obstructive hydrocephalus, vertigo, strabismus, right sided weakness, and gait difficulty presented to the ED secondary to weakness of the left upper and lower extremities.  Patient had CT which did not show any acute changes from prior study.

## 2023-11-28 NOTE — DISCHARGE NOTE PROVIDER - NSDCCPCAREPLAN_GEN_ALL_CORE_FT
PRINCIPAL DISCHARGE DIAGNOSIS  Diagnosis: Weakness  Assessment and Plan of Treatment: You had a CT of the head which was negative for intracranial bleed. You also had a CTA brain and neck without any acute changes. Neurology evaluated and recommended outpatient MRI brain. PT/OT evaluated recommending outpatient PT/OT. You were given script for rolling walker - patient has cane in possession at time of discharge.      SECONDARY DISCHARGE DIAGNOSES  Diagnosis: Vitamin D deficiency  Assessment and Plan of Treatment: Please take vitamin D 50k units weekly x 6 weeks    Diagnosis: Neuropathy  Assessment and Plan of Treatment: You were initiated on gabapentin. Please continue 100 TID as outpatient and follow-up with PCP and neurologist within 1-2 weeks.

## 2023-11-28 NOTE — PROGRESS NOTE ADULT - PROBLEM SELECTOR PLAN 3
continue home lexapro
-patient denies any urinary symptoms  -repeat UA negative  -will hold off antibiotics

## 2023-11-28 NOTE — PROGRESS NOTE ADULT - PROBLEM SELECTOR PLAN 1
- sudden onset 11/26 with inability to move the LUE and LLE; has baseline weakness on the right  - unclear etiology, and not thought to be due to CVA. Consider in setting of missed doses of medication vs electrolyte abnormality  - appreciate neuro recommendations, f/u MRI brain, per neuro can pursue as outpatient  - appreciate PT eval, OT recommending outpatient OT  - maintain fall precautions  - continue home flexeril PRN muscle spasm

## 2023-11-28 NOTE — DISCHARGE NOTE PROVIDER - NSDCMRMEDTOKEN_GEN_ALL_CORE_FT
cyclobenzaprine 10 mg oral tablet: 1 tab(s) orally 3 times a day  escitalopram 20 mg oral tablet: 1 tab(s) orally once a day  famotidine 20 mg oral tablet: 1 tab(s) orally 2 times a day as needed for  heartburn  gabapentin 100 mg oral capsule: 1 cap(s) orally 3 times a day  ondansetron 4 mg oral tablet, disintegratin tab(s) orally every 8 hours as needed for  nausea  PT: PT and OT 2-3x a week  TheraTears ophthalmic solution: 1 drop(s) in each eye 4 times a day as needed for  dry eyes  topiramate 50 mg oral tablet: 2 tab(s) orally once a day (at bedtime)  Vitamin D2 1.25 mg (50,000 intl units) oral capsule: 1 cap(s) orally once a week once a week on    cyclobenzaprine 10 mg oral tablet: 1 tab(s) orally 3 times a day  escitalopram 20 mg oral tablet: 1 tab(s) orally once a day  famotidine 20 mg oral tablet: 1 tab(s) orally 2 times a day as needed for  heartburn  gabapentin 100 mg oral capsule: 1 cap(s) orally 3 times a day  ondansetron 4 mg oral tablet, disintegratin tab(s) orally every 8 hours as needed for  nausea  PT: PT and OT 2-3x a week  TheraTears ophthalmic solution: 1 drop(s) in each eye 4 times a day as needed for  dry eyes  topiramate 50 mg oral tablet: 2 tab(s) orally once a day (at bedtime)  Vitamin D2 1.25 mg (50,000 intl units) oral capsule: 1 cap(s) orally once a week once a week on   Walker: Dispense rolling walker   cyclobenzaprine 10 mg oral tablet: 1 tab(s) orally 3 times a day  escitalopram 20 mg oral tablet: 1 tab(s) orally once a day  famotidine 20 mg oral tablet: 1 tab(s) orally 2 times a day as needed for  heartburn  gabapentin 100 mg oral capsule: 1 cap(s) orally 3 times a day  ondansetron 4 mg oral tablet, disintegratin tab(s) orally every 8 hours as needed for  nausea  TheraTears ophthalmic solution: 1 drop(s) in each eye 4 times a day as needed for  dry eyes  topiramate 100 mg oral tablet: 1 tab(s) orally once a day (at bedtime)  Vitamin D2 1.25 mg (50,000 intl units) oral capsule: 1 cap(s) orally once a week once a week on

## 2023-11-28 NOTE — DISCHARGE NOTE PROVIDER - HOSPITAL COURSE
34 yo F PMH craniosynostosis, R-sided neuropathy, obstructive hydrocephalus, vertigo, strabismus, right sided weakness, and gait difficulty presented to the ED secondary to weakness of the left upper and lower extremities. Sudden onset 11/26 with inability to move the LUE and LLE; has baseline weakness on the right. Patient had CT which did not show any acute changes from prior study in 9/2023. Neurology evaluated and recommended MRI brain as outpatient. Patient also c/o cervical radiculopathy R sided recent MR C spine reviewed this fall. Patient was initiated on gabapentin for neuropathic symptoms. PT and OT evaluated and recommended outpatient PT/OT. Patient was also found to have vitamin D deficiency level 16.2 and initiated on vitamin D 50k units x 6 weeks.

## 2023-11-28 NOTE — PROGRESS NOTE ADULT - PROBLEM SELECTOR PLAN 5
-DVT ppx: will change HSQ to lovenox  -PT/OT recommending outpatient PT/OT  -SW to arrange RW prior to discharge
-Med-[facility] Pharmacist e-mailed, f/u regarding dose of home topirimate which patient cannot recall  -Ordered home lexapro and flexeril

## 2023-11-28 NOTE — PROGRESS NOTE ADULT - TIME BILLING
50 minutes of total time dedicated to this patient visit today including preparing to see the patient (eg. review of tests), obtaining and/or reviewing separately obtained history, obtaining/reviewing vitals, performing a medically appropriate examination and/or evaluation, counseling and educating the patient/family/caregiver, reviewing previous notes and test results, and procedures, communicating with other health professionals (when not separately reported), and documenting clinical information in the electronic health record.
50 minutes of total time dedicated to this patient visit today including preparing to see the patient (eg. review of tests), obtaining and/or reviewing separately obtained history, obtaining/reviewing vitals, performing a medically appropriate examination and/or evaluation, counseling and educating the patient/family/caregiver, reviewing previous notes and test results, and procedures, communicating with other health professionals (when not separately reported), and documenting clinical information in the electronic health record.

## 2023-11-28 NOTE — DISCHARGE NOTE NURSING/CASE MANAGEMENT/SOCIAL WORK - NSDCVIVACCINE_GEN_ALL_CORE_FT
influenza, injectable, quadrivalent, preservative free; 18-Feb-2015 12:57; April La (RN); [employer]; ; IntraMuscular; Deltoid Left.; 0.5 milliLiter(s); VIS (VIS Published: 19-Aug-2014, VIS Presented: 18-Feb-2015);

## 2023-11-28 NOTE — DISCHARGE NOTE PROVIDER - CARE PROVIDER_API CALL
BASIL SORIA A  [address] [place] [address]  Phone: [phone]  Fax: [phone]  Established Patient  Follow Up Time:

## 2023-11-28 NOTE — PROGRESS NOTE ADULT - SUBJECTIVE AND OBJECTIVE BOX
[facility] Division of Hospital Medicine  Benson Ovalles DO  Available via MS Teams  Pager:[phone]    SUBJECTIVE / OVERNIGHT EVENTS: No acute events overnight. Patient c/o neck pain radiating down arm with chronic associated tingling for which recent MRI C spine was reviewed. Patient denies any HA or visual changes.    ADDITIONAL REVIEW OF SYSTEMS:   Review of Systems:   CONSTITUTIONAL: No fever, weight loss  EYES: No eye pain, visual disturbances, or discharge  ENMT:  No difficulty hearing, tinnitus, vertigo; No sinus or throat pain  RESPIRATORY: No SOB. No cough, wheezing, chills or hemoptysis  CARDIOVASCULAR: No chest pain, palpitations, dizziness, or leg swelling  GASTROINTESTINAL: No abdominal or epigastric pain. No nausea, vomiting, or hematemesis; No diarrhea or constipation. No melena or hematochezia.  GENITOURINARY: No dysuria, frequency, hematuria, or incontinence  NEUROLOGICAL: No headaches, memory loss, loss of strength, +numbness, no tremors  SKIN: No itching, burning, rashes, or lesions   LYMPH NODES: No enlarged glands  ENDOCRINE: No heat or cold intolerance; No hair loss  MUSCULOSKELETAL: + joint pain or swelling; + muscle, back pain  PSYCHIATRIC: No depression, anxiety, mood swings, or difficulty sleeping  HEME/LYMPH: No easy bruising, or bleeding gums      MEDICATIONS  (STANDING):  artificial tears (preservative free) Ophthalmic Solution 1 Drop(s) Both EYES four times a day  cyclobenzaprine 10 milliGRAM(s) Oral three times a day  enoxaparin Injectable 40 milliGRAM(s) SubCutaneous every 24 hours  ergocalciferol 30811 Unit(s) Oral <User Schedule>  escitalopram 20 milliGRAM(s) Oral daily  gabapentin 100 milliGRAM(s) Oral three times a day  influenza   Vaccine 0.5 milliLiter(s) IntraMuscular once  lactated ringers. 1000 milliLiter(s) (125 mL/Hr) IV Continuous <Continuous>  topiramate 100 milliGRAM(s) Oral at bedtime    MEDICATIONS  (PRN):      I&O's Summary      PHYSICAL EXAM:  Vital Signs Last 24 Hrs  T(C): 36.6 (28 Nov 2023 09:50), Max: 36.8 (27 Nov 2023 22:39)  T(F): 97.9 (28 Nov 2023 09:50), Max: 98.3 (27 Nov 2023 22:39)  HR: 79 (28 Nov 2023 09:50) (72 - 100)  BP: 132/88 (28 Nov 2023 09:50) (113/69 - 139/86)  BP(mean): --  RR: 18 (28 Nov 2023 09:50) (16 - 18)  SpO2: 100% (28 Nov 2023 09:50) (99% - 100%)    Parameters below as of 28 Nov 2023 05:44  Patient On (Oxygen Delivery Method): room air      CONSTITUTIONAL: NAD, well-groomed  EYES: PERRLA; conjunctiva and sclera clear  ENMT: Moist oral mucosa, no pharyngeal injection or exudates; normal dentition  NECK: Supple, no palpable masses; no thyromegaly  RESPIRATORY: Normal respiratory effort; lungs are clear to auscultation bilaterally  CARDIOVASCULAR: Regular rate and rhythm, normal S1 and S2, no murmur/rub/gallop; No lower extremity edema; Peripheral pulses are 2+ bilaterally  ABDOMEN: Nontender to palpation, normoactive bowel sounds, no rebound/guarding; No hepatosplenomegaly  MUSCULOSKELETAL:  Normal gait; no clubbing or cyanosis of digits; no joint swelling or tenderness to palpation  PSYCH: A+O to person, place, and time; affect appropriate  NEUROLOGY: CN 2-12 are intact and symmetric; no gross sensory deficits   SKIN: No rashes; no palpable lesions    LABS:                        13.1   6.25  )-----------( 305      ( 28 Nov 2023 06:01 )             41.3     11-28    138  |  102  |  8   ----------------------------<  81  3.7   |  26  |  0.88    Ca    8.9      28 Nov 2023 06:01  Phos  3.2     11-28  Mg     2.30     11-28    TPro  x   /  Alb  x   /  TBili  0.5  /  DBili  x   /  AST  x   /  ALT  x   /  AlkPhos  x   11-28    PT/INR - ( 28 Nov 2023 06:01 )   PT: 12.0 sec;   INR: 1.06 ratio               Urinalysis Basic - ( 28 Nov 2023 06:01 )    Color: x / Appearance: x / SG: x / pH: x  Gluc: 81 mg/dL / Ketone: x  / Bili: x / Urobili: x   Blood: x / Protein: x / Nitrite: x   Leuk Esterase: x / RBC: x / WBC x   Sq Epi: x / Non Sq Epi: x / Bacteria: x        Culture - Blood (collected 26 Nov 2023 23:30)  Source: .Blood Blood-Venous  Preliminary Report (28 Nov 2023 10:02):    No growth at 24 hours    Culture - Blood (collected 26 Nov 2023 23:20)  Source: .Blood Blood-Peripheral  Preliminary Report (28 Nov 2023 10:02):    No growth at 24 hours      SARS-CoV-2: NotDetec (20 Sep 2023 21:37)      RADIOLOGY & ADDITIONAL TESTS:  New Imaging Personally Reviewed Today: Yes  New Electrocardiogram Personally Reviewed Today: N/A  Other Results Reviewed Today: Yes  Prior or Outpatient Records Reviewed Today with Summary: Yes    COORDINATION OF CARE:  Consultant Communication and Details of Discussion (where applicable): Yes    
[facility] Division of Hospital Medicine  Benson Ovalles DO  Available via MS Teams  Pager:[phone]    SUBJECTIVE / OVERNIGHT EVENTS: No acute events overnight. Patient describes b/l UE weakness which has improved since presentation. Denies UE/LE pain. Denies visual changes or HA.    ADDITIONAL REVIEW OF SYSTEMS:  Review of Systems:   CONSTITUTIONAL: No fever, weight loss  EYES: No eye pain, visual disturbances, or discharge  ENMT:  No difficulty hearing, tinnitus, vertigo; No sinus or throat pain  RESPIRATORY: No SOB. No cough, wheezing, chills or hemoptysis  CARDIOVASCULAR: No chest pain, palpitations, dizziness, or leg swelling  GASTROINTESTINAL: No abdominal or epigastric pain. No nausea, vomiting, or hematemesis; No diarrhea or constipation. No melena or hematochezia.  GENITOURINARY: No dysuria, frequency, hematuria, or incontinence  NEUROLOGICAL: No headaches, memory loss, loss of strength, numbness, or tremors  SKIN: No itching, burning, rashes, or lesions   LYMPH NODES: No enlarged glands  ENDOCRINE: No heat or cold intolerance; No hair loss  MUSCULOSKELETAL: No joint pain or swelling; No muscle, back pain  PSYCHIATRIC: No depression, anxiety, mood swings, or difficulty sleeping  HEME/LYMPH: No easy bruising, or bleeding gums    MEDICATIONS  (STANDING):  escitalopram 20 milliGRAM(s) Oral daily  heparin   Injectable 5000 Unit(s) SubCutaneous every 12 hours  influenza   Vaccine 0.5 milliLiter(s) IntraMuscular once  lactated ringers. 1000 milliLiter(s) (125 mL/Hr) IV Continuous <Continuous>    MEDICATIONS  (PRN):  cyclobenzaprine 10 milliGRAM(s) Oral three times a day PRN Muscle Spasm      I&O's Summary    26 Nov 2023 07:01  -  27 Nov 2023 07:00  --------------------------------------------------------  IN: 500 mL / OUT: 450 mL / NET: 50 mL        PHYSICAL EXAM:  Vital Signs Last 24 Hrs  T(C): 37.3 (27 Nov 2023 14:21), Max: 37.3 (27 Nov 2023 14:21)  T(F): 99.1 (27 Nov 2023 14:21), Max: 99.1 (27 Nov 2023 14:21)  HR: 82 (27 Nov 2023 14:21) (79 - 106)  BP: 140/89 (27 Nov 2023 14:21) (128/93 - 148/81)  BP(mean): --  RR: 18 (27 Nov 2023 14:21) (17 - 20)  SpO2: 99% (27 Nov 2023 14:21) (98% - 100%)    Parameters below as of 27 Nov 2023 14:21  Patient On (Oxygen Delivery Method): room air      CONSTITUTIONAL: NAD, well-groomed  EYES: PERRLA; conjunctiva and sclera clear  ENMT: Moist oral mucosa, no pharyngeal injection or exudates; normal dentition  NECK: Supple, no palpable masses; no thyromegaly  RESPIRATORY: Normal respiratory effort; lungs are clear to auscultation bilaterally  CARDIOVASCULAR: Regular rate and rhythm, normal S1 and S2, no murmur/rub/gallop; No lower extremity edema; Peripheral pulses are 2+ bilaterally  ABDOMEN: Nontender to palpation, normoactive bowel sounds, no rebound/guarding; No hepatosplenomegaly  MUSCULOSKELETAL:  Normal gait; no clubbing or cyanosis of digits; no joint swelling or tenderness to palpation  PSYCH: A+O to person, place, and time; affect appropriate  NEUROLOGY: CN 2-12 are intact and symmetric; no gross sensory deficits   SKIN: No rashes; no palpable lesions    LABS:                        12.7   8.67  )-----------( 354      ( 27 Nov 2023 15:05 )             40.6     11-27    139  |  104  |  8   ----------------------------<  92  3.5   |  23  |  0.76    Ca    9.2      27 Nov 2023 15:05  Phos  2.7     11-27  Mg     2.20     11-27    TPro  7.6  /  Alb  4.4  /  TBili  <0.2  /  DBili  x   /  AST  12  /  ALT  21  /  AlkPhos  112  11-26          Urinalysis Basic - ( 27 Nov 2023 15:05 )    Color: x / Appearance: x / SG: x / pH: x  Gluc: 92 mg/dL / Ketone: x  / Bili: x / Urobili: x   Blood: x / Protein: x / Nitrite: x   Leuk Esterase: x / RBC: x / WBC x   Sq Epi: x / Non Sq Epi: x / Bacteria: x        SARS-CoV-2: NotDetec (20 Sep 2023 21:37)      RADIOLOGY & ADDITIONAL TESTS:  New Imaging Personally Reviewed Today: Yes  New Electrocardiogram Personally Reviewed Today: N/A  Other Results Reviewed Today: Yes  Prior or Outpatient Records Reviewed Today with Summary: Yes    COORDINATION OF CARE:  Consultant Communication and Details of Discussion (where applicable): Yes

## 2023-11-28 NOTE — PROGRESS NOTE ADULT - PROBLEM SELECTOR PLAN 2
ordered gabapentin 100mg TID  Patient describes cervical radiculopathy- recent MRI C spine reviewed
- phosphorus 2.2 s/p repletion  - monitor electrolytes, maintain phos>3.0

## 2023-11-28 NOTE — DISCHARGE NOTE NURSING/CASE MANAGEMENT/SOCIAL WORK - PATIENT PORTAL LINK FT
You can access the FollowMyHealth Patient Portal offered by [facility] by registering at the following website: [email or web address]. By joining [email or web address]’s FollowMyHealth portal, you will also be able to view your health information using other applications (apps) compatible with our system.

## 2023-12-02 LAB
CULTURE RESULTS: SIGNIFICANT CHANGE UP
SPECIMEN SOURCE: SIGNIFICANT CHANGE UP

## 2024-02-09 NOTE — DISCHARGE NOTE ADULT - FUNCTIONAL SCREEN CURRENT LEVEL: DRESSING, MLM
02/09/24                            Gabi Whiteside  [address] [place] [address] [place] [address]    To Whom It May Concern:    This is to certify Gabi Whiteside was evaluated with Ana Cox MD on 02/09/24 and can return to regular work on 02/10/2024.     RESTRICTIONS: None             Electronically signed by:  Ana Cox MD  [address] [address]  Dept Phone: [phone]       
(0) independent

## 2024-02-14 NOTE — ED ADULT TRIAGE NOTE - RESPIRATORY RATE (BREATHS/MIN)
HUGH SAMSON is a 44yo now POD#0 s/p abdominal myomectomy, c/b appendectomy by general surgery.     S:    Patient was seen and examined at bedside. Pain is controlled with PRN medication   She has PCA, reports she doesn't need to push it.   She reports drinking ginger ale; however reports N/V.   She reports feeling hot flashes.   She has not ambulated  She denies headache, chest pain, SOB, and leg pain.  - flatus/-BM/+ fole    O:   T(C): 36.5 (02-14-24 @ 12:00), Max: 36.5 (02-14-24 @ 12:00)  HR: 45 (02-14-24 @ 12:30) (41 - 82)  BP: 96/71 (02-14-24 @ 12:30) (92/59 - 113/69)  RR: 15 (02-14-24 @ 12:30) (12 - 20)  SpO2: 100% (02-14-24 @ 12:30) (95% - 100%)    Gen: NAD, AOx3  CV: RRR  Pulm: CTAB  Abdomen: soft, nondistended, appropriately tender, + BS   Incision: clean dry and intact  Extrem: no calf tenderness or edema     Labs:       02-14-24 @ 07:01  -  02-14-24 @ 13:19  --------------------------------------------------------  IN: 150 mL / OUT: 75 mL / NET: 75 mL            A/P:   44yo now POD#0 s/p abdominal myomectomy, c/b appendectomy by general surgery.   Gen: VSS  Neuro: Pain well controlled on current regimen; will continue.  CV: pt noted to have baseline bradycardia. Currently 45.   Pulm: incentive spirometer use encouraged  GI: Bowel sounds normal, tolerating PO diet  : Horton in place with inadequate output. Bolus ordered.   FEN: patient noted to be hypomagnesic and hypophosphatemic; repletion orderd  Heme: Hgb 14.5>12.  DVT ppx: ambulation encouraged, SCDs when in bed, lovenox ordered  Dispo: continue inpatient management 18

## 2024-03-07 NOTE — CONSULT NOTE ADULT - PROBLEM/RECOMMENDATION-1
DISPLAY PLAN FREE TEXT
Detail Level: Zone
Render In Strict Bullet Format?: No
Initiate Treatment: Epi duo-restart

## 2024-03-11 ENCOUNTER — EMERGENCY (EMERGENCY)
Facility: HOSPITAL | Age: 34
LOS: 1 days | Discharge: HOME | End: 2024-03-11
Attending: EMERGENCY MEDICINE | Admitting: EMERGENCY MEDICINE
Payer: MEDICAID

## 2024-03-11 VITALS
SYSTOLIC BLOOD PRESSURE: 137 MMHG | OXYGEN SATURATION: 95 % | RESPIRATION RATE: 17 BRPM | TEMPERATURE: 98 F | DIASTOLIC BLOOD PRESSURE: 84 MMHG | HEART RATE: 76 BPM

## 2024-03-11 DIAGNOSIS — Z98.890 OTHER SPECIFIED POSTPROCEDURAL STATES: Chronic | ICD-10-CM

## 2024-03-11 DIAGNOSIS — Z98.2 PRESENCE OF CEREBROSPINAL FLUID DRAINAGE DEVICE: ICD-10-CM

## 2024-03-11 PROBLEM — Z99.89 DEPENDENCE ON OTHER ENABLING MACHINES AND DEVICES: Chronic | Status: ACTIVE | Noted: 2023-11-27

## 2024-03-11 PROBLEM — Z86.59 PERSONAL HISTORY OF OTHER MENTAL AND BEHAVIORAL DISORDERS: Chronic | Status: ACTIVE | Noted: 2023-11-27

## 2024-03-11 LAB
ALBUMIN SERPL ELPH-MCNC: 4 G/DL — SIGNIFICANT CHANGE UP (ref 3.3–5)
ALP SERPL-CCNC: 90 U/L — SIGNIFICANT CHANGE UP (ref 40–120)
ALT FLD-CCNC: 17 U/L — SIGNIFICANT CHANGE UP (ref 4–33)
ANION GAP SERPL CALC-SCNC: 13 MMOL/L — SIGNIFICANT CHANGE UP (ref 7–14)
APPEARANCE UR: ABNORMAL
AST SERPL-CCNC: 15 U/L — SIGNIFICANT CHANGE UP (ref 4–32)
BACTERIA # UR AUTO: ABNORMAL /HPF
BASOPHILS # BLD AUTO: 0.03 K/UL — SIGNIFICANT CHANGE UP (ref 0–0.2)
BASOPHILS NFR BLD AUTO: 0.4 % — SIGNIFICANT CHANGE UP (ref 0–2)
BILIRUB SERPL-MCNC: 0.3 MG/DL — SIGNIFICANT CHANGE UP (ref 0.2–1.2)
BILIRUB UR-MCNC: NEGATIVE — SIGNIFICANT CHANGE UP
BUN SERPL-MCNC: 10 MG/DL — SIGNIFICANT CHANGE UP (ref 7–23)
CALCIUM SERPL-MCNC: 9.1 MG/DL — SIGNIFICANT CHANGE UP (ref 8.4–10.5)
CAST: 2 /LPF — SIGNIFICANT CHANGE UP (ref 0–4)
CHLORIDE SERPL-SCNC: 108 MMOL/L — HIGH (ref 98–107)
CO2 SERPL-SCNC: 20 MMOL/L — LOW (ref 22–31)
COD CRY URNS QL: PRESENT
COLOR SPEC: SIGNIFICANT CHANGE UP
CREAT SERPL-MCNC: 0.89 MG/DL — SIGNIFICANT CHANGE UP (ref 0.5–1.3)
DIFF PNL FLD: NEGATIVE — SIGNIFICANT CHANGE UP
EGFR: 87 ML/MIN/1.73M2 — SIGNIFICANT CHANGE UP
EOSINOPHIL # BLD AUTO: 0.09 K/UL — SIGNIFICANT CHANGE UP (ref 0–0.5)
EOSINOPHIL NFR BLD AUTO: 1.2 % — SIGNIFICANT CHANGE UP (ref 0–6)
FLUAV AG NPH QL: SIGNIFICANT CHANGE UP
FLUBV AG NPH QL: SIGNIFICANT CHANGE UP
GLUCOSE SERPL-MCNC: 90 MG/DL — SIGNIFICANT CHANGE UP (ref 70–99)
GLUCOSE UR QL: NEGATIVE MG/DL — SIGNIFICANT CHANGE UP
HCT VFR BLD CALC: 39.6 % — SIGNIFICANT CHANGE UP (ref 34.5–45)
HGB BLD-MCNC: 12.1 G/DL — SIGNIFICANT CHANGE UP (ref 11.5–15.5)
IANC: 4.38 K/UL — SIGNIFICANT CHANGE UP (ref 1.8–7.4)
IMM GRANULOCYTES NFR BLD AUTO: 0.3 % — SIGNIFICANT CHANGE UP (ref 0–0.9)
KETONES UR-MCNC: 80 MG/DL
LEUKOCYTE ESTERASE UR-ACNC: ABNORMAL
LYMPHOCYTES # BLD AUTO: 2.33 K/UL — SIGNIFICANT CHANGE UP (ref 1–3.3)
LYMPHOCYTES # BLD AUTO: 31.2 % — SIGNIFICANT CHANGE UP (ref 13–44)
MAGNESIUM SERPL-MCNC: 2.3 MG/DL — SIGNIFICANT CHANGE UP (ref 1.6–2.6)
MCHC RBC-ENTMCNC: 22.8 PG — LOW (ref 27–34)
MCHC RBC-ENTMCNC: 30.6 GM/DL — LOW (ref 32–36)
MCV RBC AUTO: 74.6 FL — LOW (ref 80–100)
MONOCYTES # BLD AUTO: 0.62 K/UL — SIGNIFICANT CHANGE UP (ref 0–0.9)
MONOCYTES NFR BLD AUTO: 8.3 % — SIGNIFICANT CHANGE UP (ref 2–14)
NEUTROPHILS # BLD AUTO: 4.38 K/UL — SIGNIFICANT CHANGE UP (ref 1.8–7.4)
NEUTROPHILS NFR BLD AUTO: 58.6 % — SIGNIFICANT CHANGE UP (ref 43–77)
NITRITE UR-MCNC: NEGATIVE — SIGNIFICANT CHANGE UP
NRBC # BLD: 0 /100 WBCS — SIGNIFICANT CHANGE UP (ref 0–0)
NRBC # FLD: 0 K/UL — SIGNIFICANT CHANGE UP (ref 0–0)
PH UR: 6 — SIGNIFICANT CHANGE UP (ref 5–8)
PLATELET # BLD AUTO: 262 K/UL — SIGNIFICANT CHANGE UP (ref 150–400)
POTASSIUM SERPL-MCNC: 5 MMOL/L — SIGNIFICANT CHANGE UP (ref 3.5–5.3)
POTASSIUM SERPL-SCNC: 5 MMOL/L — SIGNIFICANT CHANGE UP (ref 3.5–5.3)
PROT SERPL-MCNC: 7.3 G/DL — SIGNIFICANT CHANGE UP (ref 6–8.3)
PROT UR-MCNC: 30 MG/DL
RBC # BLD: 5.31 M/UL — HIGH (ref 3.8–5.2)
RBC # FLD: 14.6 % — HIGH (ref 10.3–14.5)
RBC CASTS # UR COMP ASSIST: 16 /HPF — HIGH (ref 0–4)
REVIEW: SIGNIFICANT CHANGE UP
RSV RNA NPH QL NAA+NON-PROBE: SIGNIFICANT CHANGE UP
SARS-COV-2 RNA SPEC QL NAA+PROBE: SIGNIFICANT CHANGE UP
SODIUM SERPL-SCNC: 141 MMOL/L — SIGNIFICANT CHANGE UP (ref 135–145)
SP GR SPEC: 1.05 — HIGH (ref 1–1.03)
SQUAMOUS # UR AUTO: 22 /HPF — HIGH (ref 0–5)
TSH SERPL-MCNC: 0.48 UIU/ML — SIGNIFICANT CHANGE UP (ref 0.27–4.2)
UROBILINOGEN FLD QL: 1 MG/DL — SIGNIFICANT CHANGE UP (ref 0.2–1)
WBC # BLD: 7.47 K/UL — SIGNIFICANT CHANGE UP (ref 3.8–10.5)
WBC # FLD AUTO: 7.47 K/UL — SIGNIFICANT CHANGE UP (ref 3.8–10.5)
WBC UR QL: 9 /HPF — HIGH (ref 0–5)

## 2024-03-11 PROCEDURE — 71045 X-RAY EXAM CHEST 1 VIEW: CPT | Mod: 26

## 2024-03-11 PROCEDURE — 74018 RADEX ABDOMEN 1 VIEW: CPT | Mod: 26

## 2024-03-11 PROCEDURE — 93010 ELECTROCARDIOGRAM REPORT: CPT

## 2024-03-11 PROCEDURE — 70250 X-RAY EXAM OF SKULL: CPT | Mod: 26

## 2024-03-11 PROCEDURE — 99285 EMERGENCY DEPT VISIT HI MDM: CPT

## 2024-03-11 PROCEDURE — 70450 CT HEAD/BRAIN W/O DYE: CPT | Mod: 26,MC

## 2024-03-11 RX ORDER — ONDANSETRON 8 MG/1
4 TABLET, FILM COATED ORAL ONCE
Refills: 0 | Status: COMPLETED | OUTPATIENT
Start: 2024-03-11 | End: 2024-03-11

## 2024-03-11 RX ADMIN — ONDANSETRON 4 MILLIGRAM(S): 8 TABLET, FILM COATED ORAL at 22:03

## 2024-03-11 NOTE — CONSULT NOTE ADULT - ASSESSMENT
ASSESSMENT   33F R handed pmhx hydrocephalus with  Shunt, baseline R hemiparesis and numbness/tingling (etiology unknown, follows outpatient NSGY, neuro, and pain management, walks with cane at baseline), and vertigo at baseline (etiology unknown) presents with new weakness in upper/lower extremities and subjective L facial numbness which she reports occurs daily but has worsened in severity today. Neurology consulted to evaluate weakness.    IMPRESSION   New episode of paresthesias and subjective weakness with clinical exam findings nonfocal and overall primarily 5/5 strength which also may fluctuate and be effort related. Compared to prior presentation late 2023 appears to be primarily unchanged exam. Evaluate for peripheral     RECOMMENDATION  [] f/u CT head report -> if unremarkable can f/u with outpatient neurologist for further workup, would consider outpatient EMG/NCS    Discussed with on call neurology attending.  Note finalized upon attending attestation.  ASSESSMENT   33F R handed pmhx hydrocephalus with  Shunt, baseline R hemiparesis and numbness/tingling (etiology unknown, follows outpatient NSGY, neuro, and pain management, walks with cane at baseline), and vertigo at baseline (etiology unknown) presents with new weakness in upper/lower extremities and subjective L facial numbness which she reports occurs daily but has worsened in severity today. Neurology consulted to evaluate weakness.    IMPRESSION   New episode of paresthesias and subjective weakness with clinical exam findings nonfocal and overall primarily 5/5 strength which also may fluctuate and be effort related. Compared to prior presentation late 2023 appears to be primarily unchanged exam.    RECOMMENDATION  [] f/u CT head report -> if unremarkable can f/u with outpatient neurologist for further workup, would consider outpatient EMG/NCS    Discussed with on call neurology attending.  Note finalized upon attending attestation.

## 2024-03-11 NOTE — ED ADULT NURSE NOTE - CODE STROKE ACTIVE YN
Sulindac 200mg bid and she needs a new appointment
Voltaren 75 mg is not working and wants to know if you can give her something else.  
No

## 2024-03-11 NOTE — CONSULT NOTE ADULT - SUBJECTIVE AND OBJECTIVE BOX
NEUROSURGERY CONSULT    HPI: 32yo female hx hydrocephalus, VPS strata, last revised in 2017 proximal (many revisions prior), Anterior cranial vault expansion 2015, ICP monitors, who presents with upper extremity weakness.  Mother states most of the hx, states since December has been having this intermittent weakness and been admitted here to [facility].  States shunt has not been revised in multiple years and that imaging has not shown any obstruction.  Pt denies fever.  States muffled voice is due to numbness in face that she has during these episodes.  This most recent one was over many weeks per mother. She states she always has headaches and nausea.     RADIOLOGY:   CTH done, vents appear stable    Vital Signs Last 24 Hrs  T(C): 36.8 (11 Mar 2024 14:40), Max: 36.8 (11 Mar 2024 14:40)  T(F): 98.2 (11 Mar 2024 14:40), Max: 98.2 (11 Mar 2024 14:40)  HR: 76 (11 Mar 2024 14:40) (76 - 76)  BP: 137/84 (11 Mar 2024 14:40) (137/84 - 137/84)  BP(mean): --  RR: 17 (11 Mar 2024 14:40) (17 - 17)  SpO2: 95% (11 Mar 2024 14:40) (95% - 95%)    Parameters below as of 11 Mar 2024 14:40  Patient On (Oxygen Delivery Method): room air        LABS:                        12.1   7.47  )-----------( 262      ( 11 Mar 2024 16:43 )             39.6     03-11    141  |  108<H>  |  10  ----------------------------<  90  5.0   |  20<L>  |  0.89    Ca    9.1      11 Mar 2024 16:43  Mg     2.30     03-11    TPro  7.3  /  Alb  4.0  /  TBili  0.3  /  DBili  x   /  AST  15  /  ALT  17  /  AlkPhos  90  03-11          PHYSICAL EXAM:  awake alert following commands Ox3  MAEx4  motor exam is effort limited however appears to be 5/5 throughout with some 4+/5 in the LEs--effort limited  she states she has numbness and tingling in LE  no dysmetria, PERRL, EOMI, face symmetric   speaking through gritted teeth   shaking of extremities during exam

## 2024-03-11 NOTE — CONSULT NOTE ADULT - ASSESSMENT
32yo female hx hydrocephalus, VPS strata, last revised in 2017 proximal (many revisions prior), Anterior cranial vault expansion 2015, ICP monitors, who presents with upper extremity weakness. Mother states most of the hx, states since December has been having this intermittent weakness and sensation changes. Seen with neurology, patient's exam seems to be more consistent with psychosomatic in nature rather than true weakness. Shunt appears to be functioning and is set to 1.0

## 2024-03-11 NOTE — ED PROVIDER NOTE - NSFOLLOWUPINSTRUCTIONS_ED_ALL_ED_FT
You were seen and evaluated in the Emergency Department for your upper extremity weakness.    Clinical examination and history demonstrated no acute evidence of any life-threatening risks to your health as a result of your symptoms. While emergent evaluation does not demonstrate any immediate life-threats, we strongly recommend you follow up with your neurosurgeon for further evaluation of your symptoms.     For the possible swelling in your eyes, you should follow-up with your ophthalmologist or with [facility] Department of Ophthalmology at the address below:  [address] [place] [address]. [address]  [place] [address]  Call [phone] to make an appointment.    Please also follow up with your neurologist, Dr. Del Rosario, for further muscle nerve testing (EMG) (see discharge paperwork for contact information).    Should you develop nausea, vomiting, worsening headaches, inability to walk properly, numbness or tingling in the extremities, dizziness, or changes to your hearing/vision - please immediately return to the Emergency Department for evaluation of your symptoms.     Furthermore we recommend you see your Primary Care Physician for a comprehensive evaluation of your health within the next 48-72 hours.

## 2024-03-11 NOTE — CONSULT NOTE ADULT - ASSESSMENT
Assessment and Recommendations:  32 yo F with PMHx obstructive hydrocephalus s/p  presenting to ED for new onset weakness in bilateral upper/lower extremities, facial numbness, and 1 week of worsening headache. Ophthalmology consulted to rule out papilledema.     # Headaches, papilledema r/o  -Patient presenting with new onset upper/lower extremity weakness, facial numbness, and 1 week of worsening headache.  - VA 20/40 OD, 20/200 OS (PH 20/100). No acute vision changes and patient with reported amblyopia OS.  -Denies TVO, Denies tinnitus, diplopia, and skin products. Does report few recent courses of antibiotics although unsure of names.   -Noted on exam today to have elevation of the nasal margin of the disc OS which may reflect mild papilledema vs pseudopapilledema.   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 [address]  [place] [address]  [phone]

## 2024-03-11 NOTE — CONSULT NOTE ADULT - SUBJECTIVE AND OBJECTIVE BOX
[facility] DEPARTMENT OF OPHTHALMOLOGY - INITIAL ADULT CONSULT  ----------------------------------------------------------------------------------------------------  Gala Elliott MD, PGY-1  -------------------------------------------------------------------------------------------------    HPI:    Interval History: ***    PAST MEDICAL & SURGICAL HISTORY:  Personal History of Hydrocephalus (ICD9 V12.49)      Strabismus      Obstructive hydrocephalus   shunt placed at birth with multiple revisions for malfunction. LAST REVISION 02/2015      Craniosynostosis      Vertigo      Neuropathy  R side of the body      Ambulates with cane      History of depression      Ventriculo-Peritoneal Shunt Status (ICD9 V45.2)   shunt placed at birth with multiple revisions 1990 2002 x2 2003 2004 2007 2009 2011 2015 2016,2017      S/P craniotomy  cranial vault expansion      H/O eye surgery  Repair of lazy eyes 1995        Past Ocular History: ***  Ophthalmic Medications: ***  FAMILY HISTORY:  Family history of malignant neoplasm of endometrium (Grandparent)    Family history of colon cancer (Aunt)    Family history of hypertension (Mother)    Family history of peptic ulcer (Mother)      Social History: ***    MEDICATIONS  (STANDING):    MEDICATIONS  (PRN):    Allergies & Intolerances:   No Known Allergies    Review of Systems:  Constitutional: No fever, chills  Eyes: No blurry vision, flashes, floaters, FBS, erythema, discharge, double vision, OU  Neuro: No tremors  Cardiovascular: No chest pain, palpitations  Respiratory: No SOB, no cough  GI: No nausea, vomiting, abdominal pain  : No dysuria  Skin: no rash  Psych: no depression  Endocrine: no polyuria, polydipsia  Heme/lymph: no swelling    VITALS: T(C): 37.2 (03-11-24 @ 20:21)  T(F): 99 (03-11-24 @ 20:21), Max: 99 (03-11-24 @ 20:21)  HR: 58 (03-11-24 @ 20:21) (58 - 76)  BP: 161/84 (03-11-24 @ 20:21) (137/84 - 161/84)  RR:  (17 - 17)  SpO2:  (95% - 99%)  Wt(kg): --  General: AAO x 3, appropriate mood and affect    Ophthalmology Exam:  Visual acuity (sc): 20/20 OU  Pupils: PERRL OU, no APD  Ttono: 16 OU  Extraocular movements (EOMs): Full OU, no pain, no diplopia  Confrontational Visual Field (CVF): Full OU  Color Plates: 12/12 OU    Pen Light Exam (PLE)  External: Flat OU  Lids/Lashes/Lacrimal Ducts: Flat OU    Sclera/Conjunctiva: W+Q OU  Cornea: Cl OU  Anterior Chamber: D+F OU    Iris: Flat OU  Lens: Cl OU    Fundus Exam: dilated with 1% tropicamide and 2.5% phenylephrine  Approval obtained from primary team for dilation  Patient aware that pupils can remained dilated for at least 4-6 hours  Exam performed with 20D lens    Vitreous: wnl OU  Disc, cup/disc: sharp and pink, 0.4 OU  Macula: wnl OU  Vessels: wnl OU  Periphery: wnl OU    Labs/Imaging:  ***   [facility] DEPARTMENT OF OPHTHALMOLOGY - INITIAL ADULT CONSULT  ----------------------------------------------------------------------------------------------------  Gala Elliott MD, PGY-2  -------------------------------------------------------------------------------------------------    HPI: 33F R handed pmhx hydrocephalus with  Shunt, baseline R hemiparesis and numbness/tingling (etiology unknown, follows outpatient NSGY, neuro, and pain management, walks with cane at baseline), and vertigo at baseline (etiology unknown) presents with new weakness in upper/lower extremities and subjective L facial numbness which she reports occurs daily but has worsened in severity today.    Interval History: Patient reports chronic headaches which have been worsening in severity over the past 1 week. States that headaches are worsened with lying flat, which has been a chronic issue. Headaches have been associated with nausea/vomiting. Denies changes in vision, diplopia, and pulsatile tinnitus. No topical retinoids, but does report recent course of antibiotics (unclear which ones). No rapid weight gain/loss.     PAST MEDICAL & SURGICAL HISTORY:  Personal History of Hydrocephalus (ICD9 V12.49)      Strabismus      Obstructive hydrocephalus   shunt placed at birth with multiple revisions for malfunction. LAST REVISION 02/2015      Craniosynostosis      Vertigo      Neuropathy  R side of the body      Ambulates with cane      History of depression      Ventriculo-Peritoneal Shunt Status (ICD9 V45.2)   shunt placed at birth with multiple revisions 1990 2002 x2 2003 2004 2007 2009 2011 2015 2016,2017      S/P craniotomy  cranial vault expansion      H/O eye surgery  Repair of lazy eyes 1995        Past Ocular History: strabismus s/p surgical repair in 1995, amblyopia OS  Ophthalmic Medications: AT  FAMILY HISTORY: No history of ocular tumors or glaucoma   Family history of malignant neoplasm of endometrium (Grandparent)    Family history of colon cancer (Aunt)    Family history of hypertension (Mother)    Family history of peptic ulcer (Mother)        Allergies & Intolerances:   No Known Allergies    Review of Systems:  Constitutional: No fever, chills  Eyes: as per above  Neuro: No tremors  Cardiovascular: No chest pain, palpitations  Respiratory: No SOB, no cough  GI: No nausea, vomiting, abdominal pain  : No dysuria  Skin: no rash  Psych: no depression  Endocrine: no polyuria, polydipsia  Heme/lymph: no swelling    VITALS: T(C): 37.2 (03-11-24 @ 20:21)  T(F): 99 (03-11-24 @ 20:21), Max: 99 (03-11-24 @ 20:21)  HR: 58 (03-11-24 @ 20:21) (58 - 76)  BP: 161/84 (03-11-24 @ 20:21) (137/84 - 161/84)  RR:  (17 - 17)  SpO2:  (95% - 99%)  Wt(kg): --  General: AAO x 3, appropriate mood and affect    Ophthalmology Exam:  Visual acuity (sc): 20/40 OD (PHNI), 20/200 OS (PH 20/100)  Pupils: PERRL OU, no APD  Ttono: 14 OD, 17 OS  Extraocular movements (EOMs): Hypotropia OS on primary gaze. Over-elevation OD on adduction. +1 abduction deficit OS with adduction on infraduction and infraduction on abduction.   Confrontational Visual Field (CVF): Full OD, Full OS  Color Plates: 12/12 OD, 11/12 OS    Pen Light Exam (PLE)  External: Flat OU  Lids/Lashes/Lacrimal Ducts: Flat OU    Sclera/Conjunctiva: W+Q OU  Cornea: Cl OU  Anterior Chamber: D+F OU    Iris: Flat OU  Lens: Cl OU    Fundus Exam: dilated with 1% tropicamide and 2.5% phenylephrine  Approval obtained from primary team for dilation  Patient aware that pupils can remained dilated for at least 4-6 hours  Exam performed with 20D lens  Consult placed by Terrell Roblero at 9:42 PM with consent obtained to dilate by neurosurg.    Vitreous: wnl OU  Disc, cup/disc: CDR 0.2 OU with suspect double ring sign OU and trace temporal pallor OU. Nasal elevation appreciated OS with no hemorrhages or CWS.   Macula: wnl OU  Vessels: wnl OU  Periphery: wnl OU    Labs/Imaging:  < from: CT Head No Cont (03.11.24 @ 19:05) >  IMPRESSION:    No acute intracranial hemorrhage or mass effect. No hydrocephalus. Stable   chronic changes, as described above.    < end of copied text >

## 2024-03-11 NOTE — ED ADULT NURSE NOTE - NSFALLHARMRISKINTERV_ED_ALL_ED

## 2024-03-11 NOTE — ED ADULT NURSE REASSESSMENT NOTE - NS ED NURSE REASSESS COMMENT FT1
Pt currently lying in bed, shows no signs of acute distress. VSS. Respirations even and unlabored. Reports nausea at this time. Zofran to be given IV. Offers no further complaints at this time. No changes in neuro status noted. Pending neuro surgery and opthalmology consults.

## 2024-03-11 NOTE — CONSULT NOTE ADULT - SUBJECTIVE AND OBJECTIVE BOX
Neurology - Consult Note    Spectra: [phone] ([facility]), [phone] ([facility])    HPI: 33F R handed pmhx hydrocephalus with  Shunt, baseline R hemiparesis and numbness/tingling (etiology unknown, follows outpatient NSGY, neuro, and pain management, walks with cane at baseline), and vertigo at baseline (etiology unknown) presents with new weakness in upper/lower extremities and subjective L facial numbness which she reports occurs daily but has worsened in severity today.    Neurology consulted to evaluate weakness.    Patient has been having intermittent weakness since december, patient's mother advocates that she has a high suspicion it is secondary to the shunt which has not been look at in multiple years. Also states that patient has been having difficulties with speech, reports weakness has worsened since she presented to the ED, states she came in able to walk but since then has not been able to do so. Patient currently advocates she has diffuse 'pins and needles' throuhgout her entire body + inability to move her body associated with whole body pain, states 'feels like there is lidocaine' in my mouth. Also advocates chest discomfort.     Has previously been seen by neuro team 11/2023 - with onset of L-sided symptoms + headache, thought at this time could be functional in origin.  Also advocating 3 weeks during of nausea/vomiting intermittently.     Last saw outpt neurologist Dr ricks two weeks prior and underwent several tests including EEG, tests for vertigo. States got an outpatient EMG several years ago and found carpal tunnel but was otherwise unremarkable.  Recently d/jayjay her flexeril, gabapentin, topiramate last few weeks as they were not helping her symptoms. Denies bowel/bladder issues. Denies substance use. Denies recent illnesses injuries.    At [facility] ambulates with cane outside house for gait issues, at home does not walk with assistive device.    Vitals /84, RR 17, temp 978.2 HR 76 O2 95  Labs: CBC BMP unremarkable  Imaging: xray shunt series unremarkable    Review of Systems:    NEUROLOGICAL: +As stated in HPI above  All other review of systems is negative unless indicated above.    Allergies:  No Known Allergies      PMHx/PSHx/Family Hx: As above, otherwise see below   Personal History of Hydrocephalus (ICD9 V12.49)    Strabismus    Obstructive hydrocephalus    Meniere disease    Craniosynostosis    Vertigo    Neuropathy    Ambulates with cane    History of depression        Social Hx:  as above    Vitals:  T(C): 36.8 (03-11-24 @ 14:40), Max: 36.8 (03-11-24 @ 14:40)  HR: 76 (03-11-24 @ 14:40) (76 - 76)  BP: 137/84 (03-11-24 @ 14:40) (137/84 - 137/84)  RR: 17 (03-11-24 @ 14:40) (17 - 17)  SpO2: 95% (03-11-24 @ 14:40) (95% - 95%)    Physical Examination:   General - NAD  Neurologic Exam: *may also be effort limited  Mental status - Awake, Alert, Oriented to person, place, and time. Speech hesitant with patient not fully opening mouth initially but becomes fluent when continuing conversation with examiner, repetition and naming intact. Follows simple commands.   Cranial nerves - PERRLA, VFF, EOMI, V1-V3 symmetric bilaterally, face symmetric, hearing intact b/l, palate with symmetric elevation, sternocleidomastoid 5/5 strength b/l, tongue midline on protrusion with full lateral movement  Motor - Normal bulk and tone throughout. No pronator drift.  Strength testing            Deltoid      Biceps      Triceps     Wrist Extension    Wrist Flexion         R            5                 5               5                     5                     5                    4+  L             5                 5               5                     5                    5                    4+              Hip Flexion                 Hip Extension    Knee Flexion    Knee Extension    Dorsiflexion    Plantar Flexion  R              5                                                       5                 5                         5                            5                          5  L              5*give way strength initially                    5                5                         5                            5                         5    Sensation - Light touch, pinprick intact throughout. When light touch or pinprick applied, pt endorses increase in tingling sensation.  States diffiusely symmetric throughout with addition of tingling sensation  DTR's -             Biceps      Triceps     Brachioradialis      Patellar    Ankle    Toes/plantar response  R            3+             3+                 3+                3+          3+                Mute  L            3+             3+                 3+                 3+           3+               Mute  +pectoralis b/l  +suprapatellar b/l  Coordination - No dysmetria on FTN b/l  Gait and station - Pt deferred     Labs:                        12.1   7.47  )-----------( 262      ( 11 Mar 2024 16:43 )             39.6     03-11    141  |  108<H>  |  10  ----------------------------<  90  5.0   |  20<L>  |  0.89    Ca    9.1      11 Mar 2024 16:43  Mg     2.30     03-11    TPro  7.3  /  Alb  4.0  /  TBili  0.3  /  DBili  x   /  AST  15  /  ALT  17  /  AlkPhos  90  03-11    CAPILLARY BLOOD GLUCOSE        LIVER FUNCTIONS - ( 11 Mar 2024 16:43 )  Alb: 4.0 g/dL / Pro: 7.3 g/dL / ALK PHOS: 90 U/L / ALT: 17 U/L / AST: 15 U/L / GGT: x           Radiology:  CTH IMPRESSION:  No change compared to the 09/20/2023.  No hydrocephalus or fluid collection. No acute intracranial hemorrhage.    CTA BRAIN AND NECK: Unremarkable. No arterial steno-occlusive disease.  CT PERFUSION: No perfusion deficit per parameters above. Mild Tmax   (between 4-6 seconds) corresponds to posterior cerebral sites of known   congential anomaly.    MR brain w/o contrast 9/2023  Partial agenesis of the corpus callosum is again identified.  Small interhemispheric cyst is again seen.  The lateral ventricles appear slitlike without hydrocephalus identified  Right parietal cynthia hole and shunt catheter is again seen and unchanged   in position.  Encephalomalacia and gliosis involving the right posterior   temporal/occipital region is again seen and unchanged  Small area of gliosis involving the left frontal subcortical region is   identified which could be compatible with gliosis secondary to prior   shunt catheter. Please correlate clinically.  Evaluation of the brain parenchyma appears unchanged.  Small polyp versus retention seen involving posterior right ethmoid sinus  Both mastoid and middle ear regions appear clear.  IMPRESSION: Overall no significant change when allowing for differences   in technique.    MR c-spine 11/2023  mild degenerative changes as described above

## 2024-03-11 NOTE — ED ADULT TRIAGE NOTE - CHIEF COMPLAINT QUOTE
patient c/o chest pain, nausea, dry heaving x2 weeks, and facial numbness x years, worse today- states she has been having "good and bad" days for years where it flares sometimes and then subsides. past medical history of right sided neuropathy,  shunt, strabismus

## 2024-03-11 NOTE — CONSULT NOTE ADULT - PROBLEM SELECTOR RECOMMENDATION 9
- ophtho to r/o pap   - if CT read as officially stable can dc from [facility] perspective    [id number]    Case discussed with attending neurosurgeon Dr. Jerez

## 2024-03-11 NOTE — ED PROVIDER NOTE - PATIENT PORTAL LINK FT
You can access the FollowMyHealth Patient Portal offered by [facility] by registering at the following website: [email or web address]. By joining [employer]’s FollowMyHealth portal, you will also be able to view your health information using other applications (apps) compatible with our system.

## 2024-03-11 NOTE — ED PROVIDER NOTE - PROGRESS NOTE DETAILS
Neurosurgery consulted, no acute interventions indicated, ophthalmology consulted to eval for papilledema per neurosurgery recs, per nsgy if no papilledema pt cleared for DCTH. Neurology recommending outpt follow up for EMG with pt's outpt neurologist. - Terrell Roblero, PGY-3 Ophtho w/exam suspicious for papilledema, but can be pseudopapilledema vs normal variant; neurosurgery updated and cleared pt for DCTH, patient and mom at bedside updated regarding findings and plan for DC. Results, return precautions, follow up instructions discussed and provided in DC paperwork. - Terrell Roblero, PGY-3

## 2024-03-11 NOTE — ED ADULT NURSE NOTE - OBJECTIVE STATEMENT
pt received to room #11. has  shunt. states since december she has had waxing and waning numbness with partial paralysis and pain to the  shunt. pt noted to have a resting nystagmus.  shunt tender to palpation. pt currently unable to lift arms, and is talking out of one side of her mouth. MD rose called to bedside for eval. mother states  shunt has last been revised in 2017, after having a normal MRI, but abnormal LP. rr spontaneous, even and unlabored. c/o of a numbness and pain across the chest, but denies cardiac pain. pt lives with mother, is normally ambulatory with cane. pt aox4. pending CT scan and blood work.

## 2024-03-11 NOTE — ED PROVIDER NOTE - ATTENDING CONTRIBUTION TO CARE
34 yo F PMH craniosynostosis, R-sided neuropathy, obstructive hydrocephalus, vertigo, strabismus, right sided weakness, and gait difficulty presented to the ED for upper extremity weakness.  Mother states most of the hx, states since December has been having this intermittent weakness and been admitted here to [facility].  States shunt has not been revised in multiple years and that imaging has not shown any obstruction.  Pt denies fever.  States muffled voice is due to numbness in face that she has during these episodes.  This most recent one was over many weeks per mother    PE:  well appearing female, in no resp distress, CTAB/L; s1 s2 no m/r/g abd soft/NT/ND Neuro: 3/5 strength UE and 4/5 strength b/l lower extremities; left side of face states subjective numbness, speech at times coherent and at other times not    Imp: chronic hx per pt and mother of this, will proceed with CT head and shunt series, consult neurosurgery and neurology (last visit MRI was recommended which showed no acute pathology)

## 2024-03-12 VITALS
DIASTOLIC BLOOD PRESSURE: 95 MMHG | RESPIRATION RATE: 15 BRPM | OXYGEN SATURATION: 96 % | SYSTOLIC BLOOD PRESSURE: 131 MMHG | TEMPERATURE: 99 F | HEART RATE: 76 BPM

## 2024-03-12 RX ORDER — METOCLOPRAMIDE HCL 10 MG
1 TABLET ORAL
Qty: 20 | Refills: 0
Start: 2024-03-12 | End: 2024-03-16

## 2024-03-12 NOTE — PATIENT PROFILE ADULT - VISION (WITH CORRECTIVE LENSES IF THE PATIENT USUALLY WEARS THEM):
Pt. comes into clinic today at the request of Dr. Laxmi Pradhan.     This service provided today was under the supervising provider of the day Dr. Pradhan, who was available if needed.     Reason for visit: Breast expansion.     Under sterile conditions 200 ml of saline was injected in the left expander.     Totals: Left: 430 mL of saline was injected in left expander. She tolerated the procedure well.  We will see her back next week.      Frederick Carey RN    Normal vision: sees adequately in most situations; can see medication labels, newsprint

## 2024-03-12 NOTE — ED ADULT NURSE REASSESSMENT NOTE - NS ED NURSE REASSESS COMMENT FT1
break coverage rn. received report from JUAN LUIS sparrow. pt A&Ox4, optho at bedside for eval. endorsing edema to eyes at this time. safety maintained. awaiting ortho recommendations. safety maintained

## 2024-03-13 LAB
CULTURE RESULTS: SIGNIFICANT CHANGE UP
SPECIMEN SOURCE: SIGNIFICANT CHANGE UP

## 2024-03-20 ENCOUNTER — APPOINTMENT (OUTPATIENT)
Dept: OPHTHALMOLOGY | Facility: CLINIC | Age: 34
End: 2024-03-20

## 2024-05-06 NOTE — OB RN PATIENT PROFILE - PRO RUBELLA INFANT
Current sexual/physical abuse or other trauma/Chronic pain or other acute medical condition/Substance intoxication or withdrawal immune

## 2024-06-11 ENCOUNTER — APPOINTMENT (OUTPATIENT)
Dept: OBGYN | Facility: CLINIC | Age: 34
End: 2024-06-11

## 2024-07-31 NOTE — ED PROVIDER NOTE - NSICDXPASTSURGICALHX_GEN_ALL_CORE_FT
Addended by: KARLENE ALEXANDER on: 7/30/2024 09:01 PM     Modules accepted: Orders    
PAST SURGICAL HISTORY:  H/O eye surgery Repair of lazy eyes 1995    S/P craniotomy cranial vault expansion    Ventriculo-Peritoneal Shunt Status (ICD9 V45.2)  shunt placed at birth with multiple revisions 1990 2002 x2 2003 2004 2007 2009 2011 2015 2016,2017

## 2024-08-08 NOTE — ED PROVIDER NOTE - CHIEF COMPLAINT
Addended by: VIANEY GILLIS on: 8/8/2024 02:25 PM     Modules accepted: Orders     The patient is a 32y Female complaining of vomiting.

## 2024-11-07 NOTE — PHYSICAL THERAPY INITIAL EVALUATION ADULT - IMPAIRED TRANSFERS: SIT/STAND, REHAB EVAL
Placed call to patient and rescheduled appointment to an appropriate time slot.    Routing to Dr. Ramos to advise in regards to patient instructions.    impaired balance/decreased sensation/impaired sensory feedback/decreased strength

## 2024-12-13 NOTE — H&P ADULT. - NS PRO PASSIVE SMOKE EXP
Toyin calling to ask if we received the LONNIE from them advised did not see anything in media. She is going to fax It again.    No
